# Patient Record
Sex: MALE | Race: BLACK OR AFRICAN AMERICAN | Employment: OTHER | URBAN - METROPOLITAN AREA
[De-identification: names, ages, dates, MRNs, and addresses within clinical notes are randomized per-mention and may not be internally consistent; named-entity substitution may affect disease eponyms.]

---

## 2017-01-19 ENCOUNTER — OFFICE VISIT (OUTPATIENT)
Dept: RHEUMATOLOGY | Age: 72
End: 2017-01-19

## 2017-01-19 VITALS
BODY MASS INDEX: 24.52 KG/M2 | DIASTOLIC BLOOD PRESSURE: 96 MMHG | SYSTOLIC BLOOD PRESSURE: 178 MMHG | OXYGEN SATURATION: 99 % | HEART RATE: 80 BPM | RESPIRATION RATE: 18 BRPM | HEIGHT: 72 IN | TEMPERATURE: 97.9 F | WEIGHT: 181 LBS

## 2017-01-19 DIAGNOSIS — Z79.52 LONG TERM (CURRENT) USE OF SYSTEMIC STEROIDS: ICD-10-CM

## 2017-01-19 DIAGNOSIS — Z79.899 LONG-TERM USE OF HIGH-RISK MEDICATION: ICD-10-CM

## 2017-01-19 DIAGNOSIS — M10.9 POLYARTICULAR GOUT: Primary | ICD-10-CM

## 2017-01-19 DIAGNOSIS — N18.3 CKD (CHRONIC KIDNEY DISEASE), STAGE 3 (MODERATE): ICD-10-CM

## 2017-01-19 DIAGNOSIS — I10 ESSENTIAL HYPERTENSION: ICD-10-CM

## 2017-01-19 RX ORDER — ALLOPURINOL 100 MG/1
TABLET ORAL
Qty: 45 TAB | Refills: 3 | Status: SHIPPED | OUTPATIENT
Start: 2017-01-19 | End: 2017-05-24 | Stop reason: SDUPTHER

## 2017-01-19 RX ORDER — PREDNISONE 5 MG/1
TABLET ORAL
Qty: 60 TAB | Refills: 3 | Status: SHIPPED | OUTPATIENT
Start: 2017-01-19 | End: 2017-08-24 | Stop reason: DRUGHIGH

## 2017-01-19 RX ORDER — COLCHICINE 0.6 MG/1
TABLET ORAL
Qty: 30 TAB | Refills: 3 | Status: SHIPPED | OUTPATIENT
Start: 2017-01-19 | End: 2017-05-24

## 2017-01-19 NOTE — PATIENT INSTRUCTIONS
Increase the Allopurinol to 150 mg total daily, so take 1 and 1/2 tablets daily  of your Allopurinol 100 mg tablets    Stay on the colchicine at one pill (0.6 mg) every other day    Stay on the prednisone at 10 mg daily. This will be a new prescription of prednisone 5 mg tablets, to take 2 of them together with breakfast each morning until next visit. If the wrist and finger are doing much better before your next appointment, then call for instructions on how to lower the prednisone.     Epsom salt soaks and massage the finger for the right pinky

## 2017-01-19 NOTE — PROGRESS NOTES
HISTORY OF PRESENT ILLNESS  Janie Wade is a 66 y.o. male. HPI  He presents for follow up of gout. He is on allopurinol 100 mg daily, and colchicine 0.6 mg every other day, and prednisone 10 mg daily (after a 20 mg taper from last visit and medrol 60 mg IM). He states he is feeling much better, but right 5th finger swelling hasn't gone down and both ankles still hurt. He states his right wrist feels much better, but has some mild discomfort with use. No flare since last visit. Overall he is feeling a whole lot better. He has switched to eating just fish, chicken, and turkey, and has cut out all red-meat since last visit. He also has CKD, sees renal. He reports his kidney function is stable. He takes acetaminophen 1000 mg about 2 doses total in a week for ankle pain. Of note, the pharmacy gave him prednisone 20 mg tablets, he brought in his bottle today, but the dosing is correct and he has been taking 1/2 tablet daily to equal 10 mg. Review of Systems   Constitutional: Negative for fever and weight loss. HENT: Negative for sore throat. Eyes: Negative for blurred vision. Respiratory: Negative for cough and shortness of breath. Cardiovascular: Negative for chest pain, palpitations and leg swelling. Gastrointestinal: Negative for abdominal pain, blood in stool, melena, nausea and vomiting. Musculoskeletal: Negative for falls. Skin: Negative for rash. Neurological: Negative for headaches. Endo/Heme/Allergies: Negative for polydipsia. Does not bruise/bleed easily. All other systems reviewed and are negative. No Known Allergies    Current Outpatient Prescriptions   Medication Sig Dispense Refill    lisinopril (PRINIVIL, ZESTRIL) 20 mg tablet Take  by mouth daily.  allopurinol (ZYLOPRIM) 100 mg tablet Take  by mouth daily.       ferrous sulfate 325 mg (65 mg iron) tablet TAKE 1 TABLET BY MOUTH EVERY DAY  2    folic acid (FOLVITE) 1 mg tablet 1 TABLET ONCE A DAY ORALLY 30 DAY(S) 2    glipiZIDE SR (GLUCOTROL XL) 5 mg CR tablet       predniSONE (DELTASONE) 10 mg tablet 20 mg twice daily for 3 days, then 20 mg once daily for 5 days, then 10 mg daily until follow up 45 Tab 1    traMADol (ULTRAM) 50 mg tablet Take 50 mg by mouth every six (6) hours as needed for Pain.  colchicine 0.6 mg tablet Take 0.6 mg by mouth daily as needed (gout flares).  atorvastatin (LIPITOR) 10 mg tablet TAKE 1 TABLET BY MOUTH ONCE A DAY  2    glyBURIDE (DIABETA) 2.5 mg tablet TAKE 1 TABLET BY MOUTH TWICE A DAY  2    metoprolol succinate (TOPROL-XL) 50 mg XL tablet Take 50 mg by mouth daily.  pantoprazole (PROTONIX) 40 mg tablet Take 40 mg by mouth daily.  oxyCODONE-acetaminophen (PERCOCET) 5-325 mg per tablet Take 1-2 Tabs by mouth every four (4) hours as needed for Pain. Max Daily Amount: 12 Tabs. 20 Tab 0    senna-docusate (SENNA WITH DOCUSATE SODIUM) 8.6-50 mg per tablet Take 1 Tab by mouth two (2) times a day.  tamsulosin (FLOMAX) 0.4 mg capsule Take 0.4 mg by mouth daily. Past medical, surgical, and family hx reviewed. height is 6' (1.829 m) and weight is 181 lb (82.1 kg). His temperature is 97.9 °F (36.6 °C). His blood pressure is 178/96 (abnormal) and his pulse is 80. His respiration is 18 and oxygen saturation is 99%. Physical Exam   Constitutional: He is oriented to person, place, and time. He appears well-developed and well-nourished. HENT:   Mouth/Throat: Oropharynx is clear and moist.   Eyes: Conjunctivae are normal. Pupils are equal, round, and reactive to light. Neck: Neck supple. Cardiovascular: Normal rate, regular rhythm and normal heart sounds. Pulmonary/Chest: Effort normal and breath sounds normal. He has no wheezes. Abdominal: Soft. Bowel sounds are normal. There is no tenderness.    Musculoskeletal: He exhibits no edema.   -synovitis: right wrist; right 5th PIP  -comfortable grossly intact ROM of shoulders and knees   -walking comfortably with cane   Lymphadenopathy:     He has no cervical adenopathy. Neurological: He is alert and oriented to person, place, and time. Skin: Skin is warm and dry. No rash noted. Psychiatric: He has a normal mood and affect. Thought content normal.   Vitals reviewed. No new labs since last visit    ASSESSMENT and PLAN    ICD-10-CM ICD-9-CM    1. Polyarticular gout - crystal proven. Limited attacks for perhaps 5-6 years with marked increase in symptoms since September 2016. He is taking colchicine 0.6 mg every other day (mild diarrhea with daily dosing) and allopurinol 100 mg daily. He is on prednisone 10 mg daily (after brief taper from 20 mg after last visit). Though the highest value on record was 7.3 in September. I suspect other values are likely suppressed due to acute gout symptoms. At prior visit he had marked and diffuse tenosynovitis which is greatly imrpoved today, still persists in right wrist and 5th finger. M10.00 274.00 SED RATE (ESR)    Increase allopurinol to 150 mg daily    Continue prednisone 10 mg daily (note change to 5 mg tablets, take 2 daily)    If doing well in 3 weeks, call for prednisone taper instructions    Continue colchicine 0.6 mg every other day (call if develops diarrhea)      URIC ACID      C REACTIVE PROTEIN, QT      CBC WITH AUTOMATED DIFF      METABOLIC PANEL, COMPREHENSIVE      predniSONE (DELTASONE) 5 mg tablet      allopurinol (ZYLOPRIM) 100 mg tablet      colchicine 0.6 mg tablet     2. Long-term use of high-risk medication Z79.899 V58.69 CBC WITH AUTOMATED DIFF      METABOLIC PANEL, COMPREHENSIVE     3. CKD (chronic kidney disease), stage 3 (moderate) - unclear etiology. He sees renal, but is unsure of his doctor's name   S94.4 544.8 METABOLIC PANEL, COMPREHENSIVE    -monitor with nephrologist  -colchicine as noted above  -titrate allopurinol cautiously   -no NSAIDS     4.  Long term (current) use of systemic steroids Z79.52 V58.65 He plans to schedule DXA ordered at last visit in the next couple months    Continue calcium and D    Do not abruptly stop prednisone     5. Essential hypertension - elevated today, he reports he has taken his medicines, he reports follow up appt with his PCP next week. I10 401.9 Recheck BP at pharmacy    Follow up with PCP next week as scheduled, sooner if higher BP readings on check at pharmacy or to ER if develops any warning signs discussed       Follow-up Disposition:  Return in about 4 weeks (around 2/16/2017). I have reviewed and discussed all findings with Dr. Deanne Conteh, who also examined the patient, and he agrees with the assessment and plan. ADDENDUM: I have seen and examined the patient. I have discussed the relevant findings with Ms. Carr and concur with the assessment and plan. Previous gout flare much improved. Perhaps mild residual synovitis in wrist and fifth finger. Allopurinol to 150 mg daily: reviewed increased risk for flare with dose change. Colchicine 0.6 mg every other day. Prednisone 10 mg daily for now as additional prophylactic given prior marked flares. Work on taper next visit if feasible. Calcium 1200 mg daily through healthy diet. Vitamin D3 3203-5942 units daily. DXA. Labs and close follow up as noted.   Cuong Franklin MD

## 2017-01-19 NOTE — MR AVS SNAPSHOT
Visit Information Date & Time Provider Department Dept. Phone Encounter #  
 1/19/2017  1:00 PM Delores Baker MD [facility] [id number] Follow-up Instructions Return in about 4 weeks (around 2/16/2017). Upcoming Health Maintenance Date Due  
 LIPID PANEL Q1 1945 FOOT EXAM Q1 5/27/1955 MICROALBUMIN Q1 5/27/1954 EYE EXAM RETINAL OR DILATED Q1 5/27/1954 FOBT Q 1 YEAR AGE 50-75 5/27/1994 ZOSTER VACCINE AGE 60> 5/27/2004 GLAUCOMA SCREENING Q2Y 5/27/2009 Pneumococcal 65+ High/Highest Risk (1 of 2 - PCV13) 5/27/2009 MEDICARE YEARLY EXAM 5/27/2009 DTaP/Tdap/Td series (1 - Tdap) 8/27/2011 HEMOGLOBIN A1C Q6M 3/17/2017 Allergies as of 1/19/2017  Review Complete On: 1/19/2017 By: Haresh Doty PA-C No Known Allergies Current Immunizations  Reviewed on 12/13/2016 Name Date  
 TD Vaccine 8/26/2011  8:04 PM  
  
 Not reviewed this visit You Were Diagnosed With   
  
 Codes Comments Polyarticular gout    -  Primary ICD-10-CM: M10.00 ICD-9-CM: 274.00 Long-term use of high-risk medication     ICD-10-CM: Z79.899 ICD-9-CM: V58.69 CKD (chronic kidney disease), stage 3 (moderate)     ICD-10-CM: N18.3 ICD-9-CM: 086. 3 Vitals BP Pulse Temp Resp Height(growth percentile) Weight(growth percentile) (!) 185/93 (BP 1 Location: Right arm, BP Patient Position: Sitting) 80 97.9 °F (36.6 °C) 18 6' (1.829 m) 181 lb (82.1 kg) SpO2 BMI Smoking Status 99% 24.55 kg/m2 Former Smoker BMI and BSA Data Body Mass Index Body Surface Area 24.55 kg/m 2 2.04 m 2 Preferred Pharmacy Pharmacy Name Phone [facility] [place], [address] [place] [address] AT [address] [place] [address] Drive [phone] Your Updated Medication List  
  
   
This list is accurate as of: 1/19/17  2:01 PM.  Always use your most recent med list.  
  
  
  
  
 allopurinol 100 mg tablet Commonly known as:  Dorota Minor Take 1 and 1/2 tabs po daily. THIS IS A NEW AND HIGHER DOSE.  
  
 atorvastatin 10 mg tablet Commonly known as:  LIPITOR  
TAKE 1 TABLET BY MOUTH ONCE A DAY  
  
 colchicine 0.6 mg tablet Take 1 tab po daily or as directed  
  
 ferrous sulfate 325 mg (65 mg iron) tablet TAKE 1 TABLET BY MOUTH EVERY DAY  
  
 folic acid 1 mg tablet Commonly known as:  FOLVITE  
1 TABLET ONCE A DAY ORALLY 30 DAY(S)  
  
 glipiZIDE SR 5 mg CR tablet Commonly known as:  GLUCOTROL XL  
  
 glyBURIDE 2.5 mg tablet Commonly known as:  Lorenzo dough TAKE 1 TABLET BY MOUTH TWICE A DAY  
  
 lisinopril 20 mg tablet Commonly known as:  Yuri Hu Take  by mouth daily. metoprolol succinate 50 mg XL tablet Commonly known as:  TOPROL-XL Take 50 mg by mouth daily. oxyCODONE-acetaminophen 5-325 mg per tablet Commonly known as:  PERCOCET Take 1-2 Tabs by mouth every four (4) hours as needed for Pain. Max Daily Amount: 12 Tabs. pantoprazole 40 mg tablet Commonly known as:  PROTONIX Take 40 mg by mouth daily. predniSONE 5 mg tablet Commonly known as:  Reino Marks Take 2 tabs po qam with breakfast  
  
 SENNA WITH DOCUSATE SODIUM 8.6-50 mg per tablet Generic drug:  senna-docusate Take 1 Tab by mouth two (2) times a day. tamsulosin 0.4 mg capsule Commonly known as:  FLOMAX Take 0.4 mg by mouth daily. traMADol 50 mg tablet Commonly known as:  ULTRAM  
Take 50 mg by mouth every six (6) hours as needed for Pain. Prescriptions Sent to Pharmacy Refills  
 predniSONE (DELTASONE) 5 mg tablet 3 Sig: Take 2 tabs po qam with breakfast  
 Class: Normal  
 Pharmacy: [facility] [address] AT [address] Ph #: [phone]  
 allopurinol (ZYLOPRIM) 100 mg tablet 3 Sig: Take 1 and 1/2 tabs po daily. THIS IS A NEW AND HIGHER DOSE. Class: Normal  
 Pharmacy: [email or web address] [address] AT [address] [place] [address] Drive Ph #: [phone]  
 colchicine 0.6 mg tablet 3 Sig: Take 1 tab po daily or as directed Class: Normal  
 Pharmacy: [facility] [address] AT [address] [place] [address] P Ph #: [phone] We Performed the Following C REACTIVE PROTEIN, QT [02367 CPT(R)] CBC WITH AUTOMATED DIFF [98770 CPT(R)] METABOLIC PANEL, COMPREHENSIVE [75210 CPT(R)] SED RATE (ESR) [id number] CPT(R)] URIC ACID [id number] CPT(R)] Follow-up Instructions Return in about 4 weeks (around 2/16/2017). Patient Instructions Increase the Allopurinol to 150 mg total daily, so take 1 and 1/2 tablets daily  of your Allopurinol 100 mg tablets Stay on the colchicine at one pill (0.6 mg) every other day Stay on the prednisone at 10 mg daily. This will be a new prescription of prednisone 5 mg tablets, to take 2 of them together with breakfast each morning until next visit. If the wrist and finger are doing much better before your next appointment, then call for instructions on how to lower the prednisone. Epsom salt soaks and massage the finger for the right pinky Introducing [facility] [place] [facility]! [facility] introduces e[employer] patient portal. Now you can access parts of your medical record, email your doctor's office, and request medication refills online. 1. In your internet browser, go to [email or web address] 2. Click on the First Time User? Click Here link in the Sign In box. You will see the New Member Sign Up page. 3. Enter your eoSemi Access Code exactly as it appears below. You will not need to use this code after youve completed the sign-up process. If you do not sign up before the expiration date, you must request a new code. · eo[employer]i Access Code: [email or web address] Expires: 3/13/2017  2:08 PM 
 
 4. Enter the last four digits of your Social Security Number (xxxx) and Date of Birth (mm/dd/yyyy) as indicated and click Submit. You will be taken to the next sign-up page. 5. Create a [employer] ID. This will be your [employer] login ID and cannot be changed, so think of one that is secure and easy to remember. 6. Create a [employer] password. You can change your password at any time. 7. Enter your Password Reset Question and Answer. This can be used at a later time if you forget your password. 8. Enter your e-mail address. You will receive e-mail notification when new information is available in [address] 9. Click Sign Up. You can now view and download portions of your medical record. 10. Click the Download Summary menu link to download a portable copy of your medical information. If you have questions, please visit the Frequently Asked Questions section of the [employer] website. Remember, [employer] is NOT to be used for urgent needs. For medical emergencies, dial 911. Now available from your iPhone and Android! Please provide this summary of care documentation to your next provider. Your primary care clinician is listed as Morris Medina. If you have any questions after today's visit, please call [phone].

## 2017-01-20 ENCOUNTER — TELEPHONE (OUTPATIENT)
Dept: RHEUMATOLOGY | Age: 72
End: 2017-01-20

## 2017-01-20 LAB
ALBUMIN SERPL-MCNC: 4.2 G/DL (ref 3.5–4.8)
ALBUMIN/GLOB SERPL: 1.6 {RATIO} (ref 1.1–2.5)
ALP SERPL-CCNC: 63 IU/L (ref 39–117)
ALT SERPL-CCNC: 17 IU/L (ref 0–44)
AST SERPL-CCNC: 18 IU/L (ref 0–40)
BASOPHILS # BLD AUTO: 0 X10E3/UL (ref 0–0.2)
BASOPHILS NFR BLD AUTO: 0 %
BILIRUB SERPL-MCNC: 0.3 MG/DL (ref 0–1.2)
BUN SERPL-MCNC: 15 MG/DL (ref 8–27)
BUN/CREAT SERPL: 16 (ref 10–22)
CALCIUM SERPL-MCNC: 8.9 MG/DL (ref 8.6–10.2)
CHLORIDE SERPL-SCNC: 105 MMOL/L (ref 96–106)
CO2 SERPL-SCNC: 22 MMOL/L (ref 18–29)
CREAT SERPL-MCNC: 0.92 MG/DL (ref 0.76–1.27)
CRP SERPL-MCNC: 2.6 MG/L (ref 0–4.9)
EOSINOPHIL # BLD AUTO: 0 X10E3/UL (ref 0–0.4)
EOSINOPHIL NFR BLD AUTO: 1 %
ERYTHROCYTE [DISTWIDTH] IN BLOOD BY AUTOMATED COUNT: 19 % (ref 12.3–15.4)
ERYTHROCYTE [SEDIMENTATION RATE] IN BLOOD BY WESTERGREN METHOD: 26 MM/HR (ref 0–30)
GLOBULIN SER CALC-MCNC: 2.7 G/DL (ref 1.5–4.5)
GLUCOSE SERPL-MCNC: 65 MG/DL (ref 65–99)
HCT VFR BLD AUTO: 39.9 % (ref 37.5–51)
HGB BLD-MCNC: 12.5 G/DL (ref 12.6–17.7)
IMM GRANULOCYTES # BLD: 0 X10E3/UL (ref 0–0.1)
IMM GRANULOCYTES NFR BLD: 0 %
LYMPHOCYTES # BLD AUTO: 1.4 X10E3/UL (ref 0.7–3.1)
LYMPHOCYTES NFR BLD AUTO: 19 %
MCH RBC QN AUTO: 29.1 PG (ref 26.6–33)
MCHC RBC AUTO-ENTMCNC: 31.3 G/DL (ref 31.5–35.7)
MCV RBC AUTO: 93 FL (ref 79–97)
MONOCYTES # BLD AUTO: 0.3 X10E3/UL (ref 0.1–0.9)
MONOCYTES NFR BLD AUTO: 4 %
NEUTROPHILS # BLD AUTO: 5.7 X10E3/UL (ref 1.4–7)
NEUTROPHILS NFR BLD AUTO: 76 %
PLATELET # BLD AUTO: 192 X10E3/UL (ref 150–379)
POTASSIUM SERPL-SCNC: 4.7 MMOL/L (ref 3.5–5.2)
PROT SERPL-MCNC: 6.9 G/DL (ref 6–8.5)
RBC # BLD AUTO: 4.29 X10E6/UL (ref 4.14–5.8)
SODIUM SERPL-SCNC: 147 MMOL/L (ref 134–144)
URATE SERPL-MCNC: 4.8 MG/DL (ref 3.7–8.6)
WBC # BLD AUTO: 7.4 X10E3/UL (ref 3.4–10.8)

## 2017-01-20 NOTE — PROGRESS NOTES
Called and spoke with Patient. Informed patient of results/instructions. Patient voiced understanding and agreed to follow Dr. Sarbjit Taylor instructions.

## 2017-01-20 NOTE — PROGRESS NOTES
Uric acid seems to be in a reasonable range (though still may be affected by recent flare). Slightly increased sodium: please drink plenty of water throughout the day.  Allopurinol, prednisone, and colchicine as directed

## 2017-01-25 ENCOUNTER — TELEPHONE (OUTPATIENT)
Dept: RHEUMATOLOGY | Age: 72
End: 2017-01-25

## 2017-02-24 ENCOUNTER — OFFICE VISIT (OUTPATIENT)
Dept: RHEUMATOLOGY | Age: 72
End: 2017-02-24

## 2017-02-24 VITALS
HEART RATE: 81 BPM | OXYGEN SATURATION: 97 % | SYSTOLIC BLOOD PRESSURE: 170 MMHG | TEMPERATURE: 98 F | HEIGHT: 72 IN | DIASTOLIC BLOOD PRESSURE: 86 MMHG | BODY MASS INDEX: 25.84 KG/M2 | RESPIRATION RATE: 22 BRPM | WEIGHT: 190.8 LBS

## 2017-02-24 DIAGNOSIS — Z79.899 LONG-TERM USE OF HIGH-RISK MEDICATION: ICD-10-CM

## 2017-02-24 DIAGNOSIS — Z79.52 LONG TERM (CURRENT) USE OF SYSTEMIC STEROIDS: ICD-10-CM

## 2017-02-24 DIAGNOSIS — I10 ESSENTIAL HYPERTENSION: ICD-10-CM

## 2017-02-24 DIAGNOSIS — N18.2 CKD (CHRONIC KIDNEY DISEASE), STAGE 2 (MILD): ICD-10-CM

## 2017-02-24 DIAGNOSIS — M1A.00X1 CHRONIC GOUTY ARTHROPATHY WITH TOPHI: Primary | ICD-10-CM

## 2017-02-24 RX ORDER — GLUCOSAMINE SULFATE 1500 MG
1000 POWDER IN PACKET (EA) ORAL DAILY
COMMUNITY

## 2017-02-24 NOTE — PROGRESS NOTES
HISTORY OF PRESENT ILLNESS  Lin Izaguirre is a 66 y.o. male. HPI Patient presents for follow up of gout. He is doing \"much better,\" though he noted a left elbow flare, beginning about 2 weeks ago. He had some swelling in the left elbow. Pain and swelling have improved,and the flare is milder than previous flares. He currently notes pain in the left ankle as well. He is taking allopurinol 150 mg once daily. He is taking colchicine 0.6 mg every other day and is tolerating this. He continues to take prednisone 10 mg daily. His blood sugars have been in the 110 range. He is taking 1000 units daily of vitamin D. He is getting perhaps 2 servings of calcium rich food daily. Current Outpatient Prescriptions   Medication Sig Dispense Refill    predniSONE (DELTASONE) 5 mg tablet Take 2 tabs po qam with breakfast 60 Tab 3    allopurinol (ZYLOPRIM) 100 mg tablet Take 1 and 1/2 tabs po daily. THIS IS A NEW AND HIGHER DOSE. 45 Tab 3    colchicine 0.6 mg tablet Take 1 tab po daily or as directed 30 Tab 3    lisinopril (PRINIVIL, ZESTRIL) 20 mg tablet Take  by mouth daily.  ferrous sulfate 325 mg (65 mg iron) tablet TAKE 1 TABLET BY MOUTH EVERY DAY  2    folic acid (FOLVITE) 1 mg tablet 1 TABLET ONCE A DAY ORALLY 30 DAY(S)  2    glipiZIDE SR (GLUCOTROL XL) 5 mg CR tablet Take 5 mg by mouth two (2) times a day.  pantoprazole (PROTONIX) 40 mg tablet Take 40 mg by mouth daily.  traMADol (ULTRAM) 50 mg tablet Take 50 mg by mouth every six (6) hours as needed for Pain.  oxyCODONE-acetaminophen (PERCOCET) 5-325 mg per tablet Take 1-2 Tabs by mouth every four (4) hours as needed for Pain. Max Daily Amount: 12 Tabs. 20 Tab 0    atorvastatin (LIPITOR) 10 mg tablet TAKE 1 TABLET BY MOUTH ONCE A DAY  2    metoprolol succinate (TOPROL-XL) 50 mg XL tablet Take 50 mg by mouth daily.  tamsulosin (FLOMAX) 0.4 mg capsule Take 0.4 mg by mouth daily.       senna-docusate (SENNA WITH DOCUSATE SODIUM) 8.6-50 mg per tablet Take 1 Tab by mouth two (2) times a day. Not taking      glyBURIDE (DIABETA) 2.5 mg tablet Not taking. 2     No Known Allergies    Review of Systems   Constitutional: Negative for fever. Cardiovascular: Positive for leg swelling. Gastrointestinal: Negative for abdominal pain and nausea. Skin: Negative for rash. Physical Exam   Vitals reviewed. Constitutional: He is oriented to person, place, and time. He appears well-developed and well-nourished. No distress. HENT:   Mouth/Throat: Oropharynx is clear and moist. No oropharyngeal exudate. No areas exposed jaw bone  Neck: Normal range of motion. Neck supple. Cardiovascular:   No edema   Pulmonary/Chest: Effort normal   Abdominal: Soft. He exhibits no distension. There is no tenderness. No organomegaly   Musculoskeletal:   -knees flexion to 90 degrees  -left elbow extension limited by 15 degrees  -synovitis left elbow, with tenderness left ankle  Lymphadenopathy:   He has no cervical adenopathy. Neurological: He is alert and oriented to person, place, and time. He exhibits normal muscle tone. Skin: Skin is warm and dry.   -few very small right olecranon nodules  Psychiatric: He has a normal mood and affect.  Thought content normal.   Lab Results   Component Value Date/Time    WBC 7.4 01/19/2017 02:09 PM    HGB 12.5 01/19/2017 02:09 PM    HCT 39.9 01/19/2017 02:09 PM    PLATELET 707 15/03/2899 02:09 PM    MCV 93 01/19/2017 02:09 PM     Lab Results   Component Value Date/Time    Sodium 147 01/19/2017 02:09 PM    Potassium 4.7 01/19/2017 02:09 PM    Chloride 105 01/19/2017 02:09 PM    CO2 22 01/19/2017 02:09 PM    Anion gap 9 11/18/2016 11:47 AM    Glucose 65 01/19/2017 02:09 PM    BUN 15 01/19/2017 02:09 PM    Creatinine 0.92 01/19/2017 02:09 PM    BUN/Creatinine ratio 16 01/19/2017 02:09 PM    GFR est AA 96 01/19/2017 02:09 PM    GFR est non-AA 83 01/19/2017 02:09 PM    Calcium 8.9 01/19/2017 02:09 PM    Bilirubin, total 0.3 01/19/2017 02:09 PM    AST (SGOT) 18 01/19/2017 02:09 PM    Alk. phosphatase 63 01/19/2017 02:09 PM    Protein, total 6.9 01/19/2017 02:09 PM    Albumin 4.2 01/19/2017 02:09 PM    Globulin 4.9 11/18/2016 11:47 AM    A-G Ratio 1.6 01/19/2017 02:09 PM    ALT (SGPT) 17 01/19/2017 02:09 PM     Lab Results   Component Value Date/Time    Uric acid 4.8 01/19/2017 02:09 PM     ASSESSMENT and PLAN    ICD-10-CM ICD-9-CM    1. 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 Limited attacks for perhaps 5-6 years with marked increase in symptoms since September 2016. He is taking colchicine 0.6 mg every other day, along with allopurinol 150 mg daily. Prednisone remains at 10 mg daily. Mild recent left elbow flare, no resolving. Overall improving from baseline visit. Last uric acid appears to be at goal (4.8). M1A.00X1 274.03 -allopurinol 150 mg daily (labs in 4 weeks)  -colchicine 0.6 mg once daily if tolerated  -prednisone slow taper over next 2-3 months (stay at lowest effective dose if significant flare)  -gout friendly diet reviewed   2. CKD (chronic kidney disease), stage 2 (mild): improved creatinine last check N18.2 585.2 CMP with next labs   3. Long-term use of high-risk medication Z79.899 V58.69 CBC and CMP 4 weeks   4. Long term (current) use of systemic steroids Z79.52 V58.65 -taper as noted  -calcium 1200 mg daily through healthy diet  -vitamin D3 1000 units daily  -schedule DXA     5.  Essential hypertension: on therapy I10 401.9 Monitor at home and with PCP if remains above goal

## 2017-02-24 NOTE — PATIENT INSTRUCTIONS
Schedule bone density    Colchicine 0.6 mg once daily.  If diarrhea , lower back to every other day ( and call)    Allopurinol 150 mg daily every day    Next week, begin lowering prednisone if tolerated:  1) For 1 week, 10 mg daily, alternating with 7.5 mg daily, then  2) For 1 week 7.5 mg daily (1 1/2 tabs), then  3) For 1 week, 7.5 mg daily, alternating with 5 mg daily, then  4) for 2 weeks, 5 mg daily, then  5) for 1 week, 5 mg daily, alternating with 2.5 mg daily, then  6) for 2 weeks, 2.5 mg (1/2 tab) daily, then  7) For 2 weeks, 2.5 mg every other day, then stop    Labs in 4 weeks if no recent flare

## 2017-03-07 ENCOUNTER — HOSPITAL ENCOUNTER (EMERGENCY)
Age: 72
Discharge: HOME | End: 2017-03-07
Attending: EMERGENCY MEDICINE | Admitting: EMERGENCY MEDICINE
Payer: MEDICARE

## 2017-03-07 ENCOUNTER — APPOINTMENT (OUTPATIENT)
Dept: GENERAL RADIOLOGY | Age: 72
End: 2017-03-07
Attending: EMERGENCY MEDICINE
Payer: MEDICARE

## 2017-03-07 ENCOUNTER — HOSPITAL ENCOUNTER (OUTPATIENT)
Dept: MAMMOGRAPHY | Age: 72
Discharge: HOME | End: 2017-03-07
Attending: INTERNAL MEDICINE
Payer: MEDICARE

## 2017-03-07 VITALS
WEIGHT: 191.5 LBS | SYSTOLIC BLOOD PRESSURE: 194 MMHG | TEMPERATURE: 97.8 F | OXYGEN SATURATION: 99 % | HEART RATE: 89 BPM | HEIGHT: 72 IN | RESPIRATION RATE: 16 BRPM | DIASTOLIC BLOOD PRESSURE: 85 MMHG | BODY MASS INDEX: 25.94 KG/M2

## 2017-03-07 DIAGNOSIS — S63.92XA SPRAIN OF LEFT HAND, INITIAL ENCOUNTER: ICD-10-CM

## 2017-03-07 DIAGNOSIS — Z79.52 LONG TERM (CURRENT) USE OF SYSTEMIC STEROIDS: ICD-10-CM

## 2017-03-07 DIAGNOSIS — S00.432A CONTUSION OF LEFT EAR, INITIAL ENCOUNTER: Primary | ICD-10-CM

## 2017-03-07 DIAGNOSIS — S63.502A SPRAIN OF LEFT WRIST, INITIAL ENCOUNTER: ICD-10-CM

## 2017-03-07 PROCEDURE — 73110 X-RAY EXAM OF WRIST: CPT

## 2017-03-07 PROCEDURE — 77080 DXA BONE DENSITY AXIAL: CPT

## 2017-03-07 PROCEDURE — 99282 EMERGENCY DEPT VISIT SF MDM: CPT

## 2017-03-07 PROCEDURE — 90714 TD VACC NO PRESV 7 YRS+ IM: CPT | Performed by: EMERGENCY MEDICINE

## 2017-03-07 PROCEDURE — 90471 IMMUNIZATION ADMIN: CPT

## 2017-03-07 PROCEDURE — 74011250636 HC RX REV CODE- 250/636: Performed by: EMERGENCY MEDICINE

## 2017-03-07 PROCEDURE — 73130 X-RAY EXAM OF HAND: CPT

## 2017-03-07 PROCEDURE — L3908 WHO COCK-UP NONMOLDE PRE OTS: HCPCS

## 2017-03-07 RX ORDER — CEPHALEXIN 500 MG/1
500 CAPSULE ORAL 4 TIMES DAILY
Qty: 28 CAP | Refills: 0 | Status: SHIPPED | OUTPATIENT
Start: 2017-03-07 | End: 2017-03-14

## 2017-03-07 RX ORDER — HYDROCODONE BITARTRATE AND ACETAMINOPHEN 5; 325 MG/1; MG/1
1 TABLET ORAL
Qty: 20 TAB | Refills: 0 | Status: SHIPPED | OUTPATIENT
Start: 2017-03-07 | End: 2017-04-15

## 2017-03-07 RX ADMIN — TETANUS AND DIPHTHERIA TOXOIDS ADSORBED 0.5 ML: 2; 2 INJECTION INTRAMUSCULAR at 12:47

## 2017-03-07 NOTE — LETTER
3/7/2017 1:31 PM 
 
Mr. Aniceto Barlow [address] [address] Dear Aniceto Barlow: We have been attempting to reach you but have been unsuccessful. Please find your most recent results below. Resulted Orders DEXA BONE DENSITY STUDY AXIAL Narrative Bone Mineral Density Indication:  prednisone therapy; lateral spine; forearm; central DXA please Age: 66 Sex: Male. Number of falls in the past year:   None. Risk factors for osteoporosis:  Pantoprazole, prednisone, rheumatoid arthritis Current medication for osteoporosis: None. Comparison:  None. Technique: Imaging was performed on the [employer] Excluded sites: L3 and L4 for spondylitic change Findings:  
  
Femoral Neck:  Right Bone mineral density (gm/cm2):? 1.103 
% of peak bone mass: 103 
% for age matched controls:? 105 T-score: 0.3 Z-score: 0.4 Total Hip: Right Bone mineral density (gm/cm2):  1.217 
% of peak bone mass:   111 
% for age matched controls:  80 T-score:   0.8 Z-score:  0.5 Femoral Neck:  Left Bone mineral density (gm/cm2):? 1.215 
% of peak bone mass: 114 
% for age matched controls:? 1:15 T-score: 1.1 Z-score: 1.2 Total Hip: Left Bone mineral density (gm/cm2):  1.262 
% of peak bone mass:   115 
% for age matched controls:  80 T-score:   1.1 Z-score:  0.8 Lumbar Spine:  L1-2 Bone mineral density (gm/cm2):  1.546 
% of peak bone mass:  128  
% for age matched controls:  80 T-score:  2.8 Z-score:  2.4 T score the distal one third left radius -0.5 Impression Impression: This patient is normal using the World Health Organization criteria Recommendations: 
Therapy recommendations need to be tailored to each individual patient. Please reconsider testing based on risk factors. Currently, Medicare will only 
reimburse for a central DXA examination every two years, unless the patient is on chronic glucocorticoid therapy. Note: Please note that reliable, valid comparisons cannot be made between 
studies which have been performed on machines from different manufacturers. If 
clinically warranted, a follow up study performed at this site, on the same 
unit, would allow the most sensitive assessment of change in bone mineral 
density. RECOMMENDATIONS: 
Your bone density test was normal. No new plan at present. Follow up as scheduled. Please call me if you have any questions: [phone] Sincerely, [facility] DEXA 1

## 2017-03-07 NOTE — ED PROVIDER NOTES
HPI Comments: 66 y.o. male with past medical history significant for arthritis, left wrist fracture, diabetes, dyslipidemia, and gout who presents with chief complaint of left hand pain. Patient reports mechanical ground level fall yesterday while pushing a trash can. Patient struck his left ear on the trash can and landed on his left wrist/hand. Patient complains of moderate pain to left wrist/hand and swelling to left hand and ear. Patient denies changes in hearing but states his left ear was too sore to put his hearing aids in. Patient has some mild neck pain this morning but denies any neck pain currently. Patient denies loss of consciousness, headache, back pain, shoulder pain, chest pain, hip/pelvis pain. There are no other acute medical concerns at this time. NKDA  Tetanus is not UTD. PCP: Chalo Cotton MD    Note written by Epifania Curling. Jim Crowder, as dictated by Isis Gillespie MD 12:08 PM      The history is provided by the patient. Past Medical History:   Diagnosis Date    Arthritis     Chronic back pain     Diabetes ([facility] [address]     Dyslipidemia     Gout     Hypertension     Left wrist fracture     Renal cancer [facility])        Past Surgical History:   Procedure Laterality Date    COLONOSCOPY,DIAGNOSTIC  3/24/2016         COLONOSCOPY,REMV LESN,SNARE  3/24/2016         HX ORTHOPAEDIC      Back and right knee surgery    HX ORTHOPAEDIC      carpal tunnel bilateral    RENAL SCOPE,W/RESECTION,TUMOR      UPPER GI ENDOSCOPY,BIOPSY  3/23/2016              Family History:   Problem Relation Age of Onset    Breast Cancer Mother     Diabetes Mother     Prostate Cancer Father     Cancer Sister      lung    Cancer Brother        Social History     Social History    Marital status:      Spouse name: N/A    Number of children: N/A    Years of education: N/A     Occupational History    Not on file.      Social History Main Topics    Smoking status: Former Smoker     Packs/day: 1.00 Years: 37.1     Quit date: 3/12/2005    Smokeless tobacco: Never Used    Alcohol use No      Comment: occasional    Drug use: No    Sexual activity: No     Other Topics Concern    Not on file     Social History Narrative         ALLERGIES: Review of patient's allergies indicates no known allergies. Review of Systems   Constitutional: Negative for activity change, appetite change and fatigue. HENT: Positive for ear pain (left ear pain). Negative for facial swelling, sore throat and trouble swallowing. Eyes: Negative for pain, discharge and visual disturbance. Respiratory: Negative for chest tightness, shortness of breath and wheezing. Cardiovascular: Negative for chest pain and palpitations. Gastrointestinal: Negative for abdominal pain, blood in stool, nausea and vomiting. Genitourinary: Negative for difficulty urinating, flank pain and hematuria. Musculoskeletal: Positive for arthralgias (left hand/wrist pain). Negative for joint swelling, myalgias and neck pain. Skin: Negative for color change and rash. Neurological: Negative for dizziness, weakness, numbness and headaches. Hematological: Negative for adenopathy. Does not bruise/bleed easily. Psychiatric/Behavioral: Negative for behavioral problems, confusion and sleep disturbance. All other systems reviewed and are negative. Vitals:    03/07/17 1010   BP: 194/85   Pulse: 89   Resp: 16   Temp: 97.8 °F (36.6 °C)   SpO2: 99%   Weight: 86.9 kg (191 lb 8 oz)   Height: 6' (1.829 m)            Physical Exam   Constitutional: He is oriented to person, place, and time. He appears well-developed and well-nourished. He appears distressed (With some pain in the left external ear. ). HENT:   Head: Normocephalic. Nose: Nose normal.   Mouth/Throat: Oropharynx is clear and moist.   Left ear: generalized swelling of left external ear with small punctate tear/abrasion inside external ear.   No distinct drainable pocket of fluid in external ear. See attached picture. Eyes: Conjunctivae and EOM are normal. Pupils are equal, round, and reactive to light. No scleral icterus. Neck: Normal range of motion. Neck supple. No JVD present. No tracheal deviation present. No thyromegaly present. No carotid bruits noted. Cardiovascular: Normal rate, regular rhythm, normal heart sounds and intact distal pulses. Exam reveals no gallop and no friction rub. No murmur heard. Pulmonary/Chest: Effort normal and breath sounds normal. No respiratory distress. He has no wheezes. He has no rales. He exhibits no tenderness. Abdominal: Soft. Bowel sounds are normal. He exhibits no distension and no mass. There is no tenderness. There is no rebound and no guarding. Musculoskeletal: Normal range of motion. He exhibits no edema. Left wrist: He exhibits swelling. He exhibits no deformity. Left hand: He exhibits tenderness and swelling. He exhibits no deformity. Lymphadenopathy:     He has no cervical adenopathy. Neurological: He is alert and oriented to person, place, and time. He has normal reflexes. No cranial nerve deficit. Coordination normal.   Skin: Skin is warm and dry. No rash noted. No erythema. Psychiatric: He has a normal mood and affect. His behavior is normal. Judgment and thought content normal.   Nursing note and vitals reviewed. Note written by Christie Lemus.  Manjeet Li, as dictated by Sg Machado MD 11:58 AM          MDM  Number of Diagnoses or Management Options  Contusion of left ear, initial encounter: new and requires workup  Sprain of left hand, initial encounter: new and requires workup  Sprain of left wrist, initial encounter: new and requires workup     Amount and/or Complexity of Data Reviewed  Tests in the radiology section of CPT®: ordered and reviewed  Decide to obtain previous medical records or to obtain history from someone other than the patient: yes  Review and summarize past medical records: yes  Discuss the patient with other providers: yes  Independent visualization of images, tracings, or specimens: yes    Risk of Complications, Morbidity, and/or Mortality  Presenting problems: moderate  Diagnostic procedures: moderate  Management options: moderate    Patient Progress  Patient progress: stable    ED Course       Procedures      12:13 PM  ENT paged. 12:40 PM  No return call from ENT. Spoke with another ER MD who agrees that there is not much to do for the ear other than cool compresses. Plan to discharge patient with instructions to apply cool compresses to left ear and left wrist/hand. Instructions to use medications as directed and keep splint in place for the next 7 days, then follow up with PCP and ortho as needed. Patient has also been instructed to follow up with ENT to ensure ear is healing well. Rx for Norco and Keflex given to patient. CONSULT NOTE:  1:16 PM Isis Gillespie MD spoke with Dr. Maura Melendez, Consult for ENT. Discussed available diagnostic tests and clinical findings. He is in agreement with care plans as outlined. Dr. Maura Melendez will see patient in follow up; agrees with antibiotics.

## 2017-03-07 NOTE — ED TRIAGE NOTES
Pt stated he tripped and fell yesterday, c/o left ear pain and left hand pain, +swelling noted to both, denies loc

## 2017-03-07 NOTE — PROGRESS NOTES
Attempted to reach patient via telephone. Patient has unworking number. Unable to leave message. Result letter sent.
Bon density normal. No new plan at present. F/U as scheduled.
2017 23:33

## 2017-03-08 NOTE — CALL BACK NOTE
[facility] [place] [facility] Emergency Department Follow Up Call Record    Discharged to : Home/Family Home/Home Health/Skilled Facility/Rehab/Assisted Living/Other__home_____  1) Did you receive your discharge instructions? Yes        2) Do you understand them? Yes     Mr. Janie Wade reports he feels better today, sprained wrist still sore. He is wearing splint on affected wrist and hand. He is taking the hydrocodone for pain , but did not obtain prescription for Keflex. 3) Are you able to follow them? Yes          If NO, what can I clarify for you? Need to call [facility] about the Keflex prescription. 4) Do you understand your diagnosis? Yes         5) Do you know which symptoms should prompt you to call the doctor? Yes     6) Were you able to fill and  any medications that were prescribed? Yes     7) You were prescribed __hydrocodone, keflex_________for __pain wrist , and contusion left ear__________________. Common side effects of this medication are_rash, ddrowsiness___________________. This is not a complete list so please review the forms given from the pharmacy for a complete list.      8) Are there any questions about your medications? Yes  Mr. Vincent loja received the discharge instructions but did not get Keflex prescription . [facility] to [facility] this issue and  that prescription           Have you scheduled any recommended doctors appointments (specialty, PCP)  If NO, what barriers are you encountering (transportation/lost contact info/cost/  didnt think necessary/no PCP  9) If discharged with Home Health, has the agency contacted you to schedule visit? Not applicable  10) Is there anyone available to help you at home (meals, errands, transportation    monitoring) (adult children, neighbors, private duty companions) Yes    11) Are you on a special diet? No         If YES, do you understand the requirements for this diet? Education provided?   12) If presented with cough, bronchitis, COPD, asthma, is it ok to ask that the   respiratory disease management educator call you? Not applicable      13)  A) If presented with fall, were you issued an assistive device in the ED    Are you using? Not applicable  B) If given RX for device, have you obtained? Not applicable       If NO, barriers? C) Therapist recommended:NO   Are you able to implement the suggestions? Not applicable        If NO, barriers to implementation? D) Are you having any difficulties with mobility inside your home?     (steps, bed, tub)No   If YES, ask if the [facility] PT can contact patient and good time and number?  14)  At the end of your discharge instructions, there is information about accessing \[facility]\"" & HEALTH SERVICES, have you had a chance to review those? No         Do you have any questions about signing up for this service? We encourage our patients to be active participants in their healthcare and this site is one of the ways to do that. It will allow you to access parts of your medical record, email your doctors office, schedule appointments, and request medications refills . 15) Are there any other questions that I can answer for you regarding    your Emergency department visit?  NO             Estimated Call Time:_____11:41 AM  ______________ Date/Time:_______________

## 2017-03-20 ENCOUNTER — TELEPHONE (OUTPATIENT)
Dept: RHEUMATOLOGY | Age: 72
End: 2017-03-20

## 2017-03-20 NOTE — TELEPHONE ENCOUNTER
Mr. Dhaval Gonzalez called complaining of an arthritis flare in his left ankle. His inflammation has increased as of Sat., 3/18/17 since he has tapered down to 5 mg of Prednisone(as of today). He cannot walk on it and it is swollen; at \"10 mg I was fine\" He denies any other symptoms. Informed Dr. Rommel Hewitt would be alerted and we will be contact him for further instructions.

## 2017-03-21 ENCOUNTER — OFFICE VISIT (OUTPATIENT)
Dept: RHEUMATOLOGY | Age: 72
End: 2017-03-21

## 2017-03-21 VITALS
BODY MASS INDEX: 25.87 KG/M2 | TEMPERATURE: 99 F | WEIGHT: 191 LBS | HEART RATE: 85 BPM | OXYGEN SATURATION: 99 % | RESPIRATION RATE: 16 BRPM | DIASTOLIC BLOOD PRESSURE: 88 MMHG | HEIGHT: 72 IN | SYSTOLIC BLOOD PRESSURE: 173 MMHG

## 2017-03-21 DIAGNOSIS — Z79.52 LONG TERM (CURRENT) USE OF SYSTEMIC STEROIDS: ICD-10-CM

## 2017-03-21 DIAGNOSIS — M1A.00X1 CHRONIC GOUTY ARTHROPATHY WITH TOPHI: Primary | ICD-10-CM

## 2017-03-21 DIAGNOSIS — M79.672 BILATERAL FOOT PAIN: ICD-10-CM

## 2017-03-21 DIAGNOSIS — M79.671 BILATERAL FOOT PAIN: ICD-10-CM

## 2017-03-21 NOTE — TELEPHONE ENCOUNTER
Called and informed Mr. Delfina Gonzales of the dosage increase per Dr. Rose Mary Blake. He stated that he understood.

## 2017-03-21 NOTE — PATIENT INSTRUCTIONS
Monitor blood sugars (likely to increase next 1-2 weeks)    Watch blood pressure at home    Prednisone 10 mg daily for 1 weeks. If improved, lower to 7.5 mg (1 1/2 tabs) once daily.  Call in 4 weeks with status update    Continue Allopurinol and colchicine as prescribed    Labs 2 weeks after gout is totally better from this flare

## 2017-03-21 NOTE — PROGRESS NOTES
HISTORY OF PRESENT ILLNESS  Onelia Tellez is a 66 y.o. male. HPI  Patient presents for an acute visit. He had a flare of pain in the right ankle and foot 3-4 days ago (he tapered from 7.5 mg daily prednisone to 5 mg daily last Monday). The left foot began swelling a couple of days ago. Each region is swollen. He has no fever. He is taking prednisone 10 mg daily again (increased today after we had called). He is taking allopurinol 150 mg daily and colchicine 0.6 mg daily. Current Outpatient Prescriptions   Medication Sig Dispense Refill    HYDROcodone-acetaminophen (NORCO) 5-325 mg per tablet Take 1 Tab by mouth every four (4) hours as needed for Pain. Max Daily Amount: 6 Tabs. 20 Tab 0    cholecalciferol (VITAMIN D3) 1,000 unit cap Take  by mouth daily.  predniSONE (DELTASONE) 5 mg tablet Take 2 tabs po qam with breakfast 60 Tab 3    allopurinol (ZYLOPRIM) 100 mg tablet Take 1 and 1/2 tabs po daily. THIS IS A NEW AND HIGHER DOSE. 45 Tab 3    colchicine 0.6 mg tablet Take 1 tab po daily or as directed 30 Tab 3    lisinopril (PRINIVIL, ZESTRIL) 20 mg tablet Take  by mouth daily.  folic acid (FOLVITE) 1 mg tablet 1 TABLET ONCE A DAY ORALLY 30 DAY(S)  2    glipiZIDE SR (GLUCOTROL XL) 5 mg CR tablet Take 5 mg by mouth two (2) times a day.  pantoprazole (PROTONIX) 40 mg tablet Take 40 mg by mouth daily.  senna-docusate (SENNA WITH DOCUSATE SODIUM) 8.6-50 mg per tablet Take 1 Tab by mouth two (2) times a day. Not taking      atorvastatin (LIPITOR) 10 mg tablet TAKE 1 TABLET BY MOUTH ONCE A DAY  2    metoprolol succinate (TOPROL-XL) 50 mg XL tablet Take 50 mg by mouth daily.  tamsulosin (FLOMAX) 0.4 mg capsule Take 0.4 mg by mouth daily.  ferrous sulfate 325 mg (65 mg iron) tablet TAKE 1 TABLET BY MOUTH EVERY DAY  2    traMADol (ULTRAM) 50 mg tablet Take 50 mg by mouth every six (6) hours as needed for Pain.       oxyCODONE-acetaminophen (PERCOCET) 5-325 mg per tablet Take 1-2 Tabs by mouth every four (4) hours as needed for Pain. Max Daily Amount: 12 Tabs. 20 Tab 0    glyBURIDE (DIABETA) 2.5 mg tablet Not taking. 2     No Known Allergies    Review of Systems   Constitutional: Negative for fever. Cardiovascular: Positive for leg swelling. Gastrointestinal: Negative for abdominal pain. Musculoskeletal: Positive for falls. Physical Exam   Vitals reviewed. Constitutional: He is oriented to person, place, and time. He appears well-developed and well-nourished. No distress. Cardiovascular:   No edema    Musculoskeletal:   -knees flexion to 90 degrees  -tenosynovitis right ankle to the mtps. Synovitis left foot, tarsal region, just distal to the ankle. Pain passive ROM right ankle. Tenderness left wrist  Lymphadenopathy:   He has no cervical adenopathy. Neurological: He is alert and oriented to person, place, and time. He exhibits normal muscle tone. Skin: Skin is warm and dry.   -no rash lower extremities  Psychiatric: He has a normal mood and affect. Thought content normal  Indication: diffuse joint pain. Using aseptic technique, the following medication was administered into the left deltoid: methylprednisolone 60 mg. Procedure was tolerated well. ASSESSMENT and PLAN    ICD-10-CM ICD-9-CM    1. Chronic gouty arthropathy with tophi: He is taking colchicine 0.6 mg every other day and allopurinol 150 mg daily. Taper of prednisone from 7.5 mg daily to 5 mg daily associated with a flare (see below). M1A.00X1 274.03 THER/PROPH/DIAG INJECTION, SUBCUT/IM  Continue current allopurinol and colchicine  Prednisone as noted below (continue to try to taper when improved)  Labs 2 weeks after improved from this flare      METHYLPREDNISOLONE ACETATE INJECTION 20 MG      METHYLPREDNISOLONE ACETATE INJECTION 40 MG   2.  Long term (current) use of systemic steroids: recent DXA normal Z79.52 V58.65 -calcium 1200 mg daily total  -vitamin D3 4520-4092 units daily  -taper as noted  -monitor blood sugars with IM medrol   3. Bilateral foot pain: R>L. Acute gout flare with taper of prednisone from 7.5 mg daily to 5 mg daily M79.671 729.5 THER/PROPH/DIAG INJECTION, SUBCUT/IM    M79.672  METHYLPREDNISOLONE ACETATE INJECTION 20 MG      METHYLPREDNISOLONE ACETATE INJECTION 40 MG  Prednisone 10 mg daily for one week. f improved, lower to 7.5 mg (1 1/2 tabs) once daily.  Call in 4 weeks with status update

## 2017-03-21 NOTE — MR AVS SNAPSHOT
Visit Information Date & Time Provider Department Dept. Phone Encounter #  
 3/21/2017  4:30 PM Krystin Gabriel MD [facility] [id number] Your Appointments 5/24/2017 10:30 AM  
ESTABLISHED PATIENT with Krystin Gabriel MD  
[facility]) Appt Note: fu 3m o  
 [address] [facility] [id number]  
[phone]  
  
   
 [address] [id number] Upcoming Health Maintenance Date Due  
 LIPID PANEL Q1 1945 FOOT EXAM Q1 5/27/1954 MICROALBUMIN Q1 5/27/1954 EYE EXAM RETINAL OR DILATED Q1 5/27/1954 FOBT Q 1 YEAR AGE 50-75 5/27/1994 ZOSTER VACCINE AGE 60> 5/27/2004 GLAUCOMA SCREENING Q2Y 5/27/2009 Pneumococcal 65+ High/Highest Risk (1 of 2 - PCV13) 5/27/2009 MEDICARE YEARLY EXAM 5/27/2009 DTaP/Tdap/Td series (1 - Tdap) 3/8/2017 HEMOGLOBIN A1C Q6M 3/17/2017 Allergies as of 3/21/2017  Review Complete On: 3/21/2017 By: Krystin Gabriel MD  
 No Known Allergies Current Immunizations  Reviewed on 3/21/2017 Name Date  
 TD Vaccine 8/26/2011  8:04 PM  
 Td, Adsorbed 3/7/2017 12:47 PM  
  
 Reviewed by Snow Gandhi LPN on 4/81/7089 at  4:54 PM  
You Were Diagnosed With   
  
 Codes Comments Chronic gouty arthropathy with tophi    -  Primary ICD-10-CM: M1A.00X1 ICD-9-CM: 274.03 Long term (current) use of systemic steroids     ICD-10-CM: Z79.52 
ICD-9-CM: V58.65 Bilateral foot pain     ICD-10-CM: M79.671, E41.276 ICD-9-CM: 729.5 Vitals BP Pulse Temp Resp Height(growth percentile) Weight(growth percentile) 173/88 (BP 1 Location: Right arm, BP Patient Position: Sitting) 85 99 °F (37.2 °C) (Oral) 16 6' (1.829 m) 191 lb (86.6 kg) SpO2 BMI Smoking Status 99% 25.9 kg/m2 Former Smoker BMI and BSA Data Body Mass Index Body Surface Area  
 25.9 kg/m 2 2.1 m 2 Preferred Pharmacy Pharmacy Name Phone [facility], [address] [phone] Your Updated Medication List  
  
   
This list is accurate as of: 3/21/17  5:21 PM.  Always use your most recent med list.  
  
  
  
  
 allopurinol 100 mg tablet Commonly known as:  Trina Huh Take 1 and 1/2 tabs po daily. THIS IS A NEW AND HIGHER DOSE.  
  
 atorvastatin 10 mg tablet Commonly known as:  LIPITOR  
TAKE 1 TABLET BY MOUTH ONCE A DAY cholecalciferol 1,000 unit Cap Commonly known as:  VITAMIN D3 Take  by mouth daily. colchicine 0.6 mg tablet Take 1 tab po daily or as directed  
  
 ferrous sulfate 325 mg (65 mg iron) tablet TAKE 1 TABLET BY MOUTH EVERY DAY  
  
 folic acid 1 mg tablet Commonly known as:  FOLVITE  
1 TABLET ONCE A DAY ORALLY 30 DAY(S)  
  
 glipiZIDE SR 5 mg CR tablet Commonly known as:  GLUCOTROL XL Take 5 mg by mouth two (2) times a day. glyBURIDE 2.5 mg tablet Commonly known as:  [place] Ano Not taking. HYDROcodone-acetaminophen 5-325 mg per tablet Commonly known as:  William Woo Take 1 Tab by mouth every four (4) hours as needed for Pain. Max Daily Amount: 6 Tabs. lisinopril 20 mg tablet Commonly known as:  Calista Shames Take  by mouth daily. metoprolol succinate 50 mg XL tablet Commonly known as:  TOPROL-XL Take 50 mg by mouth daily. oxyCODONE-acetaminophen 5-325 mg per tablet Commonly known as:  PERCOCET Take 1-2 Tabs by mouth every four (4) hours as needed for Pain. Max Daily Amount: 12 Tabs. pantoprazole 40 mg tablet Commonly known as:  PROTONIX Take 40 mg by mouth daily. predniSONE 5 mg tablet Commonly known as:  Donna Chill Take 2 tabs po qam with breakfast  
  
 SENNA WITH DOCUSATE SODIUM 8.6-50 mg per tablet Generic drug:  senna-docusate Take 1 Tab by mouth two (2) times a day. Not taking  
  
 tamsulosin 0.4 mg capsule Commonly known as:  FLOMAX Take 0.4 mg by mouth daily. traMADol 50 mg tablet Commonly known as:  ULTRAM  
Take 50 mg by mouth every six (6) hours as needed for Pain. We Performed the Following METHYLPREDNISOLONE ACETATE INJECTION 20 MG [ HCP] Comments:  
 Submit quantity per 20 mg injection METHYLPREDNISOLONE ACETATE INJECTION 40 MG [ HCP] Comments:  
 Submit quantity per 40 mg injection THER/PROPH/DIAG INJECTION, SUBCUT/IM [id number] CPT(R)] Patient Instructions Monitor blood sugars (likely to increase next 1-2 weeks) Watch blood pressure at home Prednisone 10 mg daily for 1 weeks. If improved, lower to 7.5 mg (1 1/2 tabs) once daily. Call in 4 weeks with status update Continue Allopurinol and colchicine as prescribed Labs 2 weeks after gout is totally better from this flare Introducing [facility]! Jason Peña introduces [facility] patient portal. Now you can access parts of your medical record, email your doctor's office, and request medication refills online. 1. In your internet browser, go to [email or web address] "[email or web address] 2. Click on the First Time User? Click Here link in the Sign In box. You will see the New Member Sign Up page. 3. Enter your [facility] Access Code exactly as it appears below. You will not need to use this code after youve completed the sign-up process. If you do not sign up before the expiration date, you must request a new code. · [facility] Access Code: [email or web address] Expires: 6/19/2017  5:21 PM 
 
4. Enter the last four digits of your Social Security Number (xxxx) and Date of Birth (mm/dd/yyyy) as indicated and click Submit. You will be taken to the next sign-up page. 5. Create a [employer] ID. This will be your [facility] login ID and cannot be changed, so think of one that is secure and easy to remember. 6. Create a [employer] password. You can change your password at any time. 7. Enter your Password Reset Question and Answer. This can be used at a later time if you forget your password. 8. Enter your e-mail address. You will receive e-mail notification when new information is available in [address] 9. Click Sign Up. You can now view and download portions of your medical record. 10. Click the Download Summary menu link to download a portable copy of your medical information. If you have questions, please visit the Frequently Asked Questions section of the [employer] website. Remember, [employer] is NOT to be used for urgent needs. For medical emergencies, dial 911. Now available from your iPhone and Android! Please provide this summary of care documentation to your next provider. Your primary care clinician is listed as Abdiel Duenas. If you have any questions after today's visit, please call [phone].

## 2017-03-21 NOTE — TELEPHONE ENCOUNTER
Return to 10 mg daily (2 tabs of 5 mg prednisone) for 2 weeks. If improved, then lower to 7.5 mg (1 1/2 tabs) once daily and call with update after one week at this dose.

## 2017-04-15 ENCOUNTER — APPOINTMENT (OUTPATIENT)
Dept: GENERAL RADIOLOGY | Age: 72
DRG: 287 | End: 2017-04-15
Attending: EMERGENCY MEDICINE
Payer: MEDICARE

## 2017-04-15 ENCOUNTER — HOSPITAL ENCOUNTER (INPATIENT)
Age: 72
LOS: 3 days | Discharge: HOME | DRG: 287 | End: 2017-04-18
Attending: EMERGENCY MEDICINE | Admitting: INTERNAL MEDICINE
Payer: MEDICARE

## 2017-04-15 DIAGNOSIS — R07.89 CHEST TIGHTNESS: ICD-10-CM

## 2017-04-15 DIAGNOSIS — R77.8 ELEVATED TROPONIN: Primary | ICD-10-CM

## 2017-04-15 DIAGNOSIS — R60.9 EDEMA, UNSPECIFIED TYPE: ICD-10-CM

## 2017-04-15 LAB
ALBUMIN SERPL BCP-MCNC: 3.2 G/DL (ref 3.5–5)
ALBUMIN/GLOB SERPL: 0.9 {RATIO} (ref 1.1–2.2)
ALP SERPL-CCNC: 64 U/L (ref 45–117)
ALT SERPL-CCNC: 31 U/L (ref 12–78)
ANION GAP BLD CALC-SCNC: 9 MMOL/L (ref 5–15)
APPEARANCE UR: CLEAR
APTT PPP: 27.7 SEC (ref 22.1–32.5)
AST SERPL W P-5'-P-CCNC: 23 U/L (ref 15–37)
BACTERIA URNS QL MICRO: NEGATIVE /HPF
BASOPHILS # BLD AUTO: 0 K/UL (ref 0–0.1)
BASOPHILS # BLD: 0 % (ref 0–1)
BILIRUB SERPL-MCNC: 0.5 MG/DL (ref 0.2–1)
BILIRUB UR QL: NEGATIVE
BUN SERPL-MCNC: 17 MG/DL (ref 6–20)
BUN/CREAT SERPL: 16 (ref 12–20)
CALCIUM SERPL-MCNC: 7.7 MG/DL (ref 8.5–10.1)
CHLORIDE SERPL-SCNC: 111 MMOL/L (ref 97–108)
CK MB CFR SERPL CALC: 1.3 % (ref 0–2.5)
CK MB SERPL-MCNC: 5.8 NG/ML (ref 5–25)
CK SERPL-CCNC: 441 U/L (ref 39–308)
CO2 SERPL-SCNC: 24 MMOL/L (ref 21–32)
COLOR UR: ABNORMAL
CREAT SERPL-MCNC: 1.08 MG/DL (ref 0.7–1.3)
DIFFERENTIAL METHOD BLD: ABNORMAL
EOSINOPHIL # BLD: 0 K/UL (ref 0–0.4)
EOSINOPHIL NFR BLD: 0 % (ref 0–7)
EPITH CASTS URNS QL MICRO: ABNORMAL /LPF
ERYTHROCYTE [DISTWIDTH] IN BLOOD BY AUTOMATED COUNT: 15.3 % (ref 11.5–14.5)
GLOBULIN SER CALC-MCNC: 3.5 G/DL (ref 2–4)
GLUCOSE BLD STRIP.AUTO-MCNC: 119 MG/DL (ref 65–100)
GLUCOSE SERPL-MCNC: 151 MG/DL (ref 65–100)
GLUCOSE UR STRIP.AUTO-MCNC: NEGATIVE MG/DL
HCT VFR BLD AUTO: 39.9 % (ref 36.6–50.3)
HGB BLD-MCNC: 12.7 G/DL (ref 12.1–17)
HGB UR QL STRIP: NEGATIVE
HYALINE CASTS URNS QL MICRO: ABNORMAL /LPF (ref 0–5)
INR PPP: 1.1 (ref 0.9–1.1)
KETONES UR QL STRIP.AUTO: NEGATIVE MG/DL
LEUKOCYTE ESTERASE UR QL STRIP.AUTO: NEGATIVE
LYMPHOCYTES # BLD AUTO: 13 % (ref 12–49)
LYMPHOCYTES # BLD: 0.7 K/UL (ref 0.8–3.5)
MCH RBC QN AUTO: 30.6 PG (ref 26–34)
MCHC RBC AUTO-ENTMCNC: 31.8 G/DL (ref 30–36.5)
MCV RBC AUTO: 96.1 FL (ref 80–99)
MONOCYTES # BLD: 0.2 K/UL (ref 0–1)
MONOCYTES NFR BLD AUTO: 4 % (ref 5–13)
NEUTS SEG # BLD: 4.5 K/UL (ref 1.8–8)
NEUTS SEG NFR BLD AUTO: 83 % (ref 32–75)
NITRITE UR QL STRIP.AUTO: NEGATIVE
PH UR STRIP: 6 [PH] (ref 5–8)
PLATELET # BLD AUTO: 115 K/UL (ref 150–400)
POTASSIUM SERPL-SCNC: 4.1 MMOL/L (ref 3.5–5.1)
PROT SERPL-MCNC: 6.7 G/DL (ref 6.4–8.2)
PROT UR STRIP-MCNC: 100 MG/DL
PROTHROMBIN TIME: 10.8 SEC (ref 9–11.1)
RBC # BLD AUTO: 4.15 M/UL (ref 4.1–5.7)
RBC #/AREA URNS HPF: ABNORMAL /HPF (ref 0–5)
RBC MORPH BLD: ABNORMAL
SERVICE CMNT-IMP: ABNORMAL
SODIUM SERPL-SCNC: 144 MMOL/L (ref 136–145)
SP GR UR REFRACTOMETRY: 1.01 (ref 1–1.03)
THERAPEUTIC RANGE,PTTT: NORMAL SECS (ref 58–77)
TROPONIN I SERPL-MCNC: 0.28 NG/ML
UA: UC IF INDICATED,UAUC: ABNORMAL
UROBILINOGEN UR QL STRIP.AUTO: 0.2 EU/DL (ref 0.2–1)
WBC # BLD AUTO: 5.4 K/UL (ref 4.1–11.1)
WBC URNS QL MICRO: ABNORMAL /HPF (ref 0–4)

## 2017-04-15 PROCEDURE — 93005 ELECTROCARDIOGRAM TRACING: CPT

## 2017-04-15 PROCEDURE — 74011250636 HC RX REV CODE- 250/636: Performed by: INTERNAL MEDICINE

## 2017-04-15 PROCEDURE — 82550 ASSAY OF CK (CPK): CPT | Performed by: EMERGENCY MEDICINE

## 2017-04-15 PROCEDURE — 96374 THER/PROPH/DIAG INJ IV PUSH: CPT

## 2017-04-15 PROCEDURE — 99284 EMERGENCY DEPT VISIT MOD MDM: CPT

## 2017-04-15 PROCEDURE — 71010 XR CHEST PORT: CPT

## 2017-04-15 PROCEDURE — 85025 COMPLETE CBC W/AUTO DIFF WBC: CPT | Performed by: EMERGENCY MEDICINE

## 2017-04-15 PROCEDURE — 82553 CREATINE MB FRACTION: CPT | Performed by: EMERGENCY MEDICINE

## 2017-04-15 PROCEDURE — 94762 N-INVAS EAR/PLS OXIMTRY CONT: CPT

## 2017-04-15 PROCEDURE — 74011250637 HC RX REV CODE- 250/637: Performed by: INTERNAL MEDICINE

## 2017-04-15 PROCEDURE — 82962 GLUCOSE BLOOD TEST: CPT

## 2017-04-15 PROCEDURE — 81001 URINALYSIS AUTO W/SCOPE: CPT | Performed by: EMERGENCY MEDICINE

## 2017-04-15 PROCEDURE — 65660000000 HC RM CCU STEPDOWN

## 2017-04-15 PROCEDURE — 85610 PROTHROMBIN TIME: CPT | Performed by: EMERGENCY MEDICINE

## 2017-04-15 PROCEDURE — 36415 COLL VENOUS BLD VENIPUNCTURE: CPT | Performed by: EMERGENCY MEDICINE

## 2017-04-15 PROCEDURE — 80053 COMPREHEN METABOLIC PANEL: CPT | Performed by: EMERGENCY MEDICINE

## 2017-04-15 PROCEDURE — 74011250637 HC RX REV CODE- 250/637: Performed by: EMERGENCY MEDICINE

## 2017-04-15 PROCEDURE — 84484 ASSAY OF TROPONIN QUANT: CPT | Performed by: EMERGENCY MEDICINE

## 2017-04-15 PROCEDURE — 85730 THROMBOPLASTIN TIME PARTIAL: CPT | Performed by: EMERGENCY MEDICINE

## 2017-04-15 RX ORDER — PREDNISONE 5 MG/1
7.5 TABLET ORAL
Status: DISCONTINUED | OUTPATIENT
Start: 2017-04-16 | End: 2017-04-18 | Stop reason: HOSPADM

## 2017-04-15 RX ORDER — MAGNESIUM SULFATE 100 %
4 CRYSTALS MISCELLANEOUS AS NEEDED
Status: DISCONTINUED | OUTPATIENT
Start: 2017-04-15 | End: 2017-04-18 | Stop reason: HOSPADM

## 2017-04-15 RX ORDER — ONDANSETRON 2 MG/ML
4 INJECTION INTRAMUSCULAR; INTRAVENOUS
Status: DISCONTINUED | OUTPATIENT
Start: 2017-04-15 | End: 2017-04-18 | Stop reason: HOSPADM

## 2017-04-15 RX ORDER — ENOXAPARIN SODIUM 100 MG/ML
40 INJECTION SUBCUTANEOUS EVERY 24 HOURS
Status: DISCONTINUED | OUTPATIENT
Start: 2017-04-15 | End: 2017-04-18 | Stop reason: HOSPADM

## 2017-04-15 RX ORDER — CARVEDILOL 12.5 MG/1
12.5 TABLET ORAL 2 TIMES DAILY WITH MEALS
Status: DISCONTINUED | OUTPATIENT
Start: 2017-04-15 | End: 2017-04-17

## 2017-04-15 RX ORDER — ALLOPURINOL 100 MG/1
150 TABLET ORAL DAILY
Status: DISCONTINUED | OUTPATIENT
Start: 2017-04-16 | End: 2017-04-18 | Stop reason: HOSPADM

## 2017-04-15 RX ORDER — LISINOPRIL 20 MG/1
20 TABLET ORAL DAILY
Status: DISCONTINUED | OUTPATIENT
Start: 2017-04-16 | End: 2017-04-18 | Stop reason: HOSPADM

## 2017-04-15 RX ORDER — INSULIN LISPRO 100 [IU]/ML
INJECTION, SOLUTION INTRAVENOUS; SUBCUTANEOUS
Status: DISCONTINUED | OUTPATIENT
Start: 2017-04-15 | End: 2017-04-18 | Stop reason: HOSPADM

## 2017-04-15 RX ORDER — COLCHICINE 0.6 MG/1
0.6 TABLET ORAL DAILY
Status: DISCONTINUED | OUTPATIENT
Start: 2017-04-16 | End: 2017-04-18 | Stop reason: HOSPADM

## 2017-04-15 RX ORDER — ACETAMINOPHEN 325 MG/1
650 TABLET ORAL
Status: DISCONTINUED | OUTPATIENT
Start: 2017-04-15 | End: 2017-04-18 | Stop reason: HOSPADM

## 2017-04-15 RX ORDER — DOCUSATE SODIUM 100 MG/1
100 CAPSULE, LIQUID FILLED ORAL
Status: DISCONTINUED | OUTPATIENT
Start: 2017-04-15 | End: 2017-04-18 | Stop reason: HOSPADM

## 2017-04-15 RX ORDER — GUAIFENESIN 100 MG/5ML
324 LIQUID (ML) ORAL
Status: COMPLETED | OUTPATIENT
Start: 2017-04-15 | End: 2017-04-15

## 2017-04-15 RX ORDER — FUROSEMIDE 10 MG/ML
20 INJECTION INTRAMUSCULAR; INTRAVENOUS DAILY
Status: DISCONTINUED | OUTPATIENT
Start: 2017-04-15 | End: 2017-04-16

## 2017-04-15 RX ORDER — DEXTROSE 50 % IN WATER (D50W) INTRAVENOUS SYRINGE
12.5-25 AS NEEDED
Status: DISCONTINUED | OUTPATIENT
Start: 2017-04-15 | End: 2017-04-18 | Stop reason: HOSPADM

## 2017-04-15 RX ORDER — HYDROCODONE BITARTRATE AND ACETAMINOPHEN 5; 325 MG/1; MG/1
1 TABLET ORAL
Status: DISCONTINUED | OUTPATIENT
Start: 2017-04-15 | End: 2017-04-18 | Stop reason: HOSPADM

## 2017-04-15 RX ORDER — LABETALOL HYDROCHLORIDE 5 MG/ML
20 INJECTION, SOLUTION INTRAVENOUS
Status: DISCONTINUED | OUTPATIENT
Start: 2017-04-15 | End: 2017-04-18 | Stop reason: HOSPADM

## 2017-04-15 RX ORDER — ATORVASTATIN CALCIUM 10 MG/1
10 TABLET, FILM COATED ORAL
Status: DISCONTINUED | OUTPATIENT
Start: 2017-04-15 | End: 2017-04-18 | Stop reason: HOSPADM

## 2017-04-15 RX ORDER — SODIUM CHLORIDE 0.9 % (FLUSH) 0.9 %
5-10 SYRINGE (ML) INJECTION EVERY 8 HOURS
Status: DISCONTINUED | OUTPATIENT
Start: 2017-04-15 | End: 2017-04-18 | Stop reason: HOSPADM

## 2017-04-15 RX ORDER — SODIUM CHLORIDE 0.9 % (FLUSH) 0.9 %
5-10 SYRINGE (ML) INJECTION AS NEEDED
Status: DISCONTINUED | OUTPATIENT
Start: 2017-04-15 | End: 2017-04-18 | Stop reason: HOSPADM

## 2017-04-15 RX ORDER — PANTOPRAZOLE SODIUM 40 MG/1
40 TABLET, DELAYED RELEASE ORAL DAILY
Status: DISCONTINUED | OUTPATIENT
Start: 2017-04-16 | End: 2017-04-18 | Stop reason: HOSPADM

## 2017-04-15 RX ORDER — GUAIFENESIN 100 MG/5ML
81 LIQUID (ML) ORAL DAILY
Status: DISCONTINUED | OUTPATIENT
Start: 2017-04-16 | End: 2017-04-18 | Stop reason: HOSPADM

## 2017-04-15 RX ADMIN — NITROGLYCERIN 1 INCH: 20 OINTMENT TOPICAL at 18:44

## 2017-04-15 RX ADMIN — NITROGLYCERIN 1 INCH: 20 OINTMENT TOPICAL at 12:58

## 2017-04-15 RX ADMIN — ASPIRIN 81 MG CHEWABLE TABLET 324 MG: 81 TABLET CHEWABLE at 12:58

## 2017-04-15 RX ADMIN — Medication 10 ML: at 20:05

## 2017-04-15 RX ADMIN — NITROGLYCERIN 1 INCH: 20 OINTMENT TOPICAL at 23:38

## 2017-04-15 RX ADMIN — CARVEDILOL 12.5 MG: 12.5 TABLET, FILM COATED ORAL at 18:44

## 2017-04-15 RX ADMIN — FUROSEMIDE 20 MG: 10 INJECTION, SOLUTION INTRAMUSCULAR; INTRAVENOUS at 18:43

## 2017-04-15 RX ADMIN — ENOXAPARIN SODIUM 40 MG: 40 INJECTION SUBCUTANEOUS at 20:09

## 2017-04-15 RX ADMIN — ATORVASTATIN CALCIUM 10 MG: 10 TABLET, FILM COATED ORAL at 20:09

## 2017-04-15 NOTE — ED NOTES
Pt presents with c/o shortness of breath x 1 week and chest tightness x this AM.  Pt ambulated into room from triage; A & O x 4; pulse ox 94% on RA. Oriented to room and call bell; cardiac and continuous spO2 monitoring initiated; IV started, blood samples collected and sent to the lab for analysis; EKG completed; plan of care reviewed with pt. Call bell within reach, side rails up x 2, will continue to monitor.

## 2017-04-15 NOTE — ED NOTES
Safety check of environment done with call bell in reach; Toilet rounds done check to see if patient needs bedpan, BSC or to get up to bathroom. Ongoing  Plan of Care reviewed including any test results reviewed; Pain re-evaluated. Hourly Rounding complete.

## 2017-04-15 NOTE — H&P
Hospitalist Admission Note    NAME: Feliciano Gallego   :  1945   MRN:  [id number]     Date/Time:  4/15/2017 7:44 PM    Patient PCP: Car Beck MD  ________________________________________________________________________    My assessment of this patient's clinical condition and my plan of care is as follows. Assessment / Plan:  Chest pressure with elevated troponin  Pulmonary edema  -pressure ongoing for last few weeks, but acutely worsened today especially on exertion, found to have indeterminate troponin 0.28 without significant EKG changes  -cardiology consulted in ED  -start aspirin, lasix  -change toprol to coreg  -continue lisinopril, statin  -check echo  -trend troponin, consider ischemic evaluation    Diabetes  -will hold glipizde while inpatient, start SSI    Gout, arthritis  -continue home medications          Code Status: full  Surrogate Decision Maker: wife    DVT Prophylaxis: lovenox  GI Prophylaxis: not indicated    Baseline: independent         Subjective:   CHIEF COMPLAINT: chest pressure    HISTORY OF PRESENT ILLNESS:     Feliciano Gallego is a 66 y.o.  male with history of gout, diabetes, HTN who presents with worsening chest tightness. Symptoms started a few weeks ago but significantly worsened today. Exacerbated by exertion and associated with SOB. Patient's symptoms have resolved in ED after aspirin, nitro, and oxygen. Denies any cardiac history of having a stress test in the past.     We were asked to admit for work up and evaluation of the above problems.      Past Medical History:   Diagnosis Date    Arthritis     Chronic back pain     Diabetes ([facility] [address]     Dyslipidemia     Gout     Hypertension     Left wrist fracture     Renal cancer [facility])         Past Surgical History:   Procedure Laterality Date    COLONOSCOPY,DIAGNOSTIC  3/24/2016         COLONOSCOPY,REMV JAREDN,SNARE  3/24/2016         HX ORTHOPAEDIC      Back and right knee surgery  HX ORTHOPAEDIC      carpal tunnel bilateral    RENAL SCOPE,W/RESECTION,TUMOR      UPPER GI ENDOSCOPY,BIOPSY  3/23/2016            Social History   Substance Use Topics    Smoking status: Former Smoker     Packs/day: 1.00     Years: 45.00     Quit date: 3/12/2005    Smokeless tobacco: Never Used    Alcohol use No      Comment: occasional        Family History   Problem Relation Age of Onset    Breast Cancer Mother     Diabetes Mother     Prostate Cancer Father     Cancer Sister      lung    Cancer Brother      No Known Allergies     Prior to Admission medications    Medication Sig Start Date End Date Taking? Authorizing Provider   cholecalciferol (VITAMIN D3) 1,000 unit cap Take  by mouth daily. Yes Historical Provider   predniSONE (DELTASONE) 5 mg tablet Take 2 tabs po qam with breakfast 1/19/17  Yes Rosa Carr PA-C   allopurinol (ZYLOPRIM) 100 mg tablet Take 1 and 1/2 tabs po daily. THIS IS A NEW AND HIGHER DOSE. 1/19/17  Yes Rosa Carr PA-C   colchicine 0.6 mg tablet Take 1 tab po daily or as directed 1/19/17  Yes Rosa Carr PA-C   lisinopril (PRINIVIL, ZESTRIL) 20 mg tablet Take  by mouth daily. Yes Historical Provider   glipiZIDE SR (GLUCOTROL XL) 5 mg CR tablet Take 5 mg by mouth two (2) times a day. 12/26/16  Yes Historical Provider   pantoprazole (PROTONIX) 40 mg tablet Take 40 mg by mouth daily. Yes Historical Provider   atorvastatin (LIPITOR) 10 mg tablet TAKE 1 TABLET BY MOUTH ONCE A DAY 8/31/16  Yes Historical Provider   metoprolol succinate (TOPROL-XL) 50 mg XL tablet Take 50 mg by mouth daily. Yes Historical Provider   tamsulosin (FLOMAX) 0.4 mg capsule Take 0.4 mg by mouth daily. Yes Maverick Thrasher MD   traMADol (ULTRAM) 50 mg tablet Take 50 mg by mouth every six (6) hours as needed for Pain.     Historical Provider       REVIEW OF SYSTEMS:       Total of 12 systems reviewed as follows:         General: no fever, no chills, no sweats, no generalized weakness, no weight loss, no weight gain, no loss of appetite   Eyes: no blurred vision, no eye pain, no loss of vision, no double vision  ENT: no rhinorrhea, no pharyngitis   Respiratory: no cough, no sputum production, + SOB, + OG, no wheezing, no pleuritic pain   Cardiology: + chest pain, no palpitations, no orthopnea, no PND, no edema, no syncope   Gastrointestinal: no abdominal pain, no N/V, no diarrhea, no dysphagia, no constipation, no bleeding   Genitourinary: no frequency, no urgency, no dysuria, no hematuria, no incontinence   Muskuloskeletal : + arthralgia, no myalgia, no back pain  Hematology: no easy bruising, no nose or gum bleeding, no lymphadenopathy   Dermatological: no rash, no ulceration, no pruritis, no color change / jaundice  Endocrine: no hot flashes, no polydipsia   Neurological: no headache, no dizziness, no confusion, no focal weakness, no paresthesia, no speech difficulties, no memory loss, no gait difficulty  Psychological: no anxiety, no depression, no agitation      Objective:   VITALS:    Patient Vitals for the past 12 hrs:   Temp Pulse Resp BP SpO2   04/15/17 1851 - 81 22 153/90 97 %   04/15/17 1843 - 83 - 153/90 -   04/15/17 1632 - 78 24 - 98 %   04/15/17 1630 - 78 26 (!) 166/98 98 %   04/15/17 1545 - 78 18 - 97 %   04/15/17 1530 - 78 25 (!) 172/99 94 %   04/15/17 1500 - 82 19 (!) 178/105 93 %   04/15/17 1430 - 79 20 (!) 168/97 94 %   04/15/17 1258 - 82 - (!) 170/96 -   04/15/17 1218 97.7 °F (36.5 °C) 89 18 (!) 175/113 94 %         PHYSICAL EXAM:    General:    Alert, cooperative, mildly diaphoretic, appears stated age. HEENT: Atraumatic, anicteric sclerae, pink conjunctivae     No oral ulcers, oral mucosa moist, throat clear, dentition fair  Neck:  Supple, symmetrical  Lungs:   Clear to auscultation bilaterally, no wheezing, rhonchi, or rales, tachypneic  Chest wall:  No tenderness, no accessory muscle use.   Heart:   Regular rhythm, no murmur, no edema  Abdomen:   Soft, non-tender, not distended, bowel sounds normal  Extremities: No cyanosis, no clubbing, warm, well perfused, radial pulse 2+  Skin:     Not pale, not jaundiced, no rashes   Psych:  Good insight, not depressed, not anxious or agitated. Neurologic: EOMs intact, face symmetric, no aphasia or slurred speech, strength 5/5 in BUE, 5/5 in BLE, sensation grossly intact, alert and oriented x 4.     _______________________________________________________________________  Care Plan discussed with:    Comments   Patient x    Family      RN     Care Manager                    Consultant:      _______________________________________________________________________  Expected  Disposition:   Home with Family x   HH/PT/OT/RN    SNF/LTC    JANEL    ________________________________________________________________________  TOTAL TIME:  72 Minutes    Critical Care Provided     Minutes non procedure based      Comments     Reviewed previous records   >50% of visit spent in counseling and coordination of care  Discussion with patient and/or family and questions answered       ________________________________________________________________________  Haider Raya MD    Procedures: see electronic medical records for all procedures/Xrays and details which were not copied into this note but were reviewed prior to creation of Plan.     LAB DATA REVIEWED:    Recent Results (from the past 24 hour(s))   EKG, 12 LEAD, INITIAL    Collection Time: 04/15/17 12:29 PM   Result Value Ref Range    Ventricular Rate 80 BPM    Atrial Rate 80 BPM    P-R Interval 178 ms    QRS Duration 82 ms    Q-T Interval 406 ms    QTC Calculation (Bezet) 468 ms    Calculated P Axis 71 degrees    Calculated R Axis 6 degrees    Calculated T Axis 67 degrees    Diagnosis       Normal sinus rhythm  Moderate voltage criteria for LVH, may be normal variant  Nonspecific T wave abnormality  Prolonged QT  Abnormal ECG  When compared with ECG of 28-SEP-2016 21:08,  Sinus rhythm has replaced Ectopic atrial rhythm  Nonspecific T wave abnormality, worse in Lateral leads  QT has lengthened     CBC WITH AUTOMATED DIFF    Collection Time: 04/15/17  1:10 PM   Result Value Ref Range    WBC 5.4 4.1 - 11.1 K/uL    RBC 4.15 4.10 - 5.70 M/uL    HGB 12.7 12.1 - 17.0 g/dL    HCT 39.9 36.6 - 50.3 %    MCV 96.1 80.0 - 99.0 FL    MCH 30.6 26.0 - 34.0 PG    MCHC 31.8 30.0 - 36.5 g/dL    RDW 15.3 (H) 11.5 - 14.5 %    PLATELET 530 (L) 672 - 400 K/uL    NEUTROPHILS 83 (H) 32 - 75 %    LYMPHOCYTES 13 12 - 49 %    MONOCYTES 4 (L) 5 - 13 %    EOSINOPHILS 0 0 - 7 %    BASOPHILS 0 0 - 1 %    ABS. NEUTROPHILS 4.5 1.8 - 8.0 K/UL    ABS. LYMPHOCYTES 0.7 (L) 0.8 - 3.5 K/UL    ABS. MONOCYTES 0.2 0.0 - 1.0 K/UL    ABS. EOSINOPHILS 0.0 0.0 - 0.4 K/UL    ABS. BASOPHILS 0.0 0.0 - 0.1 K/UL    RBC COMMENTS NORMOCYTIC, NORMOCHROMIC      DF SMEAR SCANNED     METABOLIC PANEL, COMPREHENSIVE    Collection Time: 04/15/17  1:10 PM   Result Value Ref Range    Sodium 144 136 - 145 mmol/L    Potassium 4.1 3.5 - 5.1 mmol/L    Chloride 111 (H) 97 - 108 mmol/L    CO2 24 21 - 32 mmol/L    Anion gap 9 5 - 15 mmol/L    Glucose 151 (H) 65 - 100 mg/dL    BUN 17 6 - 20 MG/DL    Creatinine 1.08 0.70 - 1.30 MG/DL    BUN/Creatinine ratio 16 12 - 20      GFR est AA >60 >60 ml/min/1.73m2    GFR est non-AA >60 >60 ml/min/1.73m2    Calcium 7.7 (L) 8.5 - 10.1 MG/DL    Bilirubin, total 0.5 0.2 - 1.0 MG/DL    ALT (SGPT) 31 12 - 78 U/L    AST (SGOT) 23 15 - 37 U/L    Alk.  phosphatase 64 45 - 117 U/L    Protein, total 6.7 6.4 - 8.2 g/dL    Albumin 3.2 (L) 3.5 - 5.0 g/dL    Globulin 3.5 2.0 - 4.0 g/dL    A-G Ratio 0.9 (L) 1.1 - 2.2     CK W/ REFLX CKMB    Collection Time: 04/15/17  1:10 PM   Result Value Ref Range     (H) 39 - 308 U/L   TROPONIN I    Collection Time: 04/15/17  1:10 PM   Result Value Ref Range    Troponin-I, Qt. 0.28 (H) <0.05 ng/mL   PROTHROMBIN TIME + INR    Collection Time: 04/15/17  1:10 PM   Result Value Ref Range    INR 1.1 0.9 - 1.1      Prothrombin time 10.8 9.0 - 11.1 sec   PTT    Collection Time: 04/15/17  1:10 PM   Result Value Ref Range    aPTT 27.7 22.1 - 32.5 sec    aPTT, therapeutic range     58.0 - 77.0 SECS   CK-MB,QUANT.     Collection Time: 04/15/17  1:10 PM   Result Value Ref Range    CK - MB 5.8 (H) <3.6 NG/ML    CK-MB Index 1.3 0 - 2.5     URINALYSIS W/ REFLEX CULTURE    Collection Time: 04/15/17  2:06 PM   Result Value Ref Range    Color YELLOW/STRAW      Appearance CLEAR CLEAR      Specific gravity 1.012 1.003 - 1.030      pH (UA) 6.0 5.0 - 8.0      Protein 100 (A) NEG mg/dL    Glucose NEGATIVE  NEG mg/dL    Ketone NEGATIVE  NEG mg/dL    Bilirubin NEGATIVE  NEG      Blood NEGATIVE  NEG      Urobilinogen 0.2 0.2 - 1.0 EU/dL    Nitrites NEGATIVE  NEG      Leukocyte Esterase NEGATIVE  NEG      WBC 0-4 0 - 4 /hpf    RBC 0-5 0 - 5 /hpf    Epithelial cells FEW FEW /lpf    Bacteria NEGATIVE  NEG /hpf    UA:UC IF INDICATED CULTURE NOT INDICATED BY UA RESULT CNI      Hyaline cast 0-2 0 - 5 /lpf

## 2017-04-15 NOTE — IP AVS SNAPSHOT
[address] [facility] 
[phone] Patient: Jose Del Valle MRN: [id number] :1945 You are allergic to the following No active allergies Recent Documentation Height Weight BMI Smoking Status 1.829 m 84.1 kg 25.16 kg/m2 Former Smoker Emergency Contacts Name Discharge Info Relation Home Work Mobile Nelli Apple DISCHARGE CAREGIVER [3] Spouse [3] [phone] Judith Son DISCHARGE CAREGIVER [3] Daughter [21]   [phone] About your hospitalization You were admitted on:  April 15, 2017 You last received care in the:  [facility] You were discharged on:  2017 Unit phone number:  [phone] Why you were hospitalized Your primary diagnosis was:  Not on File Your diagnoses also included:  Chest Pressure Providers Seen During Your Hospitalizations Provider Role Specialty Primary office phone Shahzad Crane MD Attending Provider Emergency Medicine [phone] Summer Baltazar MD Attending Provider Internal Medicine [phone] Jena Fuller MD Attending Provider Cardiology [phone] Your Primary Care Physician (PCP) Primary Care Physician Office Phone Office Fax Samm Black [phone] Follow-up Information Follow up With Details Comments Contact Info Kinza Lemus MD   [address] [place] [address] [phone] Your Appointments Wednesday May 24, 2017 10:30 AM EDT  
ESTABLISHED PATIENT with Josee Haynes MD  
Arthritis and [address] [facility]) [address] [phone] Current Discharge Medication List  
  
START taking these medications Dose & Instructions Dispensing Information Comments Morning Noon Evening Bedtime  
 aspirin 81 mg chewable tablet Your last dose was: Your next dose is:    
   
   
 Dose:  81 mg Take 1 Tab by mouth daily. Quantity:  30 Tab Refills:  11  
     
   
   
   
  
 carvedilol 25 mg tablet Commonly known as:  Feli Mullins Your last dose was: Your next dose is:    
   
   
 Dose:  25 mg Take 1 Tab by mouth two (2) times daily (with meals). Quantity:  30 Tab Refills:  11  
     
   
   
   
  
 furosemide 20 mg tablet Commonly known as:  LASIX Your last dose was: Your next dose is:    
   
   
 Dose:  20 mg Take 1 Tab by mouth daily. Quantity:  30 Tab Refills:  11  
     
   
   
   
  
 potassium chloride SR 10 mEq tablet Commonly known as:  KLOR-CON 10 Your last dose was: Your next dose is:    
   
   
 Dose:  10 mEq Take 1 Tab by mouth daily. Quantity:  30 Tab Refills:  11 CONTINUE these medications which have NOT CHANGED Dose & Instructions Dispensing Information Comments Morning Noon Evening Bedtime  
 allopurinol 100 mg tablet Commonly known as:  Hailey Doty Your last dose was: Your next dose is: Take 1 and 1/2 tabs po daily. THIS IS A NEW AND HIGHER DOSE. Quantity:  45 Tab Refills:  3  
     
   
   
   
  
 atorvastatin 10 mg tablet Commonly known as:  LIPITOR Your last dose was: Your next dose is: TAKE 1 TABLET BY MOUTH ONCE A DAY Refills:  2 cholecalciferol 1,000 unit Cap Commonly known as:  VITAMIN D3 Your last dose was: Your next dose is: Take  by mouth daily. Refills:  0  
     
   
   
   
  
 colchicine 0.6 mg tablet Your last dose was: Your next dose is: Take 1 tab po daily or as directed Quantity:  30 Tab Refills:  3  
     
   
   
   
  
 glipiZIDE SR 5 mg CR tablet Commonly known as:  GLUCOTROL XL Your last dose was: Your next dose is:    
   
   
 Dose:  5 mg Take 5 mg by mouth two (2) times a day. Refills:  0  
     
   
   
   
  
 lisinopril 20 mg tablet Commonly known as:  Laz Bold Your last dose was: Your next dose is: Take  by mouth daily. Refills:  0  
     
   
   
   
  
 pantoprazole 40 mg tablet Commonly known as:  PROTONIX Your last dose was: Your next dose is:    
   
   
 Dose:  40 mg Take 40 mg by mouth daily. Refills:  0  
     
   
   
   
  
 predniSONE 5 mg tablet Commonly known as:  Christella Salts Your last dose was: Your next dose is: Take 2 tabs po qam with breakfast  
 Quantity:  60 Tab Refills:  3  
     
   
   
   
  
 tamsulosin 0.4 mg capsule Commonly known as:  FLOMAX Your last dose was: Your next dose is:    
   
   
 Dose:  0.4 mg Take 0.4 mg by mouth daily. Refills:  0  
     
   
   
   
  
 traMADol 50 mg tablet Commonly known as:  ULTRAM  
   
Your last dose was: Your next dose is:    
   
   
 Dose:  50 mg Take 50 mg by mouth every six (6) hours as needed for Pain. Refills:  0 STOP taking these medications   
 metoprolol succinate 50 mg XL tablet Commonly known as:  TOPROL-XL Where to Get Your Medications Information on where to get these meds will be given to you by the nurse or doctor. ! Ask your nurse or doctor about these medications  
  aspirin 81 mg chewable tablet  
 carvedilol 25 mg tablet  
 furosemide 20 mg tablet  
 potassium chloride SR 10 mEq tablet Discharge Instructions [address] [facility], [address] [phone] [address]    [email or web address] Patient Discharge Instructions Howie Becker / [id number] : 1945 Admitted 4/15/2017 Discharged: 2017 · It is important that you take the medication exactly as they are prescribed. · Keep your medication in the bottles provided by the pharmacist and keep a list of the medication names, dosages, and times to be taken in your wallet. · Do not take other medications without consulting your doctor. BRING ALL OF YOUR MEDICINES TO YOUR OFFICE VISIT. Follow-up with Parker Sheriff MD in 2 weeks. Cardiac Catheterization  Discharge Instructions ? Do not drive, operate any machinery, or sign any legal documents for 24 hours after your procedure. You must have someone to drive you home. ? You may take a shower 24 hours after your cardiac catheterization. Be sure to get the dressing wet and then remove it; gently wash the area with warm soapy water. Pat dry and leave open to air. To help prevent infections, be sure to keep the cath site clean and dry. No lotions, creams, powders, ointments, etc. in the cath site for approximately 1 week. ? Do not take a tub bath, get in a hot tub or swimming pool for approximately 5 days or until the cath site is completely healed. ? No strenuous activity or heavy lifting over 10 lbs. for 7 days. ? Drink plenty of fluids for 24-48 hours after your cath to flush the contrast dye from your kidneys. No alcoholic beverages for 24 hours. You may resume your previous diet (low fat, low cholesterol) after your cath. ? After your cath, some bruising or discomfort is common during the healing process. Tylenol, 1-2 tablets every 6 hours as needed, is recommended if you experience any discomfort.   If you experience any signs or symptoms of infection such as fever, chills, or poorly healing incision, persistent tenderness or swelling in the groin, redness and/or warmth to the touch, numbness, significant tingling or pain at the groin site or affected extremity, rash, drainage from the cath site, or if the leg feels tight or swollen, call your physician right away. ? If bleeding at the cath site occurs, take a clean gauze pad and apply direct pressure to the groin just above the puncture site. Call 911 immediately, and continue to apply direct pressure until an ambulance gets to your location. ? You may return to work  2  days after your cardiac cath if no groin bleeding. Information obtained by : 
I understand that if any problems occur once I am at home I am to contact my physician. I understand and acknowledge receipt of the instructions indicated above. R.N.'s Signature                                                                  Date/Time Patient or Representative Signature                                                          Date/Time Isadora Smith MD 
          
[address] [facility], [address]    [phone] [address]    [email or web address] Discharge Orders None GoingOn Announcement We are excited to announce that we are making your provider's discharge notes available to you in GoingOn. You will see these notes when they are completed and signed by the physician that discharged you from your recent hospital stay. If you have any questions or concerns about any information you see in GoingOn, please call the Health Information Department where you were seen or reach out to your Primary Care Provider for more information about your plan of care. Introducing [facility]! [address] introduces GoingOn patient portal. Now you can access parts of your medical record, email your doctor's office, and request medication refills online. 1. In your internet browser, go to [email or web address] 2. Click on the First Time User? Click Here link in the Sign In box. You will see the New Member Sign Up page. 3. Enter your [employer] Access Code exactly as it appears below. You will not need to use this code after youve completed the sign-up process. If you do not sign up before the expiration date, you must request a new code. · [employer] Access Code: [email or web address] Expires: 6/19/2017  5:21 PM 
 
4. Enter the last four digits of your Social Security Number (xxxx) and Date of Birth (mm/dd/yyyy) as indicated and click Submit. You will be taken to the next sign-up page. 5. Create a [employer] ID. This will be your [employer] login ID and cannot be changed, so think of one that is secure and easy to remember. 6. Create a [employer] password. You can change your password at any time. 7. Enter your Password Reset Question and Answer. This can be used at a later time if you forget your password. 8. Enter your e-mail address. You will receive e-mail notification when new information is available in [address] 9. Click Sign Up. You can now view and download portions of your medical record. 10. Click the Download Summary menu link to download a portable copy of your medical information. If you have questions, please visit the Frequently Asked Questions section of the [employer] website. Remember, [employer] is NOT to be used for urgent needs. For medical emergencies, dial 911. Now available from your iPhone and Android! General Information Please provide this summary of care documentation to your next provider. Patient Signature:  ____________________________________________________________ Date:  ____________________________________________________________  
  
Yusuf Camilo Provider Signature:  ____________________________________________________________ Date:  ____________________________________________________________

## 2017-04-15 NOTE — ED NOTES
Bedside shift change report given to Rita Thompson RN (oncoming nurse) received from OMAYRA Strickland (offgoing nurse). Report included the following information SBAR, Kardex, ED Summary, Procedure Summary, MAR and Recent Results. Pt in bed resting comfortably at this time.

## 2017-04-15 NOTE — ED NOTES
Bedside and Verbal shift change report given to OMAYRA Roman (oncoming nurse) by Kellie Garcia (offgoing nurse). Report included the following information SBAR, ED Summary, Intake/Output, MAR and Recent Results. Monitor x 3. Call bell in reach. Bed in lowest position, with side rails up x 2. Pt updated on plan of care. Family at bedside.

## 2017-04-15 NOTE — CONSULTS
Consult    NAME: Jason Golden   :  1945   MRN:  [id number]     Date/Time:  4/15/2017 6:11 PM    Patient PCP: Cira Veliz MD  ________________________________________________________________________     Assessment:     1. Diuretic stopped due to gout, chronic steroid use then Dyspnea, chest pain, found to have uncontrolled hypertension, pulm edema on CXR  2. ECG sinus with LVH  3. HTN  4. DM  5. Dyslpidemia  6. CKD  7. Plt of 115 but ok in past  8. Inflammatory arthritis on chronic steroids  9. Quit tobacco  10. BPH  11. Back surgery  12. Anemia with hx of gastritis with prior endoscopy  13. , retired from [employer], uses a cane        Plan:     Several hospitalizations over the years. Big issue has been an inflamatory arthritis, he is on chronic steroids for this. At some point recently his indapamide was stopped, since then bp has been elevated. Over last two weeks dyspnea and chest pain, ECG with no acute change, troponin equivicol, CXR with edema, not a lot of edema on exam.    Hospitalist admission to help with his multiple medical issues. Will start with control of bp, gentle diuresis, check echo. Check serial cardiac markers, will decide on further ischemic evaluation based on this. Plt on low side, but ok in past, will repeat tomorrow. 1. Add aspirin  2. Change toprol to coreg 12.5mg bid, titrate as able  3. Cont lisinopril  4. Add NTP  5. Lasix 20mg daily, follow bmp  6. Cont statin    Will follow. [x]        High complexity decision making was performed        Subjective:   CHIEF COMPLAINT: Dyspnea    HISTORY OF PRESENT ILLNESS:       Jason Golden is a 66 y.o. male with PMHx of HTN, and DM [facility]/o [facility] and intermittent mid chest tightness for a couple weeks, but significantly worsening since this morning. He reports additional symptom of dry cough.  Pt notes that he saw his PCP when his symptoms initially started, and he was given Mucinex and an inhaler. He has been using the medications, and was seeing improvement in his symptoms. However, today his symptoms have worsened. He reports that his chest tightness is exacerbated with exertion and relieved by rest. Pt notes that his chest tightness episodes last ~10-15 minutes at a time. He reports that he had a cardiac catheterization done ~10 years ago, and did not get a stent placed. Pt denies nausea, vomiting, diaphoresis, leg swelling, hx of heart issues, hx of COPD, or hx of asthma. Feels his bp has been elevated ever since stopping indapamide. Issues with arthritis and increase in prednisone use. Over last two weeks dyspnea and chest tightness at rest as well as with exertion. Today decided to have it checked out. Currently at rest feels ok, no chest pain, no dyspnea, no orthopnea, on edema, no palpitations. We were asked to consult for work up and evaluation of the above problems.      Past Medical History:   Diagnosis Date    Arthritis     Chronic back pain     Diabetes ([facility] [address]     Dyslipidemia     Gout     Hypertension     Left wrist fracture     Renal cancer [facility])       Past Surgical History:   Procedure Laterality Date    COLONOSCOPY,DIAGNOSTIC  3/24/2016         COLONOSCOPY,TANA CONKLIN,SNARE  3/24/2016         HX ORTHOPAEDIC      Back and right knee surgery    HX ORTHOPAEDIC      carpal tunnel bilateral    RENAL SCOPE,W/RESECTION,TUMOR      UPPER GI ENDOSCOPY,BIOPSY  3/23/2016          No Known Allergies   Meds:  See below  Social History   Substance Use Topics    Smoking status: Former Smoker     Packs/day: 1.00     Years: 45.00     Quit date: 3/12/2005    Smokeless tobacco: Never Used    Alcohol use No      Comment: occasional      Family History   Problem Relation Age of Onset    Breast Cancer Mother     Diabetes Mother     Prostate Cancer Father     Cancer Sister      lung    Cancer Brother        REVIEW OF SYSTEMS:     []         Unable to obtain ROS due to ---   [x]         Total of 12 systems reviewed as follows: Total of 12 systems reviewed as follows:       POSITIVE= Bold text  Negative = normal text  General:  fever, chills, sweats, generalized weakness, weight loss/gain,      loss of appetite   Eyes:    blurred vision, eye pain, loss of vision, double vision  ENT:    rhinorrhea, pharyngitis   Respiratory:   cough, sputum production, SOB, OG, wheezing, pleuritic pain   Cardiology:   chest pain, palpitations, orthopnea, PND, edema, syncope   Gastrointestinal:  abdominal pain , N/V, diarrhea, dysphagia, constipation, bleeding   Genitourinary:  frequency, urgency, dysuria, hematuria, incontinence   Muskuloskeletal :  arthralgia, myalgia, back pain  Hematology:  easy bruising, nose or gum bleeding, lymphadenopathy   Dermatological: rash, ulceration, pruritis, color change / jaundice  Endocrine:   hot flashes or polydipsia   Neurological:  headache, dizziness, confusion, focal weakness, paresthesia,     Speech difficulties, memory loss, gait difficulty  Psychological: Feelings of anxiety, depression, agitation    Objective:      Physical Exam:    Last 24hrs VS reviewed since prior progress note. Most recent are:    Visit Vitals    BP (!) 166/98 (BP 1 Location: Right arm, BP Patient Position: At rest)    Pulse 78    Temp 97.7 °F (36.5 °C)    Resp 24    Ht 6' (1.829 m)    Wt 86.6 kg (191 lb)    SpO2 98%    BMI 25.9 kg/m2     No intake or output data in the 24 hours ending 04/15/17 1811     General Appearance: Well developed, well nourished, alert & oriented x 3,    no acute distress. Ears/Nose/Mouth/Throat: Pupils equal and round, Hearing grossly normal.  Neck: Supple. JVP within normal limits. Carotids good upstrokes, with no bruit. Chest: Lungs bibasilar crackles to auscultation bilaterally. Cardiovascular: Regular rate and rhythm, S1S2 normal, no murmur, rubs, gallops. Abdomen: Soft, non-tender, bowel sounds are active.  No organomegaly. Extremities: Trace edema bilaterally. Femoral pulses +2, Distal Pulses +1. Skin: Warm and dry. Neuro: CN II-XII grossly intact, Strength and sensation grossly intact. Data:      Prior to Admission medications    Medication Sig Start Date End Date Taking? Authorizing Provider   HYDROcodone-acetaminophen (NORCO) 5-325 mg per tablet Take 1 Tab by mouth every four (4) hours as needed for Pain. Max Daily Amount: 6 Tabs. 3/7/17   Harrell Goodell, MD   cholecalciferol (VITAMIN D3) 1,000 unit cap Take  by mouth daily. Historical Provider   predniSONE (DELTASONE) 5 mg tablet Take 2 tabs po qam with breakfast 1/19/17   Rosa Carr PA-C   allopurinol (ZYLOPRIM) 100 mg tablet Take 1 and 1/2 tabs po daily. THIS IS A NEW AND HIGHER DOSE. 1/19/17   Rosa Carr PA-C   colchicine 0.6 mg tablet Take 1 tab po daily or as directed 1/19/17   Rosa Carr PA-C   lisinopril (PRINIVIL, ZESTRIL) 20 mg tablet Take  by mouth daily. Historical Provider   ferrous sulfate 325 mg (65 mg iron) tablet TAKE 1 TABLET BY MOUTH EVERY DAY 12/13/16   Historical Provider   folic acid (FOLVITE) 1 mg tablet 1 TABLET ONCE A DAY ORALLY 30 DAY(S) 12/13/16   Historical Provider   glipiZIDE SR (GLUCOTROL XL) 5 mg CR tablet Take 5 mg by mouth two (2) times a day. 12/26/16   Historical Provider   pantoprazole (PROTONIX) 40 mg tablet Take 40 mg by mouth daily. Historical Provider   traMADol (ULTRAM) 50 mg tablet Take 50 mg by mouth every six (6) hours as needed for Pain. Historical Provider   oxyCODONE-acetaminophen (PERCOCET) 5-325 mg per tablet Take 1-2 Tabs by mouth every four (4) hours as needed for Pain. Max Daily Amount: 12 Tabs. 11/18/16   Elnoria Sole, DO   senna-docusate (SENNA WITH DOCUSATE SODIUM) 8.6-50 mg per tablet Take 1 Tab by mouth two (2) times a day.  Not taking    Phys Other, MD   atorvastatin (LIPITOR) 10 mg tablet TAKE 1 TABLET BY MOUTH ONCE A DAY 8/31/16   Historical Provider   glyBURIDE (DIABETA) 2.5 mg tablet Not taking. 9/8/16   Historical Provider   metoprolol succinate (TOPROL-XL) 50 mg XL tablet Take 50 mg by mouth daily. Historical Provider   tamsulosin (FLOMAX) 0.4 mg capsule Take 0.4 mg by mouth daily. Maverick Thrasher MD       Recent Results (from the past 24 hour(s))   EKG, 12 LEAD, INITIAL    Collection Time: 04/15/17 12:29 PM   Result Value Ref Range    Ventricular Rate 80 BPM    Atrial Rate 80 BPM    P-R Interval 178 ms    QRS Duration 82 ms    Q-T Interval 406 ms    QTC Calculation (Bezet) 468 ms    Calculated P Axis 71 degrees    Calculated R Axis 6 degrees    Calculated T Axis 67 degrees    Diagnosis       Normal sinus rhythm  Moderate voltage criteria for LVH, may be normal variant  Nonspecific T wave abnormality  Prolonged QT  Abnormal ECG  When compared with ECG of 28-SEP-2016 21:08,  Sinus rhythm has replaced Ectopic atrial rhythm  Nonspecific T wave abnormality, worse in Lateral leads  QT has lengthened     CBC WITH AUTOMATED DIFF    Collection Time: 04/15/17  1:10 PM   Result Value Ref Range    WBC 5.4 4.1 - 11.1 K/uL    RBC 4.15 4.10 - 5.70 M/uL    HGB 12.7 12.1 - 17.0 g/dL    HCT 39.9 36.6 - 50.3 %    MCV 96.1 80.0 - 99.0 FL    MCH 30.6 26.0 - 34.0 PG    MCHC 31.8 30.0 - 36.5 g/dL    RDW 15.3 (H) 11.5 - 14.5 %    PLATELET 259 (L) 373 - 400 K/uL    NEUTROPHILS 83 (H) 32 - 75 %    LYMPHOCYTES 13 12 - 49 %    MONOCYTES 4 (L) 5 - 13 %    EOSINOPHILS 0 0 - 7 %    BASOPHILS 0 0 - 1 %    ABS. NEUTROPHILS 4.5 1.8 - 8.0 K/UL    ABS. LYMPHOCYTES 0.7 (L) 0.8 - 3.5 K/UL    ABS. MONOCYTES 0.2 0.0 - 1.0 K/UL    ABS. EOSINOPHILS 0.0 0.0 - 0.4 K/UL    ABS.  BASOPHILS 0.0 0.0 - 0.1 K/UL    RBC COMMENTS NORMOCYTIC, NORMOCHROMIC      DF SMEAR SCANNED     METABOLIC PANEL, COMPREHENSIVE    Collection Time: 04/15/17  1:10 PM   Result Value Ref Range    Sodium 144 136 - 145 mmol/L    Potassium 4.1 3.5 - 5.1 mmol/L    Chloride 111 (H) 97 - 108 mmol/L    CO2 24 21 - 32 mmol/L    Anion gap 9 5 - 15 mmol/L    Glucose 151 (H) 65 - 100 mg/dL    BUN 17 6 - 20 MG/DL    Creatinine 1.08 0.70 - 1.30 MG/DL    BUN/Creatinine ratio 16 12 - 20      GFR est AA >60 >60 ml/min/1.73m2    GFR est non-AA >60 >60 ml/min/1.73m2    Calcium 7.7 (L) 8.5 - 10.1 MG/DL    Bilirubin, total 0.5 0.2 - 1.0 MG/DL    ALT (SGPT) 31 12 - 78 U/L    AST (SGOT) 23 15 - 37 U/L    Alk. phosphatase 64 45 - 117 U/L    Protein, total 6.7 6.4 - 8.2 g/dL    Albumin 3.2 (L) 3.5 - 5.0 g/dL    Globulin 3.5 2.0 - 4.0 g/dL    A-G Ratio 0.9 (L) 1.1 - 2.2     CK W/ REFLX CKMB    Collection Time: 04/15/17  1:10 PM   Result Value Ref Range     (H) 39 - 308 U/L   TROPONIN I    Collection Time: 04/15/17  1:10 PM   Result Value Ref Range    Troponin-I, Qt. 0.28 (H) <0.05 ng/mL   PROTHROMBIN TIME + INR    Collection Time: 04/15/17  1:10 PM   Result Value Ref Range    INR 1.1 0.9 - 1.1      Prothrombin time 10.8 9.0 - 11.1 sec   PTT    Collection Time: 04/15/17  1:10 PM   Result Value Ref Range    aPTT 27.7 22.1 - 32.5 sec    aPTT, therapeutic range     58.0 - 77.0 SECS   CK-MB,QUANT.     Collection Time: 04/15/17  1:10 PM   Result Value Ref Range    CK - MB 5.8 (H) <3.6 NG/ML    CK-MB Index 1.3 0 - 2.5     URINALYSIS W/ REFLEX CULTURE    Collection Time: 04/15/17  2:06 PM   Result Value Ref Range    Color YELLOW/STRAW      Appearance CLEAR CLEAR      Specific gravity 1.012 1.003 - 1.030      pH (UA) 6.0 5.0 - 8.0      Protein 100 (A) NEG mg/dL    Glucose NEGATIVE  NEG mg/dL    Ketone NEGATIVE  NEG mg/dL    Bilirubin NEGATIVE  NEG      Blood NEGATIVE  NEG      Urobilinogen 0.2 0.2 - 1.0 EU/dL    Nitrites NEGATIVE  NEG      Leukocyte Esterase NEGATIVE  NEG      WBC 0-4 0 - 4 /hpf    RBC 0-5 0 - 5 /hpf    Epithelial cells FEW FEW /lpf    Bacteria NEGATIVE  NEG /hpf    UA:UC IF INDICATED CULTURE NOT INDICATED BY UA RESULT CNI      Hyaline cast 0-2 0 - 5 /lpf

## 2017-04-15 NOTE — ED PROVIDER NOTES
HPI Comments: Cody Brito is a 66 y.o. male with PMHx of HTN, and DM presenting ambulatory to [address] [facility] c/o SOB and intermittent mid chest tightness for a couple weeks, but significantly worsening since this morning. He reports additional symptom of dry cough. Pt notes that he saw his PCP when his symptoms initially started, and he was given Mucinex and an inhaler. He has been using the medications, and was seeing improvement in his symptoms. However, today his symptoms have worsened. He reports that his chest tightness is exacerbated with exertion and relieved by rest. Pt notes that his chest tightness episodes last ~10-15 minutes at a time. He reports that he had a cardiac catheterization done ~10 years ago, and did not get a stent placed. Pt denies nausea, vomiting, diaphoresis, leg swelling, hx of heart issues, hx of COPD, or hx of asthma. PCP: Chantelle Ziegler MD    There are no other complaints, changes, or physical findings at this time. The history is provided by the patient. No  was used. Past Medical History:   Diagnosis Date    Arthritis     Chronic back pain     Diabetes ([address]     Dyslipidemia     Gout     Hypertension     Left wrist fracture     Renal cancer [facility])        Past Surgical History:   Procedure Laterality Date    COLONOSCOPY,DIAGNOSTIC  3/24/2016         COLONOSCOPY,TANA CONKLIN,SNARE  3/24/2016         HX ORTHOPAEDIC      Back and right knee surgery    HX ORTHOPAEDIC      carpal tunnel bilateral    RENAL SCOPE,W/RESECTION,TUMOR      UPPER GI ENDOSCOPY,BIOPSY  3/23/2016              Family History:   Problem Relation Age of Onset    Breast Cancer Mother     Diabetes Mother     Prostate Cancer Father     Cancer Sister      lung    Cancer Brother        Social History     Social History    Marital status:      Spouse name: N/A    Number of children: N/A    Years of education: N/A     Occupational History    Not on file.      Social History Main Topics    Smoking status: Former Smoker     Packs/day: 1.00     Years: 45.00     Quit date: 3/12/2005    Smokeless tobacco: Never Used    Alcohol use No      Comment: occasional    Drug use: No    Sexual activity: No     Other Topics Concern    Not on file     Social History Narrative         ALLERGIES: Review of patient's allergies indicates no known allergies. Review of Systems   Constitutional: Negative for chills, diaphoresis, fever and unexpected weight change. HENT: Negative for rhinorrhea and sore throat. Eyes: Negative for pain. Respiratory: Positive for cough, chest tightness and shortness of breath. Negative for wheezing and stridor. Cardiovascular: Negative for chest pain and leg swelling. Gastrointestinal: Negative for abdominal pain, blood in stool, diarrhea, nausea and vomiting. Genitourinary: Negative for difficulty urinating, dysuria and flank pain. Musculoskeletal: Negative for back pain and neck stiffness. Skin: Negative for rash. Neurological: Negative for seizures, syncope, weakness, light-headedness and headaches. Psychiatric/Behavioral: Negative for confusion. Patient Vitals for the past 12 hrs:   Temp Pulse Resp BP SpO2   04/15/17 1545 - 78 18 - 97 %   04/15/17 1530 - 78 25 (!) 172/99 94 %   04/15/17 1500 - 82 19 (!) 178/105 93 %   04/15/17 1430 - 79 20 (!) 168/97 94 %   04/15/17 1258 - 82 - (!) 170/96 -   04/15/17 1218 97.7 °F (36.5 °C) 89 18 (!) 175/113 94 %            Physical Exam   Constitutional: He is oriented to person, place, and time. He appears well-developed and well-nourished. No distress. Hypertensive   HENT:   Nose: Nose normal.   Mouth/Throat: Oropharynx is clear and moist. No oropharyngeal exudate. Eyes: Conjunctivae and EOM are normal. Pupils are equal, round, and reactive to light. No scleral icterus. Neck: Normal range of motion. Neck supple. No JVD present.    Cardiovascular: Normal rate, regular rhythm, normal heart sounds and intact distal pulses. No murmur heard. Pulmonary/Chest: Effort normal. No stridor. No respiratory distress. He has wheezes. He has no rales. Very faint occasional wheeze   Abdominal: Soft. Bowel sounds are normal. He exhibits no distension. There is no tenderness. There is no rebound and no guarding. Well healed surgical scar on the mid abdomen   Musculoskeletal: He exhibits no edema or tenderness. No bilateral lower extremity edema   Neurological: He is alert and oriented to person, place, and time. He has normal strength. He displays no atrophy and no tremor. No cranial nerve deficit or sensory deficit. He exhibits normal muscle tone. He displays a negative Romberg sign. Coordination and gait normal.   Reflex Scores:       Bicep reflexes are 2+ on the right side and 2+ on the left side. Patellar reflexes are 2+ on the right side and 2+ on the left side. Skin: Skin is warm and dry. He is not diaphoretic. Psychiatric: He has a normal mood and affect. Nursing note and vitals reviewed. MDM  Number of Diagnoses or Management Options  Chest tightness:   Edema, unspecified type:   Elevated troponin:      Amount and/or Complexity of Data Reviewed  Clinical lab tests: ordered and reviewed  Tests in the radiology section of CPT®: ordered and reviewed  Tests in the medicine section of CPT®: ordered and reviewed  Review and summarize past medical records: yes  Discuss the patient with other providers: yes (Hospitalist)  Independent visualization of images, tracings, or specimens: yes    Patient Progress  Patient progress: stable    ED Course       Procedures    EKG interpretation: (Preliminary)  12:29 PM  Rhythm: normal sinus rhythm; and regular . Rate (approx.): 80; Axis: normal; IL interval: normal; QRS interval: normal ; ST/T wave: Nonspecific T wave abnormality; Other findings: Moderate voltage criteria for LVH.     CONSULT NOTE:  2:51 PM  Shahzad Crane MD spoke with Dr. Rosa Cota Vannesaatgi  Specialty: Cardiology  Discussed pt's hx, disposition, and available diagnostic and imaging results. Reviewed care plans. Consultant agrees with plans as outlined. Dr. Shania Mendieta requested to have the hospitalist admit the pt, and he will consult. Written by SHAY Lara, as dictated by Sakina Aguirre MD.    CONSULT NOTE:   3:26 Francisco Nicolas MD spoke with Dr. Nisha Floyd,   Specialty: Hospitalist  Discussed pt's hx, disposition, and available diagnostic and imaging results. Reviewed care plans. Consultant will evaluate pt for admission. Written by SHAY Laraibe, as dictated by Sakina Aguirre MD.    LABORATORY TESTS:  Recent Results (from the past 12 hour(s))   EKG, 12 LEAD, INITIAL    Collection Time: 04/15/17 12:29 PM   Result Value Ref Range    Ventricular Rate 80 BPM    Atrial Rate 80 BPM    P-R Interval 178 ms    QRS Duration 82 ms    Q-T Interval 406 ms    QTC Calculation (Bezet) 468 ms    Calculated P Axis 71 degrees    Calculated R Axis 6 degrees    Calculated T Axis 67 degrees    Diagnosis       Normal sinus rhythm  Moderate voltage criteria for LVH, may be normal variant  Nonspecific T wave abnormality  Prolonged QT  Abnormal ECG  When compared with ECG of 28-SEP-2016 21:08,  Sinus rhythm has replaced Ectopic atrial rhythm  Nonspecific T wave abnormality, worse in Lateral leads  QT has lengthened     CBC WITH AUTOMATED DIFF    Collection Time: 04/15/17  1:10 PM   Result Value Ref Range    WBC 5.4 4.1 - 11.1 K/uL    RBC 4.15 4.10 - 5.70 M/uL    HGB 12.7 12.1 - 17.0 g/dL    HCT 39.9 36.6 - 50.3 %    MCV 96.1 80.0 - 99.0 FL    MCH 30.6 26.0 - 34.0 PG    MCHC 31.8 30.0 - 36.5 g/dL    RDW 15.3 (H) 11.5 - 14.5 %    PLATELET 880 (L) 197 - 400 K/uL    NEUTROPHILS 83 (H) 32 - 75 %    LYMPHOCYTES 13 12 - 49 %    MONOCYTES 4 (L) 5 - 13 %    EOSINOPHILS 0 0 - 7 %    BASOPHILS 0 0 - 1 %    ABS. NEUTROPHILS 4.5 1.8 - 8.0 K/UL    ABS. LYMPHOCYTES 0.7 (L) 0.8 - 3.5 K/UL    ABS. MONOCYTES 0.2 0.0 - 1.0 K/UL    ABS. EOSINOPHILS 0.0 0.0 - 0.4 K/UL    ABS. BASOPHILS 0.0 0.0 - 0.1 K/UL    RBC COMMENTS NORMOCYTIC, NORMOCHROMIC      DF SMEAR SCANNED     METABOLIC PANEL, COMPREHENSIVE    Collection Time: 04/15/17  1:10 PM   Result Value Ref Range    Sodium 144 136 - 145 mmol/L    Potassium 4.1 3.5 - 5.1 mmol/L    Chloride 111 (H) 97 - 108 mmol/L    CO2 24 21 - 32 mmol/L    Anion gap 9 5 - 15 mmol/L    Glucose 151 (H) 65 - 100 mg/dL    BUN 17 6 - 20 MG/DL    Creatinine 1.08 0.70 - 1.30 MG/DL    BUN/Creatinine ratio 16 12 - 20      GFR est AA >60 >60 ml/min/1.73m2    GFR est non-AA >60 >60 ml/min/1.73m2    Calcium 7.7 (L) 8.5 - 10.1 MG/DL    Bilirubin, total 0.5 0.2 - 1.0 MG/DL    ALT (SGPT) 31 12 - 78 U/L    AST (SGOT) 23 15 - 37 U/L    Alk. phosphatase 64 45 - 117 U/L    Protein, total 6.7 6.4 - 8.2 g/dL    Albumin 3.2 (L) 3.5 - 5.0 g/dL    Globulin 3.5 2.0 - 4.0 g/dL    A-G Ratio 0.9 (L) 1.1 - 2.2     CK W/ REFLX CKMB    Collection Time: 04/15/17  1:10 PM   Result Value Ref Range     (H) 39 - 308 U/L   TROPONIN I    Collection Time: 04/15/17  1:10 PM   Result Value Ref Range    Troponin-I, Qt. 0.28 (H) <0.05 ng/mL   PROTHROMBIN TIME + INR    Collection Time: 04/15/17  1:10 PM   Result Value Ref Range    INR 1.1 0.9 - 1.1      Prothrombin time 10.8 9.0 - 11.1 sec   PTT    Collection Time: 04/15/17  1:10 PM   Result Value Ref Range    aPTT 27.7 22.1 - 32.5 sec    aPTT, therapeutic range     58.0 - 77.0 SECS   CK-MB,QUANT.     Collection Time: 04/15/17  1:10 PM   Result Value Ref Range    CK - MB 5.8 (H) <3.6 NG/ML    CK-MB Index 1.3 0 - 2.5     URINALYSIS W/ REFLEX CULTURE    Collection Time: 04/15/17  2:06 PM   Result Value Ref Range    Color YELLOW/STRAW      Appearance CLEAR CLEAR      Specific gravity 1.012 1.003 - 1.030      pH (UA) 6.0 5.0 - 8.0      Protein 100 (A) NEG mg/dL    Glucose NEGATIVE  NEG mg/dL    Ketone NEGATIVE  NEG mg/dL    Bilirubin NEGATIVE  NEG      Blood NEGATIVE  NEG Urobilinogen 0.2 0.2 - 1.0 EU/dL    Nitrites NEGATIVE  NEG      Leukocyte Esterase NEGATIVE  NEG      WBC 0-4 0 - 4 /hpf    RBC 0-5 0 - 5 /hpf    Epithelial cells FEW FEW /lpf    Bacteria NEGATIVE  NEG /hpf    UA:UC IF INDICATED CULTURE NOT INDICATED BY UA RESULT CNI      Hyaline cast 0-2 0 - 5 /lpf       IMAGING RESULTS:    EXAM: XR CHEST PORT.     INDICATION: Shortness of breath.     COMPARISON: 9/28/2016.     FINDINGS:   A portable AP radiograph of the chest was obtained at 1233 hours. The  positioning is lordotic. Lines and tubes: The patient is on a cardiac monitor. Lungs: There is mild interstitial prominence throughout the lungs which could  represent interstitial edema. Pleura: There is no pneumothorax or pleural effusion. Mediastinum: The cardiac silhouette is enlarged. The aorta is minimally  atherosclerotic. Bones and soft tissues: The bones and soft tissues are grossly within normal  limits.     IMPRESSION  IMPRESSION: Cardiomegaly with probable mild interstitial edema. MEDICATIONS GIVEN:  Medications   aspirin chewable tablet 324 mg (324 mg Oral Given 4/15/17 1258)   nitroglycerin (NITROBID) 2 % ointment 1 Inch (1 Inch Topical Given 4/15/17 1258)       IMPRESSION:  1. Elevated troponin    2. Chest tightness    3. Edema, unspecified type        PLAN:  1. Admit to hospitalist    ADMIT NOTE:  3:26 PM  Patient is being admitted to the hospital by Dr. Antoine Reese. The results of their tests and reasons for their admission have been discussed with the patient and/or available family. They convey agreement and understanding for the need to be admitted and for their admission diagnosis. This note is prepared by Komal Luong, acting as Scribe for MD Fabiano Myrick MD: The scribe's documentation has been prepared under my direction and personally reviewed by me in its entirety.  I confirm that the note above accurately reflects all work, treatment, procedures, and medical decision making performed by me.

## 2017-04-16 LAB
ANION GAP BLD CALC-SCNC: 8 MMOL/L (ref 5–15)
ATRIAL RATE: 80 BPM
BASOPHILS # BLD AUTO: 0 K/UL (ref 0–0.1)
BASOPHILS # BLD: 0 % (ref 0–1)
BNP SERPL-MCNC: 467 PG/ML (ref 0–100)
BUN SERPL-MCNC: 15 MG/DL (ref 6–20)
BUN/CREAT SERPL: 14 (ref 12–20)
CALCIUM SERPL-MCNC: 8.1 MG/DL (ref 8.5–10.1)
CALCULATED P AXIS, ECG09: 71 DEGREES
CALCULATED R AXIS, ECG10: 6 DEGREES
CALCULATED T AXIS, ECG11: 67 DEGREES
CHLORIDE SERPL-SCNC: 110 MMOL/L (ref 97–108)
CK SERPL-CCNC: 250 U/L (ref 39–308)
CO2 SERPL-SCNC: 26 MMOL/L (ref 21–32)
CREAT SERPL-MCNC: 1.09 MG/DL (ref 0.7–1.3)
DIAGNOSIS, 93000: NORMAL
EOSINOPHIL # BLD: 0.1 K/UL (ref 0–0.4)
EOSINOPHIL NFR BLD: 1 % (ref 0–7)
ERYTHROCYTE [DISTWIDTH] IN BLOOD BY AUTOMATED COUNT: 15.5 % (ref 11.5–14.5)
GLUCOSE BLD STRIP.AUTO-MCNC: 106 MG/DL (ref 65–100)
GLUCOSE BLD STRIP.AUTO-MCNC: 147 MG/DL (ref 65–100)
GLUCOSE BLD STRIP.AUTO-MCNC: 148 MG/DL (ref 65–100)
GLUCOSE BLD STRIP.AUTO-MCNC: 155 MG/DL (ref 65–100)
GLUCOSE SERPL-MCNC: 87 MG/DL (ref 65–100)
HCT VFR BLD AUTO: 39.4 % (ref 36.6–50.3)
HGB BLD-MCNC: 12.7 G/DL (ref 12.1–17)
LYMPHOCYTES # BLD AUTO: 30 % (ref 12–49)
LYMPHOCYTES # BLD: 1.9 K/UL (ref 0.8–3.5)
MCH RBC QN AUTO: 30.9 PG (ref 26–34)
MCHC RBC AUTO-ENTMCNC: 32.2 G/DL (ref 30–36.5)
MCV RBC AUTO: 95.9 FL (ref 80–99)
MONOCYTES # BLD: 0.5 K/UL (ref 0–1)
MONOCYTES NFR BLD AUTO: 8 % (ref 5–13)
NEUTS SEG # BLD: 3.8 K/UL (ref 1.8–8)
NEUTS SEG NFR BLD AUTO: 61 % (ref 32–75)
P-R INTERVAL, ECG05: 178 MS
PLATELET # BLD AUTO: 123 K/UL (ref 150–400)
POTASSIUM SERPL-SCNC: 3.4 MMOL/L (ref 3.5–5.1)
Q-T INTERVAL, ECG07: 406 MS
QRS DURATION, ECG06: 82 MS
QTC CALCULATION (BEZET), ECG08: 468 MS
RBC # BLD AUTO: 4.11 M/UL (ref 4.1–5.7)
SERVICE CMNT-IMP: ABNORMAL
SODIUM SERPL-SCNC: 144 MMOL/L (ref 136–145)
TROPONIN I SERPL-MCNC: 0.24 NG/ML
VENTRICULAR RATE, ECG03: 80 BPM
WBC # BLD AUTO: 6.2 K/UL (ref 4.1–11.1)

## 2017-04-16 PROCEDURE — 82962 GLUCOSE BLOOD TEST: CPT

## 2017-04-16 PROCEDURE — 82550 ASSAY OF CK (CPK): CPT | Performed by: INTERNAL MEDICINE

## 2017-04-16 PROCEDURE — 36415 COLL VENOUS BLD VENIPUNCTURE: CPT | Performed by: INTERNAL MEDICINE

## 2017-04-16 PROCEDURE — 80048 BASIC METABOLIC PNL TOTAL CA: CPT | Performed by: INTERNAL MEDICINE

## 2017-04-16 PROCEDURE — 74011250637 HC RX REV CODE- 250/637: Performed by: INTERNAL MEDICINE

## 2017-04-16 PROCEDURE — 74011250636 HC RX REV CODE- 250/636: Performed by: INTERNAL MEDICINE

## 2017-04-16 PROCEDURE — 74011636637 HC RX REV CODE- 636/637: Performed by: INTERNAL MEDICINE

## 2017-04-16 PROCEDURE — 65660000000 HC RM CCU STEPDOWN

## 2017-04-16 PROCEDURE — 83880 ASSAY OF NATRIURETIC PEPTIDE: CPT | Performed by: INTERNAL MEDICINE

## 2017-04-16 PROCEDURE — 77010033678 HC OXYGEN DAILY

## 2017-04-16 PROCEDURE — 85025 COMPLETE CBC W/AUTO DIFF WBC: CPT | Performed by: INTERNAL MEDICINE

## 2017-04-16 PROCEDURE — 84484 ASSAY OF TROPONIN QUANT: CPT | Performed by: INTERNAL MEDICINE

## 2017-04-16 RX ORDER — POTASSIUM CHLORIDE 750 MG/1
40 TABLET, FILM COATED, EXTENDED RELEASE ORAL
Status: COMPLETED | OUTPATIENT
Start: 2017-04-16 | End: 2017-04-16

## 2017-04-16 RX ORDER — SODIUM CHLORIDE 9 MG/ML
75 INJECTION, SOLUTION INTRAVENOUS CONTINUOUS
Status: DISCONTINUED | OUTPATIENT
Start: 2017-04-17 | End: 2017-04-17

## 2017-04-16 RX ORDER — POTASSIUM CHLORIDE 750 MG/1
10 TABLET, FILM COATED, EXTENDED RELEASE ORAL DAILY
Status: DISCONTINUED | OUTPATIENT
Start: 2017-04-17 | End: 2017-04-18 | Stop reason: HOSPADM

## 2017-04-16 RX ADMIN — NITROGLYCERIN 1 INCH: 20 OINTMENT TOPICAL at 06:44

## 2017-04-16 RX ADMIN — PANTOPRAZOLE SODIUM 40 MG: 40 TABLET, DELAYED RELEASE ORAL at 10:26

## 2017-04-16 RX ADMIN — NITROGLYCERIN 1 INCH: 20 OINTMENT TOPICAL at 17:47

## 2017-04-16 RX ADMIN — ALLOPURINOL 150 MG: 300 TABLET ORAL at 10:26

## 2017-04-16 RX ADMIN — ATORVASTATIN CALCIUM 10 MG: 10 TABLET, FILM COATED ORAL at 20:51

## 2017-04-16 RX ADMIN — FUROSEMIDE 20 MG: 10 INJECTION, SOLUTION INTRAMUSCULAR; INTRAVENOUS at 10:24

## 2017-04-16 RX ADMIN — COLCHICINE 0.6 MG: 0.6 TABLET, FILM COATED ORAL at 10:32

## 2017-04-16 RX ADMIN — INSULIN LISPRO 2 UNITS: 100 INJECTION, SOLUTION INTRAVENOUS; SUBCUTANEOUS at 17:48

## 2017-04-16 RX ADMIN — HYDROCODONE BITARTRATE AND ACETAMINOPHEN 1 TABLET: 5; 325 TABLET ORAL at 20:50

## 2017-04-16 RX ADMIN — Medication 5 ML: at 22:00

## 2017-04-16 RX ADMIN — LISINOPRIL 20 MG: 20 TABLET ORAL at 10:26

## 2017-04-16 RX ADMIN — ENOXAPARIN SODIUM 40 MG: 40 INJECTION SUBCUTANEOUS at 20:52

## 2017-04-16 RX ADMIN — ASPIRIN 81 MG CHEWABLE TABLET 81 MG: 81 TABLET CHEWABLE at 10:26

## 2017-04-16 RX ADMIN — INSULIN LISPRO 2 UNITS: 100 INJECTION, SOLUTION INTRAVENOUS; SUBCUTANEOUS at 13:12

## 2017-04-16 RX ADMIN — Medication 10 ML: at 06:44

## 2017-04-16 RX ADMIN — Medication 5 ML: at 14:00

## 2017-04-16 RX ADMIN — POTASSIUM CHLORIDE 40 MEQ: 750 TABLET, FILM COATED, EXTENDED RELEASE ORAL at 10:24

## 2017-04-16 RX ADMIN — HYDROCODONE BITARTRATE AND ACETAMINOPHEN 1 TABLET: 5; 325 TABLET ORAL at 14:50

## 2017-04-16 RX ADMIN — CARVEDILOL 12.5 MG: 12.5 TABLET, FILM COATED ORAL at 10:26

## 2017-04-16 RX ADMIN — NITROGLYCERIN 1 INCH: 20 OINTMENT TOPICAL at 13:13

## 2017-04-16 RX ADMIN — PREDNISONE 7.5 MG: 5 TABLET ORAL at 10:25

## 2017-04-16 RX ADMIN — CARVEDILOL 12.5 MG: 12.5 TABLET, FILM COATED ORAL at 17:47

## 2017-04-16 RX ADMIN — NITROGLYCERIN 1 INCH: 20 OINTMENT TOPICAL at 23:10

## 2017-04-16 RX ADMIN — ACETAMINOPHEN 650 MG: 325 TABLET, FILM COATED ORAL at 04:04

## 2017-04-16 NOTE — PROGRESS NOTES
0700: Report received from Carol Murrieta., OMAYRA BRYAN, Kai Mccoy, ED SUMMARY, [facility], RECENT RESULTS were discussed.     Feli Sarmiento RN

## 2017-04-16 NOTE — PROGRESS NOTES
TRANSFER - IN REPORT:    Verbal report received from  [facility] on Laura Atkinson  being received from ed(unit) for routine progression of care      Report consisted of patients Situation, Background, Assessment and   Recommendations(SBAR). Information from the following report(s) SBAR, Kardex, ED Summary, Intake/Output, MAR, Recent Results and Cardiac Rhythm nsr pvc was reviewed with the receiving nurse. Opportunity for questions and clarification was provided. Assessment completed upon patients arrival to unit and care assumed.        Primary Nurse Andie Al RN and OMAYRA amanda performed a dual skin assessment on this patient No impairment noted  Brandon score is 20

## 2017-04-16 NOTE — ED NOTES
TRANSFER - OUT REPORT:    Verbal report given to Rosa Madison RN (name) on Ginger Anderson  being transferred to [facility] Megan Diaz (unit) for routine progression of care       Report consisted of patients Situation, Background, Assessment and   Recommendations(SBAR). Information from the following report(s) SBAR, Kardex, ED Summary, Intake/Output, MAR, Recent Results and Cardiac Rhythm NSR was reviewed with the receiving nurse. Lines:   Peripheral IV 04/15/17 Left Antecubital (Active)   Site Assessment Clean, dry, & intact 4/15/2017  1:00 PM   Phlebitis Assessment 0 4/15/2017  1:00 PM   Infiltration Assessment 0 4/15/2017  1:00 PM   Dressing Status Clean, dry, & intact 4/15/2017  1:00 PM   Dressing Type Transparent 4/15/2017  1:00 PM   Hub Color/Line Status Pink;Capped;Flushed 4/15/2017  1:00 PM        Opportunity for questions and clarification was provided. Patient transported with:   O2 @ 2L NC liters  Tech   Dual RN skin assessment to be completed by floor RN. Belongings transferred to floor with patient.

## 2017-04-16 NOTE — ED NOTES
Called floor for room assignment, reports multiple rooms are being stat cleaned at this time and nurse will callback to take report when room is clean. Given extension to Pod 1 for return call.

## 2017-04-16 NOTE — PROGRESS NOTES
Hospitalist Progress Note    NAME: Jagdish Britt   :  1945   MRN:  [id number]     Assessment / Plan:  Diabetes mellitus 2 uncontrolled  -holding glipizde given impending NPO state for tomorrow cardiac testing  -SSI     Gout, arthritis  -resuming home medicine while IP    Chest pressure with elevated troponin  Pulmonary edema  -trop trending down  -no chest pain  -given cardiac RF for cath tomorrow  -following cardiology recs  -asa, coreg, acei, ntp, statin  -agree with pre cath hydration - following with cardiology        Code Status: full  Surrogate Decision Maker: wife  DVT Prophylaxis: lovenox  GI Prophylaxis: not indicated  Baseline: independent     Subjective:     Chief Complaint / Reason for Physician Visit  \"i feel normal now\". Discussed with RN events overnight. Review of Systems:  Symptom Y/N Comments  Symptom Y/N Comments   Fever/Chills n   Chest Pain n    Poor Appetite n   Edema     Cough n   Abdominal Pain     Sputum    Joint Pain n    SOB/OG n   Pruritis/Rash     Nausea/vomit n   Tolerating PT/OT     Diarrhea n   Tolerating Diet y    Constipation    Other       Could NOT obtain due to:      Objective:     VITALS:   Last 24hrs VS reviewed since prior progress note.  Most recent are:  Patient Vitals for the past 24 hrs:   Temp Pulse Resp BP SpO2   17 1458 97.9 °F (36.6 °C) 79 20 151/81 97 %   17 1048 97.4 °F (36.3 °C) 78 20 164/76 97 %   17 0723 97.6 °F (36.4 °C) 79 20 154/79 97 %   17 0644 - 79 - 168/89 -   17 0404 98 °F (36.7 °C) 75 20 166/84 99 %   04/15/17 2338 - 74 - 149/78 -   04/15/17 2233 97.9 °F (36.6 °C) 85 20 (!) 160/96 95 %   04/15/17 2130 -  24 (!) 159/94 98 %   04/15/17 2100 -  22 (!) 160/93 97 %   04/15/17 2030 -  22 (!) 166/93 97 %   04/15/17 2000 97.9 °F (36.6 °C) 81 20 158/90 96 %   04/15/17 1933 - 87 24 166/89 95 %   04/15/17 1851 - 81 22 153/90 97 %   04/15/17 1843 - 83 - 153/90 -   04/15/17 163 -  24 - 98 %   04/15/17 163 -  26 (!) 166/98 98 %   04/15/17 1545 - 78 18 - 97 %   04/15/17 1530 - 78 25 (!) 172/99 94 %       Intake/Output Summary (Last 24 hours) at 04/16/17 1515  Last data filed at 04/16/17 1034   Gross per 24 hour   Intake              240 ml   Output              800 ml   Net             -560 ml        PHYSICAL EXAM:  General: WD, thin bm sitting in chair, Alert, cooperative, no acute distress    EENT:  EOMI. Anicteric sclerae. MMM  Resp:  CTA bilaterally, no wheezing or rales. No accessory muscle use  CV:  Regular  rhythm,  No edema  GI:  Soft, Non distended, Non tender.  +Bowel sounds  Neurologic:  Alert and oriented X 3, normal speech  Psych:   Good insight. Not anxious nor agitated  Skin:  no rashes or ulcers. No jaundice    Reviewed most current lab test results and cultures  YES  Reviewed most current radiology test results   YES  Review and summation of old records today    NO  Reviewed patient's current orders and MAR    YES  PMH/ reviewed - no change compared to H&P  ________________________________________________________________________  Care Plan discussed with:    Comments   Patient x Discussed with aptient in room. Discussed cp, discussed sob. Discussed bp, dm, cad. Questions answered 15   Family      RN x 5   Care Manager     Consultant: x cardioogy 5                     Multidiciplinary team rounds were held today with , nursing, pharmacist and clinical coordinator. Patient's plan of care was discussed; medications were reviewed and discharge planning was addressed.     ________________________________________________________________________  Total NON critical care TIME:  25   Minutes    Total CRITICAL CARE TIME Spent:   Minutes non procedure based      Comments   >50% of visit spent in counseling and coordination of care x See above   ________________________________________________________________________  Procedures: see electronic medical records for all procedures/Xrays and details which were not copied into this note but were reviewed prior to creation of Plan. LABS:  Recent Labs      04/16/17   0408  04/15/17   1310   WBC  6.2  5.4   HGB  12.7  12.7   HCT  39.4  39.9   PLT  123*  115*     Recent Labs      04/16/17   0408  04/15/17   1310   NA  144  144   K  3.4*  4.1   CL  110*  111*   CO2  26  24   BUN  15  17   CREA  1.09  1.08   GLU  87  151*   CA  8.1*  7.7*     Recent Labs      04/15/17   1310   SGOT  23   ALT  31   AP  64   TBILI  0.5   TP  6.7   ALB  3.2*   GLOB  3.5     Recent Labs      04/15/17   1310   INR  1.1   PTP  10.8   APTT  27.7      No results for input(s): FE, TIBC, PSAT, FERR in the last 72 hours. Lab Results   Component Value Date/Time    Folate 6.7 09/21/2016 03:24 AM      No results for input(s): PH, PCO2, PO2 in the last 72 hours. No results for input(s): PHI, PO2I, PCO2I in the last 72 hours.   Recent Labs      04/16/17   0408  04/15/17   1310   CKNDX   --   1.3   TROIQ  0.24*  0.28*     No results found for: CHOL, CHOLX, CHLST, CHOLV, HDL, LDL, DLDL, LDLC, DLDLP, TGL, TGLX, TRIGL, TRIGP, CHHD, CHHDX  Lab Results   Component Value Date/Time    Glucose (POC) 148 04/16/2017 01:03 PM    Glucose (POC) 106 04/16/2017 08:08 AM    Glucose (POC) 119 04/15/2017 08:03 PM    Glucose (POC) 201 09/30/2016 04:35 PM    Glucose (POC) 164 09/30/2016 11:47 AM     Lab Results   Component Value Date/Time    Color YELLOW/STRAW 04/15/2017 02:06 PM    Appearance CLEAR 04/15/2017 02:06 PM    Specific gravity 1.012 04/15/2017 02:06 PM    pH (UA) 6.0 04/15/2017 02:06 PM    Protein 100 04/15/2017 02:06 PM    Glucose NEGATIVE  04/15/2017 02:06 PM    Ketone NEGATIVE  04/15/2017 02:06 PM    Bilirubin NEGATIVE  04/15/2017 02:06 PM    Urobilinogen 0.2 04/15/2017 02:06 PM    Nitrites NEGATIVE  04/15/2017 02:06 PM    Leukocyte Esterase NEGATIVE  04/15/2017 02:06 PM    Epithelial cells FEW 04/15/2017 02:06 PM    Bacteria NEGATIVE  04/15/2017 02:06 PM    WBC 0-4 04/15/2017 02:06 PM    RBC 0-5 04/15/2017 02:06 PM       RADIOGRAPHIC STUDIES:  CXR Results  (Last 48 hours)               04/15/17 1254  XR CHEST PORT Final result    Impression:  IMPRESSION: Cardiomegaly with probable mild interstitial edema. Narrative:  EXAM:  XR CHEST PORT. INDICATION: Shortness of breath. COMPARISON: 9/28/2016. FINDINGS:    A portable AP radiograph of the chest was obtained at 1233 hours. The   positioning is lordotic. Lines and tubes: The patient is on a cardiac monitor. Lungs: There is mild interstitial prominence throughout the lungs which could   represent interstitial edema. Pleura: There is no pneumothorax or pleural effusion. Mediastinum: The cardiac silhouette is enlarged. The aorta is minimally   atherosclerotic. Bones and soft tissues: The bones and soft tissues are grossly within normal   limits. CT Results  (Last 48 hours)    None          Echo Results  (Last 48 hours)    None          VENOUS DOPPLER results  (Last 48 hours)    None          CULTURES:    Lab Results   Component Value Date/Time    Specimen Description: KNEE FLUID 09/20/2013 10:30 AM    Specimen Description: BURSAL FLUID 09/20/2013 10:30 AM    Lab Results   Component Value Date/Time    Culture result: Culture performed on Unspun Fluid 09/29/2016 11:00 AM    Culture result: NO GROWTH 14 DAYS 09/29/2016 11:00 AM    Culture result: NO GROWTH 6 DAYS 09/28/2016 07:09 PM    Culture result: NO GROWTH 6 DAYS 09/28/2016 07:09 PM    Culture result: PSEUDOMONAS AERUGINOSA 09/24/2016 10:05 PM          Signed: Marco Antonio Laura MD    This note will not be viewable in [address]

## 2017-04-16 NOTE — PROGRESS NOTES
Progress Note      4/16/2017 12:48 PM  NAME: Feliciano Gallego   MRN:  [id number]   Admit Diagnosis: Chest pressure    Assessment:      1. Diuretic stopped due to gout, chronic steroid use then Dyspnea, chest pain, found to have uncontrolled hypertension, pulm edema on CXR  2. ECG sinus with LVH  3. HTN  4. DM  5. Dyslpidemia  6. CKD  7. Plt  On low side  8. Inflammatory arthritis on chronic steroids  9. Quit tobacco  10. BPH  11. Back surgery  12. Anemia with hx of gastritis with prior endoscopy  13. , retired from [employer], uses a cane      Plan:      Several hospitalizations over the years. Big issue has been an inflamatory arthritis, he is on chronic steroids for this. At some point recently his indapamide was stopped, since then bp has been elevated. Over last two weeks dyspnea and chest pain, ECG with no acute change, troponin equivicol, CXR with edema, not a lot of edema on exam.     Feeling better. Given poor functional capacity favor cath to definitively rule in our out CAD, discussed with patient and wife, all risk/benefits/alternatives discussed and agree to proceed.     1. Add aspirin  2. Changed toprol to coreg 12.5mg bid, titrate as able  3. Cont lisinopril  4. Add NTP  5. Hold further lasix, npo p mn, hydration tomorrow am for cath, follow bmp  6. Cont statin     Will follow.      [x]  High complexity decision making was performed         Subjective:     Feliciano Gallego denies chest pain, dyspnea. Discussed with RN events overnight. Review of Systems:    Symptom Y/N Comments  Symptom Y/N Comments   Fever/Chills N   Chest Pain N    Poor Appetite N   Edema N    Cough N   Abdominal Pain N    Sputum N   Joint Pain N    SOB/OG N   Pruritis/Rash N    Nausea/vomit N   Tolerating PT/OT Y    Diarrhea N   Tolerating Diet Y    Constipation N   Other       Could NOT obtain due to:      Objective:      Physical Exam:    Last 24hrs VS reviewed since prior progress note.  Most recent are:    Visit Vitals    /76 (BP 1 Location: Right arm, BP Patient Position: At rest)    Pulse 78    Temp 97.4 °F (36.3 °C)    Resp 20    Ht 6' (1.829 m)    Wt 84.1 kg (185 lb 8 oz)    SpO2 97%    BMI 25.16 kg/m2       Intake/Output Summary (Last 24 hours) at 04/16/17 1248  Last data filed at 04/16/17 1034   Gross per 24 hour   Intake              240 ml   Output              800 ml   Net             -560 ml        General Appearance: Well developed, well nourished, alert & oriented x 3,    no acute distress. Ears/Nose/Mouth/Throat: Hearing grossly normal.  Neck: Supple. Chest: Lungs clear to auscultation bilaterally. Cardiovascular: Regular rate and rhythm, S1S2 normal, no murmur. Abdomen: Soft, non-tender, bowel sounds are active. Extremities: No edema bilaterally. Skin: Warm and dry. PMH/SH reviewed - no change compared to H&P    Data Review    Telemetry: normal sinus rhythm     Lab Data Personally Reviewed:    Recent Labs      04/16/17   0408  04/15/17   1310   WBC  6.2  5.4   HGB  12.7  12.7   HCT  39.4  39.9   PLT  123*  115*     Recent Labs      04/15/17   1310   INR  1.1   PTP  10.8   APTT  27.7      Recent Labs      04/16/17   0408  04/15/17   1310   NA  144  144   K  3.4*  4.1   CL  110*  111*   CO2  26  24   BUN  15  17   CREA  1.09  1.08   GLU  87  151*   CA  8.1*  7.7*     Recent Labs      04/16/17   0408  04/15/17   1310   CKNDX   --   1.3   TROIQ  0.24*  0.28*     No results found for: CHOL, CHOLX, CHLST, CHOLV, HDL, LDL, DLDL, LDLC, DLDLP, TGL, TGLX, TRIGL, TRIGP, CHHD, CHHDX    Recent Labs      04/15/17   1310   SGOT  23   AP  64   TP  6.7   ALB  3.2*   GLOB  3.5     No results for input(s): PH, PCO2, PO2 in the last 72 hours.     Medications Personally Reviewed:    Current Facility-Administered Medications   Medication Dose Route Frequency    [START ON 4/17/2017] potassium chloride SR (KLOR-CON 10) tablet 10 mEq  10 mEq Oral DAILY    aspirin chewable tablet 81 mg  81 mg Oral DAILY    carvedilol (COREG) tablet 12.5 mg  12.5 mg Oral BID WITH MEALS    lisinopril (PRINIVIL, ZESTRIL) tablet 20 mg  20 mg Oral DAILY    furosemide (LASIX) injection 20 mg  20 mg IntraVENous DAILY    nitroglycerin (NITROBID) 2 % ointment 1 Inch  1 Inch Topical Q6H    atorvastatin (LIPITOR) tablet 10 mg  10 mg Oral QHS    pantoprazole (PROTONIX) tablet 40 mg  40 mg Oral DAILY    allopurinol (ZYLOPRIM) tablet 150 mg  150 mg Oral DAILY    predniSONE (DELTASONE) tablet 7.5 mg  7.5 mg Oral DAILY WITH BREAKFAST    colchicine tablet 0.6 mg  0.6 mg Oral DAILY    sodium chloride (NS) flush 5-10 mL  5-10 mL IntraVENous Q8H    sodium chloride (NS) flush 5-10 mL  5-10 mL IntraVENous PRN    acetaminophen (TYLENOL) tablet 650 mg  650 mg Oral Q4H PRN    HYDROcodone-acetaminophen (NORCO) 5-325 mg per tablet 1 Tab  1 Tab Oral Q4H PRN    ondansetron (ZOFRAN) injection 4 mg  4 mg IntraVENous Q4H PRN    docusate sodium (COLACE) capsule 100 mg  100 mg Oral BID PRN    glucose chewable tablet 16 g  4 Tab Oral PRN    dextrose (D50W) injection syrg 12.5-25 g  12.5-25 g IntraVENous PRN    glucagon (GLUCAGEN) injection 1 mg  1 mg IntraMUSCular PRN    enoxaparin (LOVENOX) injection 40 mg  40 mg SubCUTAneous Q24H    insulin lispro (HUMALOG) injection   SubCUTAneous AC&HS    labetalol (NORMODYNE;TRANDATE) injection 20 mg  20 mg IntraVENous Q6H PRN         Paulina Cuello MD

## 2017-04-17 LAB
GLUCOSE BLD STRIP.AUTO-MCNC: 133 MG/DL (ref 65–100)
GLUCOSE BLD STRIP.AUTO-MCNC: 142 MG/DL (ref 65–100)
GLUCOSE BLD STRIP.AUTO-MCNC: 153 MG/DL (ref 65–100)
GLUCOSE BLD STRIP.AUTO-MCNC: 223 MG/DL (ref 65–100)
SERVICE CMNT-IMP: ABNORMAL

## 2017-04-17 PROCEDURE — C1894 INTRO/SHEATH, NON-LASER: HCPCS

## 2017-04-17 PROCEDURE — B2111ZZ FLUOROSCOPY OF MULTIPLE CORONARY ARTERIES USING LOW OSMOLAR CONTRAST: ICD-10-PCS | Performed by: INTERNAL MEDICINE

## 2017-04-17 PROCEDURE — 99153 MOD SED SAME PHYS/QHP EA: CPT

## 2017-04-17 PROCEDURE — 74011636320 HC RX REV CODE- 636/320

## 2017-04-17 PROCEDURE — 74011636637 HC RX REV CODE- 636/637: Performed by: INTERNAL MEDICINE

## 2017-04-17 PROCEDURE — 93458 L HRT ARTERY/VENTRICLE ANGIO: CPT

## 2017-04-17 PROCEDURE — 65660000000 HC RM CCU STEPDOWN

## 2017-04-17 PROCEDURE — 77030008543 HC TBNG MON PRSS MRTM -A

## 2017-04-17 PROCEDURE — 99152 MOD SED SAME PHYS/QHP 5/>YRS: CPT

## 2017-04-17 PROCEDURE — 93306 TTE W/DOPPLER COMPLETE: CPT

## 2017-04-17 PROCEDURE — C1769 GUIDE WIRE: HCPCS

## 2017-04-17 PROCEDURE — 74011000250 HC RX REV CODE- 250

## 2017-04-17 PROCEDURE — 74011250637 HC RX REV CODE- 250/637: Performed by: INTERNAL MEDICINE

## 2017-04-17 PROCEDURE — 74011250636 HC RX REV CODE- 250/636: Performed by: INTERNAL MEDICINE

## 2017-04-17 PROCEDURE — B2151ZZ FLUOROSCOPY OF LEFT HEART USING LOW OSMOLAR CONTRAST: ICD-10-PCS | Performed by: INTERNAL MEDICINE

## 2017-04-17 PROCEDURE — 77030015766

## 2017-04-17 PROCEDURE — 74011250636 HC RX REV CODE- 250/636

## 2017-04-17 PROCEDURE — 77030010221 HC SPLNT WR POS TELE -B

## 2017-04-17 PROCEDURE — 4A023N7 MEASUREMENT OF CARDIAC SAMPLING AND PRESSURE, LEFT HEART, PERCUTANEOUS APPROACH: ICD-10-PCS | Performed by: INTERNAL MEDICINE

## 2017-04-17 PROCEDURE — 77030019698 HC SYR ANGI MDLON MRTM -A

## 2017-04-17 PROCEDURE — 82962 GLUCOSE BLOOD TEST: CPT

## 2017-04-17 PROCEDURE — 77030004549 HC CATH ANGI DX PRF MRTM -A

## 2017-04-17 RX ORDER — GUAIFENESIN 100 MG/5ML
81 LIQUID (ML) ORAL DAILY
Qty: 30 TAB | Refills: 11 | Status: SHIPPED | OUTPATIENT
Start: 2017-04-17

## 2017-04-17 RX ORDER — ONDANSETRON 2 MG/ML
4 INJECTION INTRAMUSCULAR; INTRAVENOUS
Status: DISCONTINUED | OUTPATIENT
Start: 2017-04-17 | End: 2017-04-18 | Stop reason: HOSPADM

## 2017-04-17 RX ORDER — POTASSIUM CHLORIDE 750 MG/1
10 TABLET, FILM COATED, EXTENDED RELEASE ORAL DAILY
Qty: 30 TAB | Refills: 11 | Status: SHIPPED | OUTPATIENT
Start: 2017-04-17

## 2017-04-17 RX ORDER — HEPARIN SODIUM 200 [USP'U]/100ML
500 INJECTION, SOLUTION INTRAVENOUS ONCE
Status: COMPLETED | OUTPATIENT
Start: 2017-04-17 | End: 2017-04-17

## 2017-04-17 RX ORDER — SODIUM CHLORIDE 0.9 % (FLUSH) 0.9 %
5-10 SYRINGE (ML) INJECTION AS NEEDED
Status: DISCONTINUED | OUTPATIENT
Start: 2017-04-17 | End: 2017-04-18 | Stop reason: HOSPADM

## 2017-04-17 RX ORDER — FUROSEMIDE 20 MG/1
20 TABLET ORAL DAILY
Qty: 30 TAB | Refills: 11 | Status: SHIPPED | OUTPATIENT
Start: 2017-04-17

## 2017-04-17 RX ORDER — LIDOCAINE HYDROCHLORIDE 10 MG/ML
1-30 INJECTION, SOLUTION EPIDURAL; INFILTRATION; INTRACAUDAL; PERINEURAL
Status: DISCONTINUED | OUTPATIENT
Start: 2017-04-17 | End: 2017-04-17

## 2017-04-17 RX ORDER — SODIUM CHLORIDE 9 MG/ML
100 INJECTION, SOLUTION INTRAVENOUS CONTINUOUS
Status: DISPENSED | OUTPATIENT
Start: 2017-04-17 | End: 2017-04-17

## 2017-04-17 RX ORDER — CARVEDILOL 12.5 MG/1
25 TABLET ORAL 2 TIMES DAILY WITH MEALS
Status: DISCONTINUED | OUTPATIENT
Start: 2017-04-17 | End: 2017-04-18 | Stop reason: HOSPADM

## 2017-04-17 RX ORDER — MIDAZOLAM HYDROCHLORIDE 1 MG/ML
.5-2 INJECTION, SOLUTION INTRAMUSCULAR; INTRAVENOUS
Status: DISCONTINUED | OUTPATIENT
Start: 2017-04-17 | End: 2017-04-17

## 2017-04-17 RX ORDER — VERAPAMIL HYDROCHLORIDE 2.5 MG/ML
INJECTION, SOLUTION INTRAVENOUS
Status: COMPLETED
Start: 2017-04-17 | End: 2017-04-17

## 2017-04-17 RX ORDER — HEPARIN SODIUM 1000 [USP'U]/ML
2500 INJECTION, SOLUTION INTRAVENOUS; SUBCUTANEOUS
Status: DISCONTINUED | OUTPATIENT
Start: 2017-04-17 | End: 2017-04-17

## 2017-04-17 RX ORDER — FENTANYL CITRATE 50 UG/ML
25-50 INJECTION, SOLUTION INTRAMUSCULAR; INTRAVENOUS
Status: DISCONTINUED | OUTPATIENT
Start: 2017-04-17 | End: 2017-04-17

## 2017-04-17 RX ORDER — CARVEDILOL 25 MG/1
25 TABLET ORAL 2 TIMES DAILY WITH MEALS
Qty: 30 TAB | Refills: 11 | Status: SHIPPED | OUTPATIENT
Start: 2017-04-17

## 2017-04-17 RX ORDER — MIDAZOLAM HYDROCHLORIDE 1 MG/ML
INJECTION, SOLUTION INTRAMUSCULAR; INTRAVENOUS
Status: COMPLETED
Start: 2017-04-17 | End: 2017-04-17

## 2017-04-17 RX ORDER — LIDOCAINE HYDROCHLORIDE 10 MG/ML
INJECTION, SOLUTION EPIDURAL; INFILTRATION; INTRACAUDAL; PERINEURAL
Status: COMPLETED
Start: 2017-04-17 | End: 2017-04-17

## 2017-04-17 RX ORDER — HEPARIN SODIUM 1000 [USP'U]/ML
INJECTION, SOLUTION INTRAVENOUS; SUBCUTANEOUS
Status: COMPLETED
Start: 2017-04-17 | End: 2017-04-17

## 2017-04-17 RX ORDER — FENTANYL CITRATE 50 UG/ML
INJECTION, SOLUTION INTRAMUSCULAR; INTRAVENOUS
Status: COMPLETED
Start: 2017-04-17 | End: 2017-04-17

## 2017-04-17 RX ORDER — VERAPAMIL HYDROCHLORIDE 2.5 MG/ML
2.5 INJECTION, SOLUTION INTRAVENOUS ONCE
Status: COMPLETED | OUTPATIENT
Start: 2017-04-17 | End: 2017-04-17

## 2017-04-17 RX ORDER — SODIUM CHLORIDE 0.9 % (FLUSH) 0.9 %
5-10 SYRINGE (ML) INJECTION EVERY 8 HOURS
Status: DISCONTINUED | OUTPATIENT
Start: 2017-04-17 | End: 2017-04-18 | Stop reason: HOSPADM

## 2017-04-17 RX ORDER — NALOXONE HYDROCHLORIDE 0.4 MG/ML
0.4 INJECTION, SOLUTION INTRAMUSCULAR; INTRAVENOUS; SUBCUTANEOUS AS NEEDED
Status: DISCONTINUED | OUTPATIENT
Start: 2017-04-17 | End: 2017-04-18 | Stop reason: HOSPADM

## 2017-04-17 RX ORDER — ACETAMINOPHEN 325 MG/1
650 TABLET ORAL
Status: DISCONTINUED | OUTPATIENT
Start: 2017-04-17 | End: 2017-04-18 | Stop reason: HOSPADM

## 2017-04-17 RX ORDER — HEPARIN SODIUM 200 [USP'U]/100ML
INJECTION, SOLUTION INTRAVENOUS
Status: COMPLETED
Start: 2017-04-17 | End: 2017-04-17

## 2017-04-17 RX ADMIN — CARVEDILOL 25 MG: 12.5 TABLET, FILM COATED ORAL at 18:17

## 2017-04-17 RX ADMIN — NITROGLYCERIN 1 INCH: 20 OINTMENT TOPICAL at 13:32

## 2017-04-17 RX ADMIN — IOPAMIDOL 50 ML: 755 INJECTION, SOLUTION INTRAVENOUS at 12:48

## 2017-04-17 RX ADMIN — POTASSIUM CHLORIDE 10 MEQ: 750 TABLET, FILM COATED, EXTENDED RELEASE ORAL at 09:06

## 2017-04-17 RX ADMIN — SODIUM CHLORIDE 75 ML/HR: 900 INJECTION, SOLUTION INTRAVENOUS at 06:07

## 2017-04-17 RX ADMIN — ASPIRIN 81 MG CHEWABLE TABLET 81 MG: 81 TABLET CHEWABLE at 09:06

## 2017-04-17 RX ADMIN — COLCHICINE 0.6 MG: 0.6 TABLET, FILM COATED ORAL at 09:07

## 2017-04-17 RX ADMIN — NITROGLYCERIN 1 INCH: 20 OINTMENT TOPICAL at 21:43

## 2017-04-17 RX ADMIN — HEPARIN SODIUM 1000 UNITS: 200 INJECTION, SOLUTION INTRAVENOUS at 12:30

## 2017-04-17 RX ADMIN — FENTANYL CITRATE 50 MCG: 50 INJECTION, SOLUTION INTRAMUSCULAR; INTRAVENOUS at 12:27

## 2017-04-17 RX ADMIN — LIDOCAINE HYDROCHLORIDE 1 ML: 10 INJECTION, SOLUTION EPIDURAL; INFILTRATION; INTRACAUDAL; PERINEURAL at 12:33

## 2017-04-17 RX ADMIN — NITROGLYCERIN 200 MCG: 5 INJECTION, SOLUTION INTRAVENOUS at 12:34

## 2017-04-17 RX ADMIN — ENOXAPARIN SODIUM 40 MG: 40 INJECTION SUBCUTANEOUS at 21:34

## 2017-04-17 RX ADMIN — ATORVASTATIN CALCIUM 10 MG: 10 TABLET, FILM COATED ORAL at 21:34

## 2017-04-17 RX ADMIN — ACETAMINOPHEN 650 MG: 325 TABLET, FILM COATED ORAL at 21:43

## 2017-04-17 RX ADMIN — VERAPAMIL HYDROCHLORIDE 2.5 MG: 2.5 INJECTION, SOLUTION INTRAVENOUS at 12:34

## 2017-04-17 RX ADMIN — IOPAMIDOL 30 ML: 755 INJECTION, SOLUTION INTRAVENOUS at 12:47

## 2017-04-17 RX ADMIN — CARVEDILOL 18.75 MG: 12.5 TABLET, FILM COATED ORAL at 09:06

## 2017-04-17 RX ADMIN — MIDAZOLAM HYDROCHLORIDE 1 MG: 1 INJECTION INTRAMUSCULAR; INTRAVENOUS at 12:27

## 2017-04-17 RX ADMIN — VERAPAMIL HYDROCHLORIDE 2.5 MG: 2.5 INJECTION INTRAVENOUS at 12:34

## 2017-04-17 RX ADMIN — PANTOPRAZOLE SODIUM 40 MG: 40 TABLET, DELAYED RELEASE ORAL at 09:07

## 2017-04-17 RX ADMIN — PREDNISONE 7.5 MG: 5 TABLET ORAL at 13:58

## 2017-04-17 RX ADMIN — HEPARIN SODIUM 2500 UNITS: 1000 INJECTION, SOLUTION INTRAVENOUS; SUBCUTANEOUS at 12:34

## 2017-04-17 RX ADMIN — MIDAZOLAM HYDROCHLORIDE 1 MG: 1 INJECTION, SOLUTION INTRAMUSCULAR; INTRAVENOUS at 12:27

## 2017-04-17 RX ADMIN — ALLOPURINOL 150 MG: 300 TABLET ORAL at 09:06

## 2017-04-17 RX ADMIN — Medication 10 ML: at 06:05

## 2017-04-17 RX ADMIN — NITROGLYCERIN 1 INCH: 20 OINTMENT TOPICAL at 06:05

## 2017-04-17 RX ADMIN — SODIUM CHLORIDE 100 ML/HR: 900 INJECTION, SOLUTION INTRAVENOUS at 13:32

## 2017-04-17 RX ADMIN — LISINOPRIL 20 MG: 20 TABLET ORAL at 09:06

## 2017-04-17 NOTE — PROGRESS NOTES
Progress Note      4/17/2017 12:48 PM  NAME: Feliciano Gallego   MRN:  [id number]   Admit Diagnosis: Chest pressure    Assessment:      1. Diuretic stopped due to gout, chronic steroid use then Dyspnea, chest pain, found to have uncontrolled hypertension, pulm edema on CXR  2. ECG sinus with LVH  3. HTN  4. DM  5. Dyslpidemia  6. CKD  7. Plt  On low side  8. Inflammatory arthritis on chronic steroids  9. Quit tobacco  10. BPH  11. Back surgery  12. Anemia with hx of gastritis with prior endoscopy  13. , retired from [employer], uses a cane      Plan:      Several hospitalizations over the years. Big issue has been an inflamatory arthritis, he is on chronic steroids for this. At some point recently his indapamide was stopped, since then bp has been elevated. Over last two weeks dyspnea and chest pain, ECG with no acute change, troponin equivicol, CXR with edema, not a lot of edema on exam.     Feeling better. Given poor functional capacity favor cath to definitively rule in our out CAD, discussed with patient and wife, all risk/benefits/alternatives discussed and agree to proceed.     1. Cont Added aspirin  2. Changed toprol to coreg increase to 18.75mg bid  3. Cont lisinopril  4. Cont  NTP for now  5. Cath today with some hydration, will likely need diuretic low dose prior to discharge  6. Cont statin     Will follow.      [x]  High complexity decision making was performed         Subjective:     Feliciano Gallego denies chest pain, dyspnea. Discussed with RN events overnight. Review of Systems:    Symptom Y/N Comments  Symptom Y/N Comments   Fever/Chills N   Chest Pain N    Poor Appetite N   Edema N    Cough N   Abdominal Pain N    Sputum N   Joint Pain N    SOB/OG N   Pruritis/Rash N    Nausea/vomit N   Tolerating PT/OT Y    Diarrhea N   Tolerating Diet Y    Constipation N   Other       Could NOT obtain due to:      Objective:      Physical Exam:    Last 24hrs VS reviewed since prior progress note.  Most recent are:    Visit Vitals    /83    Pulse 98    Temp 97.5 °F (36.4 °C)    Resp 20    Ht 6' (1.829 m)    Wt 84.1 kg (185 lb 8 oz)    SpO2 97%    BMI 25.16 kg/m2       Intake/Output Summary (Last 24 hours) at 04/17/17 0849  Last data filed at 04/17/17 0527   Gross per 24 hour   Intake              240 ml   Output              200 ml   Net               40 ml        General Appearance: Well developed, well nourished, alert & oriented x 3,    no acute distress. Ears/Nose/Mouth/Throat: Hearing grossly normal.  Neck: Supple. Chest: Lungs clear to auscultation bilaterally. Cardiovascular: Regular rate and rhythm, S1S2 normal, no murmur. Abdomen: Soft, non-tender, bowel sounds are active. Extremities: No edema bilaterally. Skin: Warm and dry. PMH/SH reviewed - no change compared to H&P    Data Review    Telemetry: normal sinus rhythm     Lab Data Personally Reviewed:    Recent Labs      04/16/17   0408  04/15/17   1310   WBC  6.2  5.4   HGB  12.7  12.7   HCT  39.4  39.9   PLT  123*  115*     Recent Labs      04/15/17   1310   INR  1.1   PTP  10.8   APTT  27.7      Recent Labs      04/16/17   0408  04/15/17   1310   NA  144  144   K  3.4*  4.1   CL  110*  111*   CO2  26  24   BUN  15  17   CREA  1.09  1.08   GLU  87  151*   CA  8.1*  7.7*     Recent Labs      04/16/17   0408  04/15/17   1310   CKNDX   --   1.3   TROIQ  0.24*  0.28*     No results found for: CHOL, CHOLX, CHLST, CHOLV, HDL, LDL, DLDL, LDLC, DLDLP, TGL, TGLX, TRIGL, TRIGP, CHHD, CHHDX    Recent Labs      04/15/17   1310   SGOT  23   AP  64   TP  6.7   ALB  3.2*   GLOB  3.5     No results for input(s): PH, PCO2, PO2 in the last 72 hours.     Medications Personally Reviewed:    Current Facility-Administered Medications   Medication Dose Route Frequency    carvedilol (COREG) tablet 18.75 mg  18.75 mg Oral BID WITH MEALS    potassium chloride SR (KLOR-CON 10) tablet 10 mEq  10 mEq Oral DAILY    0.9% sodium chloride infusion  75 mL/hr IntraVENous CONTINUOUS    aspirin chewable tablet 81 mg  81 mg Oral DAILY    lisinopril (PRINIVIL, ZESTRIL) tablet 20 mg  20 mg Oral DAILY    nitroglycerin (NITROBID) 2 % ointment 1 Inch  1 Inch Topical Q6H    atorvastatin (LIPITOR) tablet 10 mg  10 mg Oral QHS    pantoprazole (PROTONIX) tablet 40 mg  40 mg Oral DAILY    allopurinol (ZYLOPRIM) tablet 150 mg  150 mg Oral DAILY    predniSONE (DELTASONE) tablet 7.5 mg  7.5 mg Oral DAILY WITH BREAKFAST    colchicine tablet 0.6 mg  0.6 mg Oral DAILY    sodium chloride (NS) flush 5-10 mL  5-10 mL IntraVENous Q8H    sodium chloride (NS) flush 5-10 mL  5-10 mL IntraVENous PRN    acetaminophen (TYLENOL) tablet 650 mg  650 mg Oral Q4H PRN    HYDROcodone-acetaminophen (NORCO) 5-325 mg per tablet 1 Tab  1 Tab Oral Q4H PRN    ondansetron (ZOFRAN) injection 4 mg  4 mg IntraVENous Q4H PRN    docusate sodium (COLACE) capsule 100 mg  100 mg Oral BID PRN    glucose chewable tablet 16 g  4 Tab Oral PRN    dextrose (D50W) injection syrg 12.5-25 g  12.5-25 g IntraVENous PRN    glucagon (GLUCAGEN) injection 1 mg  1 mg IntraMUSCular PRN    enoxaparin (LOVENOX) injection 40 mg  40 mg SubCUTAneous Q24H    insulin lispro (HUMALOG) injection   SubCUTAneous AC&HS    labetalol (NORMODYNE;TRANDATE) injection 20 mg  20 mg IntraVENous Q6H PRN         Kasandra Hernandes MD

## 2017-04-17 NOTE — PROGRESS NOTES
TRANSFER - IN REPORT:    Verbal report received from Maya CUTLER(name) on Nimisha Black  being received from [facility](unit) for routine progression of care      Report consisted of patients Situation, Background, Assessment and   Recommendations(SBAR). Information from the following report(s) SBAR, Kardex and Cardiac Rhythm NSR was reviewed with the receiving nurse. Opportunity for questions and clarification was provided. Assessment completed upon patients arrival to unit and care assumed.        Written report given to [facility] nurse OMAYRA ESPINOZA

## 2017-04-17 NOTE — PROGRESS NOTES
CM explained the 2nd  Medicare letter and pt understood. Pt unable to sign at this time and copy given to pt. Copy placed on pt's chart.      Natasha Reese, [address]

## 2017-04-17 NOTE — PROCEDURES
BRIEF OPERATIVE NOTE    Date of Procedure: 4/17/2017   Procedure: Cardiac catheterization    Preoperative Diagnosis: Coronary artery disease  Postoperative Diagnosis: Coronary artery disease     Surgeon/assistant: Phong Nolan MD    Anesthesia: Conscious sedation  Estimated Blood Loss: <50cc  Specimens: None    Findings:   LVEDP: 10   Left ventricular angiography: 30%   Coronary angiography: Mild in right dominant   Intervention: None    Complications: None  Non-coronary Implants: None

## 2017-04-17 NOTE — PROGRESS NOTES
0700: Report received from Fiordaliza Manzano, [facility], [facility] 23, ED SUMMARY, [facility], RECENT RESULTS were discussed.     Feli Sarmiento, RN    1562: Report called to Mary Washington

## 2017-04-17 NOTE — PROGRESS NOTES
Pt is a 66 y.o  [place] male admitted with Chest pressure. Pt was alert, oriented and in no distress. Demographic information verified and all is correct. Pt lives with his wife in a 2 story home with 4 steps to the entrance. Prior to admission, pt was independent with his ADL's and IADL's and drives. Pt has a walker, cane and crutches at home and only uses a cane. Pt had home health in the past and went to [facility] for rehab. Preferred pharmacy is [facility] on [address] [place] [address],[address].  Pt's family can transport him home at discharge. Care Management Interventions  PCP Verified by CM: Yes (Dr. Bette Fenton - saw a couple of weeks ago)  Mode of Transport at Discharge:  Other (see comment) (wife can transport)  Transition of Care Consult (CM Consult): Discharge Planning  Discharge Durable Medical Equipment: No (pt has a walker, cane and crutches)  Physical Therapy Consult: No  Occupational Therapy Consult: No  Speech Therapy Consult: No  Current Support Network: Lives with Spouse, Own Home (pt lives with his wife in a 2 story home with 4 steps to the entrance)  Confirm Follow Up Transport: Family  Discharge Location  Discharge Placement: [facility] [address]

## 2017-04-17 NOTE — DISCHARGE INSTRUCTIONS
[address] [facility], [address]   [phone]  [address]    [email or web address]    Patient Discharge Instructions    Laura Atkinson / [id number] : 1945    Admitted 4/15/2017 Discharged: 2017       · It is important that you take the medication exactly as they are prescribed. · Keep your medication in the bottles provided by the pharmacist and keep a list of the medication names, dosages, and times to be taken in your wallet. · Do not take other medications without consulting your doctor. BRING ALL OF YOUR MEDICINES TO YOUR OFFICE VISIT. Follow-up with Demi Bush MD in 2 weeks. Cardiac Catheterization  Discharge Instructions     Do not drive, operate any machinery, or sign any legal documents for 24 hours after your procedure. You must have someone to drive you home.  You may take a shower 24 hours after your cardiac catheterization. Be sure to get the dressing wet and then remove it; gently wash the area with warm soapy water. Pat dry and leave open to air. To help prevent infections, be sure to keep the cath site clean and dry. No lotions, creams, powders, ointments, etc. in the cath site for approximately 1 week.  Do not take a tub bath, get in a hot tub or swimming pool for approximately 5 days or until the cath site is completely healed.  No strenuous activity or heavy lifting over 10 lbs. for 7 days.  Drink plenty of fluids for 24-48 hours after your cath to flush the contrast dye from your kidneys. No alcoholic beverages for 24 hours. You may resume your previous diet (low fat, low cholesterol) after your cath.  After your cath, some bruising or discomfort is common during the healing process. Tylenol, 1-2 tablets every 6 hours as needed, is recommended if you experience any discomfort.   If you experience any signs or symptoms of infection such as fever, chills, or poorly healing incision, persistent tenderness or swelling in the groin, redness and/or warmth to the touch, numbness, significant tingling or pain at the groin site or affected extremity, rash, drainage from the cath site, or if the leg feels tight or swollen, call your physician right away.  If bleeding at the cath site occurs, take a clean gauze pad and apply direct pressure to the groin just above the puncture site. Call 911 immediately, and continue to apply direct pressure until an ambulance gets to your location.  You may return to work  2  days after your cardiac cath if no groin bleeding. Information obtained by :  I understand that if any problems occur once I am at home I am to contact my physician. I understand and acknowledge receipt of the instructions indicated above. R.N.'s Signature                                                                  Date/Time                                                                                                                                              Patient or Representative Signature                                                          Date/Time      Garrick Wagner MD             [address] [place] [address], [address]    [phone]  [address]    [email or web address]

## 2017-04-17 NOTE — PROGRESS NOTES
Hospitalist Progress Note    NAME: Fuad Reaves   :  1945   MRN:  [id number]     Assessment / Plan:  Diabetes mellitus 2 uncontrolled  -holding glipizde for now. Once dc to home can resume home regimen     Gout, arthritis  -resuming home medicine while IP  -agree with need to discuss with Rheum OP re decreasing steroids    Chest pressure with elevated troponin  Pulmonary edema  Poorly controlled hypertension  -following cardiology recs for cath today  -asa, coreg, acei, ntp, statin    Cardiology has graciously accepted on their service. Please call with questions.     Code Status: full  Surrogate Decision Maker: wife  DVT Prophylaxis: lovenox  GI Prophylaxis: not indicated  Baseline: independent     Subjective:     Chief Complaint / Reason for Physician Visit  \"i feel ok\" Discussed with RN events overnight. Review of Systems:  Symptom Y/N Comments  Symptom Y/N Comments   Fever/Chills n   Chest Pain n    Poor Appetite n   Edema     Cough    Abdominal Pain     Sputum    Joint Pain n    SOB/OG n   Pruritis/Rash     Nausea/vomit n   Tolerating PT/OT     Diarrhea n   Tolerating Diet y    Constipation n   Other       Could NOT obtain due to:      Objective:     VITALS:   Last 24hrs VS reviewed since prior progress note.  Most recent are:  Patient Vitals for the past 24 hrs:   Temp Pulse Resp BP SpO2   17 0756 98.2 °F (36.8 °C) 83 18 162/83 96 %   17 0525 97.5 °F (36.4 °C) 98 20 155/83 97 %   17 2243 98.1 °F (36.7 °C) 81 20 145/77 95 %   17 2000 97.9 °F (36.6 °C) 87 20 138/79 98 %   17 1458 97.9 °F (36.6 °C) 79 20 151/81 97 %   17 1048 97.4 °F (36.3 °C) 78 20 164/76 97 %       Intake/Output Summary (Last 24 hours) at 17 1008  Last data filed at 17 0527   Gross per 24 hour   Intake              240 ml   Output              200 ml   Net               40 ml        PHYSICAL EXAM:  General: WD, thin bm sitting in chair, Alert, cooperative, no acute distress    EENT:  EOMI. Anicteric sclerae. MM dry  Resp:  CTA bilaterally, no wheezing or rales. No accessory muscle use  CV:  Regular  rhythm,  No edema  GI:  Soft, Non distended, Non tender.  +Bowel sounds  Neurologic:  Alert and oriented X 3, normal speech  Skin:  no rashes or ulcers. No jaundice    Reviewed most current lab test results and cultures  YES  Reviewed most current radiology test results   YES  Review and summation of old records today    NO  Reviewed patient's current orders and MAR    YES  PMH/SH reviewed - no change compared to H&P  ________________________________________________________________________  Care Plan discussed with:    Comments   Patient x Discussed with aptient in room. dsicussed cath today. Questions answered 5   Family      RN x 5   Care Manager     Consultant: x cardioogy 5                     Multidiciplinary team rounds were held today with , nursing, pharmacist and clinical coordinator. Patient's plan of care was discussed; medications were reviewed and discharge planning was addressed. ________________________________________________________________________  Total NON critical care TIME:  17  Minutes    Total CRITICAL CARE TIME Spent:   Minutes non procedure based      Comments   >50% of visit spent in counseling and coordination of care x See above   ________________________________________________________________________  Procedures: see electronic medical records for all procedures/Xrays and details which were not copied into this note but were reviewed prior to creation of Plan.       LABS:  Recent Labs      04/16/17   0408  04/15/17   1310   WBC  6.2  5.4   HGB  12.7  12.7   HCT  39.4  39.9   PLT  123*  115*     Recent Labs      04/16/17   0408  04/15/17   1310   NA  144  144   K  3.4*  4.1   CL  110*  111*   CO2  26  24   BUN  15  17   CREA  1.09  1.08   GLU  87  151*   CA  8.1*  7.7*     Recent Labs      04/15/17   1310   SGOT  23   ALT  31   AP  64 TBILI  0.5   TP  6.7   ALB  3.2*   GLOB  3.5     Recent Labs      04/15/17   1310   INR  1.1   PTP  10.8   APTT  27.7      No results for input(s): FE, TIBC, PSAT, FERR in the last 72 hours. Lab Results   Component Value Date/Time    Folate 6.7 09/21/2016 03:24 AM      No results for input(s): PH, PCO2, PO2 in the last 72 hours. No results for input(s): PHI, PO2I, PCO2I in the last 72 hours. Recent Labs      04/16/17   0408  04/15/17   1310   CKNDX   --   1.3   TROIQ  0.24*  0.28*     No results found for: CHOL, CHOLX, CHLST, CHOLV, HDL, LDL, DLDL, LDLC, DLDLP, TGL, TGLX, TRIGL, TRIGP, CHHD, CHHDX  Lab Results   Component Value Date/Time    Glucose (POC) 133 04/17/2017 07:55 AM    Glucose (POC) 155 04/16/2017 09:14 PM    Glucose (POC) 147 04/16/2017 05:11 PM    Glucose (POC) 148 04/16/2017 01:03 PM    Glucose (POC) 106 04/16/2017 08:08 AM     Lab Results   Component Value Date/Time    Color YELLOW/STRAW 04/15/2017 02:06 PM    Appearance CLEAR 04/15/2017 02:06 PM    Specific gravity 1.012 04/15/2017 02:06 PM    pH (UA) 6.0 04/15/2017 02:06 PM    Protein 100 04/15/2017 02:06 PM    Glucose NEGATIVE  04/15/2017 02:06 PM    Ketone NEGATIVE  04/15/2017 02:06 PM    Bilirubin NEGATIVE  04/15/2017 02:06 PM    Urobilinogen 0.2 04/15/2017 02:06 PM    Nitrites NEGATIVE  04/15/2017 02:06 PM    Leukocyte Esterase NEGATIVE  04/15/2017 02:06 PM    Epithelial cells FEW 04/15/2017 02:06 PM    Bacteria NEGATIVE  04/15/2017 02:06 PM    WBC 0-4 04/15/2017 02:06 PM    RBC 0-5 04/15/2017 02:06 PM       RADIOGRAPHIC STUDIES:  CXR Results  (Last 48 hours)               04/15/17 1254  XR CHEST PORT Final result    Impression:  IMPRESSION: Cardiomegaly with probable mild interstitial edema. Narrative:  EXAM:  XR CHEST PORT. INDICATION: Shortness of breath. COMPARISON: 9/28/2016. FINDINGS:    A portable AP radiograph of the chest was obtained at 1233 hours. The   positioning is lordotic. Lines and tubes:  The patient is on a cardiac monitor. Lungs: There is mild interstitial prominence throughout the lungs which could   represent interstitial edema. Pleura: There is no pneumothorax or pleural effusion. Mediastinum: The cardiac silhouette is enlarged. The aorta is minimally   atherosclerotic. Bones and soft tissues: The bones and soft tissues are grossly within normal   limits. CT Results  (Last 48 hours)    None          Echo Results  (Last 48 hours)    None          VENOUS DOPPLER results  (Last 48 hours)    None          CULTURES:    Lab Results   Component Value Date/Time    Specimen Description: KNEE FLUID 09/20/2013 10:30 AM    Specimen Description: BURSAL FLUID 09/20/2013 10:30 AM    Lab Results   Component Value Date/Time    Culture result: Culture performed on Unspun Fluid 09/29/2016 11:00 AM    Culture result: NO GROWTH 14 DAYS 09/29/2016 11:00 AM    Culture result: NO GROWTH 6 DAYS 09/28/2016 07:09 PM    Culture result: NO GROWTH 6 DAYS 09/28/2016 07:09 PM    Culture result: PSEUDOMONAS AERUGINOSA 09/24/2016 10:05 PM          Signed: Leodan Morse MD    This note will not be viewable in [address]

## 2017-04-18 VITALS
OXYGEN SATURATION: 96 % | WEIGHT: 185.5 LBS | HEART RATE: 71 BPM | RESPIRATION RATE: 14 BRPM | DIASTOLIC BLOOD PRESSURE: 91 MMHG | SYSTOLIC BLOOD PRESSURE: 156 MMHG | TEMPERATURE: 97.7 F | HEIGHT: 72 IN | BODY MASS INDEX: 25.12 KG/M2

## 2017-04-18 LAB
ANION GAP BLD CALC-SCNC: 9 MMOL/L (ref 5–15)
BUN SERPL-MCNC: 15 MG/DL (ref 6–20)
BUN/CREAT SERPL: 14 (ref 12–20)
CALCIUM SERPL-MCNC: 8.3 MG/DL (ref 8.5–10.1)
CHLORIDE SERPL-SCNC: 112 MMOL/L (ref 97–108)
CO2 SERPL-SCNC: 26 MMOL/L (ref 21–32)
CREAT SERPL-MCNC: 1.11 MG/DL (ref 0.7–1.3)
GLUCOSE BLD STRIP.AUTO-MCNC: 121 MG/DL (ref 65–100)
GLUCOSE SERPL-MCNC: 118 MG/DL (ref 65–100)
POTASSIUM SERPL-SCNC: 3.9 MMOL/L (ref 3.5–5.1)
SERVICE CMNT-IMP: ABNORMAL
SODIUM SERPL-SCNC: 147 MMOL/L (ref 136–145)

## 2017-04-18 PROCEDURE — 74011250637 HC RX REV CODE- 250/637: Performed by: INTERNAL MEDICINE

## 2017-04-18 PROCEDURE — 74011636637 HC RX REV CODE- 636/637: Performed by: INTERNAL MEDICINE

## 2017-04-18 PROCEDURE — 82962 GLUCOSE BLOOD TEST: CPT

## 2017-04-18 PROCEDURE — 36415 COLL VENOUS BLD VENIPUNCTURE: CPT | Performed by: INTERNAL MEDICINE

## 2017-04-18 PROCEDURE — 80048 BASIC METABOLIC PNL TOTAL CA: CPT | Performed by: INTERNAL MEDICINE

## 2017-04-18 RX ADMIN — CARVEDILOL 25 MG: 12.5 TABLET, FILM COATED ORAL at 08:29

## 2017-04-18 RX ADMIN — POTASSIUM CHLORIDE 10 MEQ: 750 TABLET, FILM COATED, EXTENDED RELEASE ORAL at 08:29

## 2017-04-18 RX ADMIN — Medication 10 ML: at 04:02

## 2017-04-18 RX ADMIN — LISINOPRIL 20 MG: 20 TABLET ORAL at 08:30

## 2017-04-18 RX ADMIN — PREDNISONE 7.5 MG: 5 TABLET ORAL at 08:30

## 2017-04-18 RX ADMIN — Medication 10 ML: at 00:00

## 2017-04-18 RX ADMIN — ALLOPURINOL 150 MG: 300 TABLET ORAL at 08:30

## 2017-04-18 RX ADMIN — NITROGLYCERIN 1 INCH: 20 OINTMENT TOPICAL at 04:03

## 2017-04-18 RX ADMIN — PANTOPRAZOLE SODIUM 40 MG: 40 TABLET, DELAYED RELEASE ORAL at 08:29

## 2017-04-18 RX ADMIN — ASPIRIN 81 MG CHEWABLE TABLET 81 MG: 81 TABLET CHEWABLE at 08:29

## 2017-04-18 NOTE — PROGRESS NOTES
Progress Note      4/18/2017 12:48 PM  NAME: Veronica Faustin   MRN:  [id number]   Admit Diagnosis: Chest pressure    Assessment:      1. Diuretic stopped due to gout, chronic steroid use then Dyspnea, chest pain, found to have uncontrolled hypertension, pulm edema on CXR  2. Cath 4/2017 mild CAD in right dominant, EF 30%  3. Echo 4/2017 EF 40%, mod MR  4. ECG sinus with LVH  5. HTN  6. DM  7. Dyslpidemia  8. CKD  9. Plt  On low side  10. Inflammatory arthritis on chronic steroids  11. Quit tobacco  12. BPH  13. Back surgery  14. Anemia with hx of gastritis with prior endoscopy  15. , retired from [employer], uses a cane      Plan:      Several hospitalizations over the years. Big issue has been an inflamatory arthritis, he is on chronic steroids for this. At some point recently his indapamide was stopped, since then bp has been elevated. Over last two weeks dyspnea and chest pain, ECG with no acute change, troponin equivicol, CXR with edema, not a lot of edema on exam.     Feeling better. Cath with mild CAD  Hypertensive cardiomyopathy EF 40%, mod MR     1. Cont Added aspirin  2. Changed toprol to coreg increase to 25mg bid  3. Cont lisinopril  4. Stop NTP  5. Discharge on lasix, kcl  6. Cont statin     Follow up in two weeks        [x]  High complexity decision making was performed         Subjective:     Veronica Faustin denies chest pain, dyspnea. Discussed with RN events overnight. Review of Systems:    Symptom Y/N Comments  Symptom Y/N Comments   Fever/Chills N   Chest Pain N    Poor Appetite N   Edema N    Cough N   Abdominal Pain N    Sputum N   Joint Pain N    SOB/OG N   Pruritis/Rash N    Nausea/vomit N   Tolerating PT/OT Y    Diarrhea N   Tolerating Diet Y    Constipation N   Other       Could NOT obtain due to:      Objective:      Physical Exam:    Last 24hrs VS reviewed since prior progress note.  Most recent are:    Visit Vitals    BP (!) 156/91    Pulse 71    Temp 97.7 °F (36.5 °C)    Resp 14    Ht 6' (1.829 m)    Wt 84.1 kg (185 lb 8 oz)    SpO2 96%    BMI 25.16 kg/m2       Intake/Output Summary (Last 24 hours) at 04/18/17 0754  Last data filed at 04/18/17 0709   Gross per 24 hour   Intake              360 ml   Output              900 ml   Net             -540 ml        General Appearance: Well developed, well nourished, alert & oriented x 3,    no acute distress. Ears/Nose/Mouth/Throat: Hearing grossly normal.  Neck: Supple. Chest: Lungs clear to auscultation bilaterally. Cardiovascular: Regular rate and rhythm, S1S2 normal, no murmur. Abdomen: Soft, non-tender, bowel sounds are active. Extremities: No edema bilaterally. Skin: Warm and dry. PMH/SH reviewed - no change compared to H&P    Data Review    Telemetry: normal sinus rhythm     Lab Data Personally Reviewed:    Recent Labs      04/16/17   0408  04/15/17   1310   WBC  6.2  5.4   HGB  12.7  12.7   HCT  39.4  39.9   PLT  123*  115*     Recent Labs      04/15/17   1310   INR  1.1   PTP  10.8   APTT  27.7      Recent Labs      04/18/17   0409  04/16/17   0408  04/15/17   1310   NA  147*  144  144   K  3.9  3.4*  4.1   CL  112*  110*  111*   CO2  26  26  24   BUN  15  15  17   CREA  1.11  1.09  1.08   GLU  118*  87  151*   CA  8.3*  8.1*  7.7*     Recent Labs      04/16/17   0408  04/15/17   1310   CKNDX   --   1.3   TROIQ  0.24*  0.28*     No results found for: CHOL, CHOLX, CHLST, CHOLV, HDL, LDL, DLDL, LDLC, DLDLP, TGL, TGLX, TRIGL, TRIGP, CHHD, CHHDX    Recent Labs      04/15/17   1310   SGOT  23   AP  64   TP  6.7   ALB  3.2*   GLOB  3.5     No results for input(s): PH, PCO2, PO2 in the last 72 hours.     Medications Personally Reviewed:    Current Facility-Administered Medications   Medication Dose Route Frequency    carvedilol (COREG) tablet 25 mg  25 mg Oral BID WITH MEALS    sodium chloride (NS) flush 5-10 mL  5-10 mL IntraVENous Q8H    sodium chloride (NS) flush 5-10 mL  5-10 mL IntraVENous PRN    acetaminophen (TYLENOL) tablet 650 mg  650 mg Oral Q4H PRN    naloxone (NARCAN) injection 0.4 mg  0.4 mg IntraVENous PRN    ondansetron (ZOFRAN) injection 4 mg  4 mg IntraVENous Q4H PRN    potassium chloride SR (KLOR-CON 10) tablet 10 mEq  10 mEq Oral DAILY    aspirin chewable tablet 81 mg  81 mg Oral DAILY    lisinopril (PRINIVIL, ZESTRIL) tablet 20 mg  20 mg Oral DAILY    nitroglycerin (NITROBID) 2 % ointment 1 Inch  1 Inch Topical Q6H    atorvastatin (LIPITOR) tablet 10 mg  10 mg Oral QHS    pantoprazole (PROTONIX) tablet 40 mg  40 mg Oral DAILY    allopurinol (ZYLOPRIM) tablet 150 mg  150 mg Oral DAILY    predniSONE (DELTASONE) tablet 7.5 mg  7.5 mg Oral DAILY WITH BREAKFAST    colchicine tablet 0.6 mg  0.6 mg Oral DAILY    sodium chloride (NS) flush 5-10 mL  5-10 mL IntraVENous Q8H    sodium chloride (NS) flush 5-10 mL  5-10 mL IntraVENous PRN    acetaminophen (TYLENOL) tablet 650 mg  650 mg Oral Q4H PRN    HYDROcodone-acetaminophen (NORCO) 5-325 mg per tablet 1 Tab  1 Tab Oral Q4H PRN    ondansetron (ZOFRAN) injection 4 mg  4 mg IntraVENous Q4H PRN    docusate sodium (COLACE) capsule 100 mg  100 mg Oral BID PRN    glucose chewable tablet 16 g  4 Tab Oral PRN    dextrose (D50W) injection syrg 12.5-25 g  12.5-25 g IntraVENous PRN    glucagon (GLUCAGEN) injection 1 mg  1 mg IntraMUSCular PRN    enoxaparin (LOVENOX) injection 40 mg  40 mg SubCUTAneous Q24H    insulin lispro (HUMALOG) injection   SubCUTAneous AC&HS    labetalol (NORMODYNE;TRANDATE) injection 20 mg  20 mg IntraVENous Q6H PRN         Diego Lewis MD

## 2017-04-18 NOTE — PROCEDURES
[place] [facility], [address]   CORONARY ANGIOGRAPHY       Name:  Karen Read   MR#:  [id number]   :  1945   Account #:  [de-identified]        Date of Adm:  04/15/2017       CINE NUMBER: . PROCEDURE PERFORMED   1. Left heart cardiac catheterization. 2. Left ventricular angiography. 3. Selective coronary angiography. 4. Sedation. Sedation was administered by cath lab staff and I  supervised them as   they monitored the patient for the duration of the procedure. DURATION: 17 minutes. SEDATION: Versed and fentanyl. INDICATION: New onset congestive heart failure. ESTIMATED BLOOD LOSS: Less than 50 mL. SPECIMENS REMOVED: None. COMPLICATIONS: None. RESULTS   1. LEFT HEART CARDIAC CATHETERIZATION: Arterial pressure of   147/80 with a mean of 95. Left ventricular pressure of 140/10. COMMENTS: Left ventricular end-diastolic pressures within normal   limits. There is no aortic stenosis. Systemic arterial pressures are   mildly elevated. 2. LEFT VENTRICULAR ANGIOGRAPHY IN THE HERRON   PROJECTION: The left ventricle is mildly dilated with moderately   decreased left ventricular function with an estimated ejection fraction of   30% with 1+ mitral regurgitation. SELECTIVE CORONARY ANGIOGRAPHY   LEFT MAIN: The left main is normal in caliber with mild luminal   irregularities but no significant obstruction. LEFT ANTERIOR DESCENDING: The left anterior descending is   normal in caliber with mild luminal irregularities, but no significant   obstruction. LEFT CIRCUMFLEX: The left circumflex is normal in caliber with mild   luminal irregularities, but no significant obstruction. RIGHT CORONARY ARTERY: The right coronary artery is dominant. The right coronary artery is normal in caliber with mild luminal   irregularities, but no significant obstruction.       At the completion of the procedure, hemostasis was obtained via a TR   band. CONCLUSION   1. Mild nonobstructive coronary artery disease with moderately   decreased left ventricular function with an estimated ejection fraction of   30%. 2. Normal left ventricular filling pressures. No aortic stenosis. Systemic   arterial pressures are mildly elevated. 3. Moderately decreased left ventricular function with an estimated   ejection fraction of 30% and no significant mitral regurgitation. 4. Mild nonobstructive coronary artery disease in a right dominant   system. 5. Continue medical management of nonischemic, likely hypertensive   cardiomyopathy.         Chacho Galicia MD SR / [place]   D:  04/17/2017   13:08   T:  04/18/2017   09:42   Job #:  [id number]

## 2017-04-18 NOTE — DISCHARGE SUMMARY
Cardiology Discharge Summary             Patient ID:  Heriberto Steele  [id number]  86 y.o.  1945    Admit Date: 4/15/2017     Discharge Date: 4/18/2017   Admitting Physician: Kennedy Severino MD   Discharge Physician: West Brown MD       Assessment:       1. Diuretic stopped due to gout, chronic steroid use then Dyspnea, chest pain, found to have uncontrolled hypertension, pulm edema on CXR  2. Cath 4/2017 mild CAD in right dominant, EF 30%  3. Echo 4/2017 EF 40%, mod MR  4. ECG sinus with LVH  5. HTN  6. DM  7. Dyslpidemia  8. CKD  9. Plt On low side  10. Inflammatory arthritis on chronic steroids  11. Quit tobacco  12. BPH  13. Back surgery  14. Anemia with hx of gastritis with prior endoscopy  15. , retired from [employer], uses a cane       Plan:       Several hospitalizations over the years. Big issue has been an inflamatory arthritis, he is on chronic steroids for this. At some point recently his indapamide was stopped, since then bp has been elevated. Over last two weeks dyspnea and chest pain, ECG with no acute change, troponin equivicol, CXR with edema, not a lot of edema on exam.      Feeling better. Cath with mild CAD  Hypertensive cardiomyopathy EF 40%, mod MR      1. Cont Added aspirin  2. Changed toprol to coreg increase to 25mg bid  3. Cont lisinopril  4. Stop NTP  5. Discharge on lasix, kcl  6. Cont statin      Follow up in two weeks          [x] High complexity decision making was performed          Consults:  [facility] Course and Discharge Exam:    On the day of discharge, ambulatory and taking oral.  All questions answered. Aware of signs and symptoms warranting urgent medical follow-up or calling 911. Visit Vitals    BP (!) 156/91    Pulse 71    Temp 97.7 °F (36.5 °C)    Resp 14    Ht 6' (1.829 m)    Wt 84.1 kg (185 lb 8 oz)    SpO2 96%    BMI 25.16 kg/m2       Physical Exam:  Nondiaphoretic, not in acute distress.   Unlabored, clear to auscultation bilaterally. Regular rate and rhythm, no murmur, rub, or gallop. No JVD or peripheral edema. Palpable radial pulses bilaterally. Groin site(s) OK. No cyanosis. Skin warm and dry. Awake, appropriate, neuro grossly nonfocal.  Ambulatory. Lab Results   Component Value Date/Time    WBC 6.2 04/16/2017 04:08 AM    HGB 12.7 04/16/2017 04:08 AM    HCT 39.4 04/16/2017 04:08 AM    PLATELET 799 43/74/4014 04:08 AM    MCV 95.9 04/16/2017 04:08 AM     Lab Results   Component Value Date/Time    Sodium 147 04/18/2017 04:09 AM    Potassium 3.9 04/18/2017 04:09 AM    Chloride 112 04/18/2017 04:09 AM    CO2 26 04/18/2017 04:09 AM    Anion gap 9 04/18/2017 04:09 AM    Glucose 118 04/18/2017 04:09 AM    BUN 15 04/18/2017 04:09 AM    Creatinine 1.11 04/18/2017 04:09 AM    BUN/Creatinine ratio 14 04/18/2017 04:09 AM    GFR est AA >60 04/18/2017 04:09 AM    GFR est non-AA >60 04/18/2017 04:09 AM    Calcium 8.3 04/18/2017 04:09 AM     Lab Results   Component Value Date/Time    CK 85 09/28/2016 07:09 PM    CK - MB 5.8 04/15/2017 01:10 PM    CK-MB Index 1.3 04/15/2017 01:10 PM    Troponin-I, Qt. 0.24 04/16/2017 04:08 AM     04/16/2017 04:08 AM     Lab Results   Component Value Date/Time    TSH 1.68 09/17/2016 04:20 AM     No results found for: CHOL, CHOLPOCT, CHOLX, CHLST, CHOLV, HDL, HDLPOC, LDL, LDLCPOC, NLDLCT, DLDL, LDLC, DLDLP, VLDLC, VLDL, TGL, TGLX, TRIGL, [id number], TRIGP, TGLPOCT, CHHD, CHHDX  Lab Results   Component Value Date/Time    Hemoglobin A1c 7.5 09/17/2016 04:20 AM             Disposition: home    Patient Instructions:   Current Discharge Medication List      START taking these medications    Details   aspirin 81 mg chewable tablet Take 1 Tab by mouth daily. Qty: 30 Tab, Refills: 11      carvedilol (COREG) 25 mg tablet Take 1 Tab by mouth two (2) times daily (with meals). Qty: 30 Tab, Refills: 11      potassium chloride SR (KLOR-CON 10) 10 mEq tablet Take 1 Tab by mouth daily.   Qty: 30 Tab, Refills: 11 furosemide (LASIX) 20 mg tablet Take 1 Tab by mouth daily. Qty: 30 Tab, Refills: 11         CONTINUE these medications which have NOT CHANGED    Details   cholecalciferol (VITAMIN D3) 1,000 unit cap Take  by mouth daily. predniSONE (DELTASONE) 5 mg tablet Take 2 tabs po qam with breakfast  Qty: 60 Tab, Refills: 3    Associated Diagnoses: Polyarticular gout      allopurinol (ZYLOPRIM) 100 mg tablet Take 1 and 1/2 tabs po daily. THIS IS A NEW AND HIGHER DOSE. Qty: 45 Tab, Refills: 3    Associated Diagnoses: Polyarticular gout      colchicine 0.6 mg tablet Take 1 tab po daily or as directed  Qty: 30 Tab, Refills: 3    Associated Diagnoses: Polyarticular gout      lisinopril (PRINIVIL, ZESTRIL) 20 mg tablet Take  by mouth daily. glipiZIDE SR (GLUCOTROL XL) 5 mg CR tablet Take 5 mg by mouth two (2) times a day. pantoprazole (PROTONIX) 40 mg tablet Take 40 mg by mouth daily. atorvastatin (LIPITOR) 10 mg tablet TAKE 1 TABLET BY MOUTH ONCE A DAY  Refills: 2      tamsulosin (FLOMAX) 0.4 mg capsule Take 0.4 mg by mouth daily. traMADol (ULTRAM) 50 mg tablet Take 50 mg by mouth every six (6) hours as needed for Pain. STOP taking these medications       metoprolol succinate (TOPROL-XL) 50 mg XL tablet Comments:   Reason for Stopping:               FOLLOW-UP:       Call the office [phone] to make an appointment for 2 weeks. [address] [place] [address], [address]    [phone]  [address]    [email or web address]    Thank you for placing your trust for your heart with the physicians and staff of [facility]. We have long provided [place] with the most advanced cardiovascular care possible using a personalized and caring approach. And we hope to continue this strong tradition well into the future. A heart condition, or the fear of having a heart condition, can be a stressful time for a patient and their family.  There are appointments, testing procedures and unfamiliar terms that can cause natural questions and concerns. While we encourage you to speak with your nurse or physician regarding any questions you might have, please consider this web site as a powerful resource you can use at any time. Often, questions or concerns only arise once you've left our office. There are many useful sections on this web site and links to other professional organizations and advocacy groups. These sources can help answer many questions you might have from the comfort of your own home or office. Again, thank you for considering [facility] as your trusted provider of heart care. Please don't hesitate to let us know if there is anything we can do to enhance your experience with us.      Signed:  Sue Rose MD  4/18/2017

## 2017-04-18 NOTE — CARDIO/PULMONARY
C/P Rehab. Note:      Trinity Urban is a 66 y.o. male with PMHx of HTN, and DM presenting ambulatory to [address] [place] [address] with chest pain. He reports additional symptom of dry cough. Pt notes that he saw his PCP when his symptoms initially started, and he was given Mucinex and an inhaler. He has been using the medications, and was seeing improvement in his symptoms. However symptoms have worsened. He reports that his chest tightness is exacerbated with exertion and relieved by rest. Pt notes that his chest tightness episodes last ~10-15 minutes at a time. He reports that he had a cardiac catheterization done ~10 years ago, and did not get a stent placed. Pt denies nausea, vomiting, diaphoresis, leg swelling, hx of heart issues, hx of COPD, or hx of asthma.      PMH includes:  Secondary iridocyclitis, noninfectious ICD-10-CM: H20.049  ICD-9-CM: 364.04   8/26/2011      Spinal stenosis, lumbar region, with neurogenic claudication ICD-10-CM: M48.06  ICD-9-CM: 724.03   3/24/2015     GI bleed ICD-10-CM: K92.2  ICD-9-CM: 578.9   3/22/2016     Bilateral leg and foot pain ICD-10-CM: M79.604, M79.605, M79.671, M79.672  ICD-9-CM: 729.5   9/21/2016     KAITLIN (acute kidney injury) [facility]) ICD-10-CM: N17.9  ICD-9-CM: 584.9   9/28/2016     Chronic gouty arthropathy with tophi         EF = 40% on Echo of 4/17/17. Cardiac cath done with no intervention needed. Chart reviewed and pt visited. Printed material given and discussed re: heart healthy habits, the cardiac diet, medication management, and post cardiac catheterization instructions. Discussed post catheterization restrictions, including:no manipulating the wrist for 24 hours, no lifting for 7 days, no soaking the wrist for 3 days . Also discussed what to do if bleeding or bruising at the cath insertion site is observed. Reviewed the cardiac diet (low NA/fat/CHOL), the importance of medication compliance, monitoring for any unusual signs & symptoms and when to call the doctor. Smoking history was assessed. The pt verbalized understanding.

## 2017-04-18 NOTE — PROGRESS NOTES
Patient oob and ambulating with steady gait. No complaints, radial site no bleeding or hematoma. Dc paperwork reviewed with patient. Aware of fu apts, medications including side effects.  Verbalized unerstanding with no further questions

## 2017-05-24 ENCOUNTER — TELEPHONE (OUTPATIENT)
Dept: RHEUMATOLOGY | Age: 72
End: 2017-05-24

## 2017-05-24 ENCOUNTER — OFFICE VISIT (OUTPATIENT)
Dept: RHEUMATOLOGY | Age: 72
End: 2017-05-24

## 2017-05-24 VITALS
SYSTOLIC BLOOD PRESSURE: 148 MMHG | RESPIRATION RATE: 16 BRPM | OXYGEN SATURATION: 97 % | HEART RATE: 75 BPM | BODY MASS INDEX: 26.01 KG/M2 | DIASTOLIC BLOOD PRESSURE: 82 MMHG | WEIGHT: 192 LBS | HEIGHT: 72 IN | TEMPERATURE: 97.9 F

## 2017-05-24 DIAGNOSIS — Z79.899 LONG-TERM USE OF HIGH-RISK MEDICATION: ICD-10-CM

## 2017-05-24 DIAGNOSIS — N18.3 CHRONIC KIDNEY DISEASE (CKD), STAGE 3 (MODERATE): ICD-10-CM

## 2017-05-24 DIAGNOSIS — Z79.52 LONG TERM (CURRENT) USE OF SYSTEMIC STEROIDS: ICD-10-CM

## 2017-05-24 DIAGNOSIS — M1A.00X1 CHRONIC GOUTY ARTHROPATHY WITH TOPHI: Primary | ICD-10-CM

## 2017-05-24 DIAGNOSIS — M10.9 POLYARTICULAR GOUT: ICD-10-CM

## 2017-05-24 DIAGNOSIS — D69.6 THROMBOCYTOPENIA (HCC): ICD-10-CM

## 2017-05-24 RX ORDER — COLCHICINE 0.6 MG/1
0.6 CAPSULE ORAL DAILY
Qty: 30 CAP | Refills: 3 | Status: ON HOLD | OUTPATIENT
Start: 2017-05-24 | End: 2018-06-05

## 2017-05-24 RX ORDER — PREDNISONE 1 MG/1
TABLET ORAL
Qty: 150 TAB | Refills: 2 | Status: SHIPPED | OUTPATIENT
Start: 2017-05-24 | End: 2018-02-08 | Stop reason: ALTCHOICE

## 2017-05-24 RX ORDER — ALLOPURINOL 100 MG/1
TABLET ORAL
Qty: 60 TAB | Refills: 3 | Status: SHIPPED | OUTPATIENT
Start: 2017-05-24 | End: 2017-08-24 | Stop reason: SDUPTHER

## 2017-05-24 RX ORDER — TALC
POWDER (GRAM) TOPICAL
Refills: 2 | COMMUNITY
Start: 2017-04-21

## 2017-05-24 RX ORDER — ASPIRIN 81 MG/1
TABLET ORAL
Refills: 3 | Status: ON HOLD | COMMUNITY
Start: 2017-05-08 | End: 2018-06-05

## 2017-05-24 NOTE — TELEPHONE ENCOUNTER
Prescription Changed to Mitigare 0.6 mg caps #30 Take 1 cap daily. Patient has to meet his deductible so cost is $ 170.00. Pharmacy will call the patient.

## 2017-05-24 NOTE — MR AVS SNAPSHOT
Visit Information Date & Time Provider Department Dept. Phone Encounter #  
 5/24/2017 10:30 AM Susan Bucio MD [facility] [id number] Follow-up Instructions Return in about 3 months (around 8/24/2017). Upcoming Health Maintenance Date Due  
 LIPID PANEL Q1 1945 FOOT EXAM Q1 5/27/1955 MICROALBUMIN Q1 5/27/1954 EYE EXAM RETINAL OR DILATED Q1 5/27/1954 FOBT Q 1 YEAR AGE 50-75 5/27/1994 ZOSTER VACCINE AGE 60> 5/27/2004 GLAUCOMA SCREENING Q2Y 5/27/2009 Pneumococcal 65+ High/Highest Risk (1 of 2 - PCV13) 5/27/2009 MEDICARE YEARLY EXAM 5/27/2009 DTaP/Tdap/Td series (1 - Tdap) 3/8/2017 HEMOGLOBIN A1C Q6M 3/17/2017 INFLUENZA AGE 9 TO ADULT 8/1/2017 Allergies as of 5/24/2017  Review Complete On: 5/24/2017 By: Susan Bucio MD  
 No Known Allergies Current Immunizations  Reviewed on 5/24/2017 Name Date  
 TD Vaccine 8/26/2011  8:04 PM  
 Td, Adsorbed 3/7/2017 12:47 PM  
  
 Reviewed by Travis Braxton LPN on 9/03/7825 at 99:17 AM  
You Were Diagnosed With   
  
 Codes Comments Chronic gouty arthropathy with tophi    -  Primary ICD-10-CM: M1A.00X1 ICD-9-CM: 274.03 Long term (current) use of systemic steroids     ICD-10-CM: Z79.52 
ICD-9-CM: V58.65 Thrombocytopenia ([facility] [address]     ICD-10-CM: D69.6 ICD-9-CM: 287.5 Long-term use of high-risk medication     ICD-10-CM: Z79.899 ICD-9-CM: V58.69 Chronic kidney disease (CKD), stage 3 (moderate)     ICD-10-CM: N18.3 ICD-9-CM: 716. 3 Polyarticular gout     ICD-10-CM: M10.00 ICD-9-CM: 274.00 Vitals BP Pulse Temp Resp Height(growth percentile) Weight(growth percentile) 148/82 (BP 1 Location: Right arm, BP Patient Position: Sitting) 75 97.9 °F (36.6 °C) (Oral) 16 6' (1.829 m) 192 lb (87.1 kg) SpO2 BMI Smoking Status 97% 26.04 kg/m2 Former Smoker BMI and BSA Data Body Mass Index Body Surface Area 26.04 kg/m 2 2.1 m 2 Preferred Pharmacy Pharmacy Name Phone [facility], [address] [facility] AT [address] [phone] Your Updated Medication List  
  
   
This list is accurate as of: 5/24/17 11:01 AM.  Always use your most recent med list.  
  
  
  
  
 allopurinol 100 mg tablet Commonly known as:  ZYLOPRIM  
150 mg once daily or as directed * aspirin 81 mg chewable tablet Take 1 Tab by mouth daily. * aspirin delayed-release 81 mg tablet TAKE 1 TABLET BY MOUTH EVERY DAY  
  
 atorvastatin 10 mg tablet Commonly known as:  LIPITOR  
TAKE 1 TABLET BY MOUTH ONCE A DAY  
  
 carvedilol 25 mg tablet Commonly known as:  Bailey Mamou Take 1 Tab by mouth two (2) times daily (with meals). cholecalciferol 1,000 unit Cap Commonly known as:  VITAMIN D3 Take  by mouth daily. colchicine 0.6 mg capsule Commonly known as:  Jess Marking Take 1 Cap by mouth daily. If diarrhea, stop and lower to 0.6 mg every other day  
  
 furosemide 20 mg tablet Commonly known as:  LASIX Take 1 Tab by mouth daily. glipiZIDE SR 5 mg CR tablet Commonly known as:  GLUCOTROL XL Take 5 mg by mouth two (2) times a day. lisinopril 20 mg tablet Commonly known as:  Joann Rosalio Take  by mouth daily. magnesium oxide 250 mg tablet TAKE 1 TABLET BY MOUTH AS NEEDED ONCE A DAY ORALLY 30 DAY(S)  
  
 pantoprazole 40 mg tablet Commonly known as:  PROTONIX Take 40 mg by mouth daily. potassium chloride SR 10 mEq tablet Commonly known as:  KLOR-CON 10 Take 1 Tab by mouth daily. * predniSONE 5 mg tablet Commonly known as:  Larrie Doom Take 2 tabs po qam with breakfast  
  
 * predniSONE 1 mg tablet Commonly known as:  Larrie Doom Instead of 5 mg tab, take 5 mg once daily. Taper by 1 mg every 2 weeks if tolerated. tamsulosin 0.4 mg capsule Commonly known as:  FLOMAX Take 0.4 mg by mouth daily. * Notice: This list has 4 medication(s) that are the same as other medications prescribed for you. Read the directions carefully, and ask your doctor or other care provider to review them with you. Prescriptions Sent to Pharmacy Refills  
 allopurinol (ZYLOPRIM) 100 mg tablet 3 Si mg once daily or as directed Class: Normal  
 Pharmacy: [facility] [address] [address] AT [address] [place] [address] Drive Ph #: [phone]  
 colchicine (MITIGARE) 0.6 mg capsule 3 Sig: Take 1 Cap by mouth daily. If diarrhea, stop and lower to 0.6 mg every other day Class: Normal  
 Pharmacy: [facility] [address] AT [address] [place] [address] P Ph #: [phone] Route: Oral  
 predniSONE (DELTASONE) 1 mg tablet 2 Sig: Instead of 5 mg tab, take 5 mg once daily. Taper by 1 mg every 2 weeks if tolerated. Class: Normal  
 Pharmacy: [facility] [address] AT [address] [place] [address] P Ph #: [phone] We Performed the Following CBC WITH AUTOMATED DIFF [93869 CPT(R)] METABOLIC PANEL, COMPREHENSIVE [04780 CPT(R)] URIC ACID [id number] CPT(R)] Follow-up Instructions Return in about 3 months (around 2017). Patient Instructions Continue current allopurinol and colchicine for now Labs today If diarrhea, stop colchicine and call for lower dose In 2 weeks, prednisone to 4 mg daily. Lower by 1 mg every 2 weeks if tolerated. Prednisone medical alert bracelet for the next year. Introducing [facility]! [employer] introduces [employer] patient portal. Now you can access parts of your medical record, email your doctor's office, and request medication refills online. 1. In your internet browser, go to [email or web address] 2. Click on the First Time User? Click Here link in the Sign In box. You will see the New Member Sign Up page. 3. Enter your [employer] Access Code exactly as it appears below. You will not need to use this code after youve completed the sign-up process. If you do not sign up before the expiration date, you must request a new code. · [employer] Access Code: [email or web address] Expires: 6/19/2017  5:21 PM 
 
4. Enter the last four digits of your Social Security Number (xxxx) and Date of Birth (mm/dd/yyyy) as indicated and click Submit. You will be taken to the next sign-up page. 5. Create a [employer] ID. This will be your [employer] login ID and cannot be changed, so think of one that is secure and easy to remember. 6. Create a [employer] password. You can change your password at any time. 7. Enter your Password Reset Question and Answer. This can be used at a later time if you forget your password. 8. Enter your e-mail address. You will receive e-mail notification when new information is available in [address] 9. Click Sign Up. You can now view and download portions of your medical record. 10. Click the Download Summary menu link to download a portable copy of your medical information. If you have questions, please visit the Frequently Asked Questions section of the [employer] website. Remember, [employer] is NOT to be used for urgent needs. For medical emergencies, dial 911. Now available from your iPhone and Android! Please provide this summary of care documentation to your next provider. Your primary care clinician is listed as Malika Reason. If you have any questions after today's visit, please call [phone].

## 2017-05-24 NOTE — PROGRESS NOTES
HISTORY OF PRESENT ILLNESS  Harris Whitehead is a 66 y.o. male. HPI Patient presents for follow up of gout. \"I'm doing a whole lot better. \" He was hospitalized last month for chest pain and dyspnea: he is feeling improved from this standpoint. He has not had any gout flares since last visit. He has mild, intermittent, wrist pain. He has no joint swelling. He is taking prednisone 5 mg daily (second week at this dose; previously 10 mg daily). He is taking allopurinol 150 mg daily and colchicine 0.6 mg daily. He is tolerating the medications without difficulty. Current Outpatient Prescriptions   Medication Sig Dispense Refill    aspirin delayed-release 81 mg tablet TAKE 1 TABLET BY MOUTH EVERY DAY  3    magnesium oxide 250 mg tablet TAKE 1 TABLET BY MOUTH AS NEEDED ONCE A DAY ORALLY 30 DAY(S)  2    aspirin 81 mg chewable tablet Take 1 Tab by mouth daily. 30 Tab 11    carvedilol (COREG) 25 mg tablet Take 1 Tab by mouth two (2) times daily (with meals). 30 Tab 11    potassium chloride SR (KLOR-CON 10) 10 mEq tablet Take 1 Tab by mouth daily. 30 Tab 11    furosemide (LASIX) 20 mg tablet Take 1 Tab by mouth daily. 30 Tab 11    cholecalciferol (VITAMIN D3) 1,000 unit cap Take  by mouth daily.  predniSONE (DELTASONE) 5 mg tablet Take 2 tabs po qam with breakfast (Patient taking differently: 5 mg daily. Take 2 tabs po qam with breakfast) 60 Tab 3    allopurinol (ZYLOPRIM) 100 mg tablet Take 1 and 1/2 tabs po daily. THIS IS A NEW AND HIGHER DOSE. 45 Tab 3    colchicine 0.6 mg tablet Take 1 tab po daily or as directed 30 Tab 3    lisinopril (PRINIVIL, ZESTRIL) 20 mg tablet Take  by mouth daily.  glipiZIDE SR (GLUCOTROL XL) 5 mg CR tablet Take 5 mg by mouth two (2) times a day.  pantoprazole (PROTONIX) 40 mg tablet Take 40 mg by mouth daily.  atorvastatin (LIPITOR) 10 mg tablet TAKE 1 TABLET BY MOUTH ONCE A DAY  2    tamsulosin (FLOMAX) 0.4 mg capsule Take 0.4 mg by mouth daily.        No Known Allergies    Review of Systems   Constitutional: Negative for fever. Cardiovascular: Negative for leg swelling. Gastrointestinal: Negative for abdominal pain. Skin: Negative for rash. Physical Exam   Vitals reviewed. Constitutional: He is oriented to person, place, and time. He appears well-developed and well-nourished. No distress. HENT:   Mouth/Throat: Oropharynx is clear and moist. No oropharyngeal exudate. No areas exposed jaw bone  Neck: Normal range of motion. Neck supple. Cardiovascular: RR  No edema   Pulmonary/Chest: Effort normal. Lungs clear  Abdominal: Soft. He exhibits no distension. There is no tenderness. No organomegaly   Musculoskeletal:   -left knee flexion to 100 degrees. Rest of peripheral joints FROM  -Full peripheral joint examination reveals no joint tenderness or synovitis. Lymphadenopathy:   He has no cervical adenopathy. Neurological: He is alert and oriented to person, place, and time. He exhibits normal muscle tone. Skin: Skin is warm and dry.   -few tiny right olecranon nodules  Psychiatric: He has a normal mood and affect. Thought content normal.     Lab Results   Component Value Date/Time    WBC 6.2 04/16/2017 04:08 AM    HGB 12.7 04/16/2017 04:08 AM    HCT 39.4 04/16/2017 04:08 AM    PLATELET 091 32/52/8241 04:08 AM    MCV 95.9 04/16/2017 04:08 AM     Lab Results   Component Value Date/Time    Sodium 147 04/18/2017 04:09 AM    Potassium 3.9 04/18/2017 04:09 AM    Chloride 112 04/18/2017 04:09 AM    CO2 26 04/18/2017 04:09 AM    Anion gap 9 04/18/2017 04:09 AM    Glucose 118 04/18/2017 04:09 AM    BUN 15 04/18/2017 04:09 AM    Creatinine 1.11 04/18/2017 04:09 AM    BUN/Creatinine ratio 14 04/18/2017 04:09 AM    GFR est AA >60 04/18/2017 04:09 AM    GFR est non-AA >60 04/18/2017 04:09 AM    Calcium 8.3 04/18/2017 04:09 AM    Bilirubin, total 0.5 04/15/2017 01:10 PM    AST (SGOT) 23 04/15/2017 01:10 PM    Alk.  phosphatase 64 04/15/2017 01:10 PM    Protein, total 6.7 04/15/2017 01:10 PM    Albumin 3.2 04/15/2017 01:10 PM    Globulin 3.5 04/15/2017 01:10 PM    A-G Ratio 0.9 04/15/2017 01:10 PM    ALT (SGPT) 31 04/15/2017 01:10 PM     Lab Results   Component Value Date/Time    Uric acid 4.8 01/19/2017 02:09 PM         ASSESSMENT and PLAN    ICD-10-CM ICD-9-CM    1. Chronic gouty arthropathy with tophi: He is taking colchicine 0.6 mg every day and allopurinol 150 mg daily. No difficulty tolerating medications. Now down to 5 mg daily prednisone. No recent flare. Uric acid 4.8 last check (highest value on record 7.3). M1A.00X1 274.03 URIC ACID  -if uric acid remains at goal, continue allopurinol 150 mg daily  -colchicine 0.6 mg daily (new RX carvedilol; if any diarrhea, stop and let me know). -prednisone 5 mg once daily. Taper by 1 mg every 2 weeks if tolerated   2. Long term (current) use of systemic steroids: recent DXA normal Z79.52 V58.65 -calcium 1200 mg daily through healthy diet  -vitamin D3 7977-1197 units daily   3. Thrombocytopenia ([facility] [address]): noted at recent hospitalization. D69.6 287.5 CBC WITH AUTOMATED DIFF   4. Long-term use of high-risk medication E63.585 J64.32 METABOLIC PANEL, COMPREHENSIVE      CBC WITH AUTOMATED DIFF   5.  Chronic kidney disease (CKD), stage 3 (moderate): stable over past 2 years S59.3 200.1 METABOLIC PANEL, COMPREHENSIVE

## 2017-05-24 NOTE — PATIENT INSTRUCTIONS
Continue current allopurinol and colchicine for now    Labs today    If diarrhea, stop colchicine and call for lower dose    In 2 weeks, prednisone to 4 mg daily. Lower by 1 mg every 2 weeks if tolerated. Prednisone medical alert bracelet for the next year.

## 2017-05-25 ENCOUNTER — TELEPHONE (OUTPATIENT)
Dept: RHEUMATOLOGY | Age: 72
End: 2017-05-25

## 2017-05-25 LAB
ALBUMIN SERPL-MCNC: 4.3 G/DL (ref 3.5–4.8)
ALBUMIN/GLOB SERPL: 1.8 {RATIO} (ref 1.2–2.2)
ALP SERPL-CCNC: 59 IU/L (ref 39–117)
ALT SERPL-CCNC: 21 IU/L (ref 0–44)
AST SERPL-CCNC: 26 IU/L (ref 0–40)
BASOPHILS # BLD AUTO: 0 X10E3/UL (ref 0–0.2)
BASOPHILS NFR BLD AUTO: 0 %
BILIRUB SERPL-MCNC: 0.5 MG/DL (ref 0–1.2)
BUN SERPL-MCNC: 23 MG/DL (ref 8–27)
BUN/CREAT SERPL: 18 (ref 10–24)
CALCIUM SERPL-MCNC: 9.3 MG/DL (ref 8.6–10.2)
CHLORIDE SERPL-SCNC: 104 MMOL/L (ref 96–106)
CO2 SERPL-SCNC: 26 MMOL/L (ref 18–29)
CREAT SERPL-MCNC: 1.28 MG/DL (ref 0.76–1.27)
EOSINOPHIL # BLD AUTO: 0 X10E3/UL (ref 0–0.4)
EOSINOPHIL NFR BLD AUTO: 1 %
ERYTHROCYTE [DISTWIDTH] IN BLOOD BY AUTOMATED COUNT: 15.3 % (ref 12.3–15.4)
GLOBULIN SER CALC-MCNC: 2.4 G/DL (ref 1.5–4.5)
GLUCOSE SERPL-MCNC: 119 MG/DL (ref 65–99)
HCT VFR BLD AUTO: 41.1 % (ref 37.5–51)
HGB BLD-MCNC: 13 G/DL (ref 12.6–17.7)
IMM GRANULOCYTES # BLD: 0 X10E3/UL (ref 0–0.1)
IMM GRANULOCYTES NFR BLD: 0 %
LYMPHOCYTES # BLD AUTO: 0.9 X10E3/UL (ref 0.7–3.1)
LYMPHOCYTES NFR BLD AUTO: 20 %
MCH RBC QN AUTO: 30.8 PG (ref 26.6–33)
MCHC RBC AUTO-ENTMCNC: 31.6 G/DL (ref 31.5–35.7)
MCV RBC AUTO: 97 FL (ref 79–97)
MONOCYTES # BLD AUTO: 0.2 X10E3/UL (ref 0.1–0.9)
MONOCYTES NFR BLD AUTO: 5 %
NEUTROPHILS # BLD AUTO: 3.3 X10E3/UL (ref 1.4–7)
NEUTROPHILS NFR BLD AUTO: 74 %
PLATELET # BLD AUTO: 162 X10E3/UL (ref 150–379)
POTASSIUM SERPL-SCNC: 5 MMOL/L (ref 3.5–5.2)
PROT SERPL-MCNC: 6.7 G/DL (ref 6–8.5)
RBC # BLD AUTO: 4.22 X10E6/UL (ref 4.14–5.8)
SODIUM SERPL-SCNC: 146 MMOL/L (ref 134–144)
URATE SERPL-MCNC: 5.7 MG/DL (ref 3.7–8.6)
WBC # BLD AUTO: 4.4 X10E3/UL (ref 3.4–10.8)

## 2017-05-25 NOTE — TELEPHONE ENCOUNTER
Patient informed Prednisone 1 mg has been approved by [employer] from 4/25/2017-5/24/2020, case id# [id number]. Also informed Tamanna Antunez has a deductible that has to be met, and the tier cannot be lowered. Patient states he has been using a copay card, and the cost is $82.00.  Instructed to check with the pharmacist.

## 2017-05-25 NOTE — PROGRESS NOTES
Please forward labs to PCP. Uric acid at target but slightly higher than last check. No change in allopurinol at this time. Glucose 119. Kidney function slightly lower than last check but overall stable range. Sodium slightly elevated. As long as he continues to feel well, I would obtain BMP in 4 weeks (drink 8 ounces of water before lab).

## 2017-05-26 ENCOUNTER — TELEPHONE (OUTPATIENT)
Dept: RHEUMATOLOGY | Age: 72
End: 2017-05-26

## 2017-05-26 DIAGNOSIS — M1A.00X1 CHRONIC GOUTY ARTHROPATHY WITH TOPHI: Primary | ICD-10-CM

## 2017-05-26 NOTE — TELEPHONE ENCOUNTER
R'cvd call Pradeep who asked for clarification on the dosage for Mr. Cathleen Titus Allopurinol. Pharmacy stated that the patient said Dr. Josselyn Reddy made a dosage increase. Need to clarify the dosage on his Allopurinol.

## 2017-05-26 NOTE — PROGRESS NOTES
Called and spoke with the patient. Informed them of the results. The patient verbalized understanding of the results. Labs forwarded to pcp.

## 2017-05-26 NOTE — TELEPHONE ENCOUNTER
Called [facility] and gave clarification that the pt is to be taking Allopurinol 150mg daily.  Understanding of this was verbalized by the pharmacist.

## 2017-08-24 ENCOUNTER — OFFICE VISIT (OUTPATIENT)
Dept: RHEUMATOLOGY | Age: 72
End: 2017-08-24

## 2017-08-24 VITALS
BODY MASS INDEX: 26.68 KG/M2 | DIASTOLIC BLOOD PRESSURE: 81 MMHG | SYSTOLIC BLOOD PRESSURE: 165 MMHG | RESPIRATION RATE: 20 BRPM | TEMPERATURE: 97.7 F | HEIGHT: 72 IN | HEART RATE: 69 BPM | OXYGEN SATURATION: 96 % | WEIGHT: 197 LBS

## 2017-08-24 DIAGNOSIS — Z79.899 LONG-TERM USE OF HIGH-RISK MEDICATION: ICD-10-CM

## 2017-08-24 DIAGNOSIS — M1A.00X1 CHRONIC GOUTY ARTHROPATHY WITH TOPHI: Primary | ICD-10-CM

## 2017-08-24 DIAGNOSIS — I10 ESSENTIAL HYPERTENSION: ICD-10-CM

## 2017-08-24 DIAGNOSIS — Z79.52 LONG TERM (CURRENT) USE OF SYSTEMIC STEROIDS: ICD-10-CM

## 2017-08-24 DIAGNOSIS — N18.3 CHRONIC KIDNEY DISEASE (CKD), STAGE 3 (MODERATE): ICD-10-CM

## 2017-08-24 NOTE — LETTER
8/28/2017 9:41 AM 
 
Mr. Meryle Margarita [address] [address] Dear Meryle Margarita: 
 
Please find your most recent results below. Resulted Orders METABOLIC PANEL, COMPREHENSIVE Result Value Ref Range Glucose 72 65 - 99 mg/dL BUN 20 8 - 27 mg/dL Creatinine 1.10 0.76 - 1.27 mg/dL GFR est non-AA 66 >59 mL/min/1.73 GFR est AA 77 >59 mL/min/1.73  
 BUN/Creatinine ratio 18 10 - 24 Sodium 145 (H) 134 - 144 mmol/L Potassium 4.5 3.5 - 5.2 mmol/L Chloride 105 96 - 106 mmol/L  
 CO2 25 18 - 29 mmol/L Calcium 9.2 8.6 - 10.2 mg/dL Protein, total 6.8 6.0 - 8.5 g/dL Albumin 4.1 3.5 - 4.8 g/dL GLOBULIN, TOTAL 2.7 1.5 - 4.5 g/dL A-G Ratio 1.5 1.2 - 2.2 Bilirubin, total 0.4 0.0 - 1.2 mg/dL Alk. phosphatase 74 39 - 117 IU/L  
 AST (SGOT) 18 0 - 40 IU/L  
 ALT (SGPT) 13 0 - 44 IU/L Narrative Performed at:  [address]  [id number] : Mauricio Francis MD, Phone:  [phone] CBC WITH AUTOMATED DIFF Result Value Ref Range WBC 4.8 3.4 - 10.8 x10E3/uL  
 RBC 4.06 (L) 4.14 - 5.80 x10E6/uL HGB 12.8 12.6 - 17.7 g/dL HCT 39.8 37.5 - 51.0 % MCV 98 (H) 79 - 97 fL  
 MCH 31.5 26.6 - 33.0 pg  
 MCHC 32.2 31.5 - 35.7 g/dL  
 RDW 13.7 12.3 - 15.4 % PLATELET 151 (L) 626 - 379 x10E3/uL NEUTROPHILS 59 % Lymphocytes 29 % MONOCYTES 8 % EOSINOPHILS 4 % BASOPHILS 0 %  
 ABS. NEUTROPHILS 2.8 1.4 - 7.0 x10E3/uL Abs Lymphocytes 1.4 0.7 - 3.1 x10E3/uL  
 ABS. MONOCYTES 0.4 0.1 - 0.9 x10E3/uL  
 ABS. EOSINOPHILS 0.2 0.0 - 0.4 x10E3/uL  
 ABS. BASOPHILS 0.0 0.0 - 0.2 x10E3/uL IMMATURE GRANULOCYTES 0 %  
 ABS. IMM. GRANS. 0.0 0.0 - 0.1 x10E3/uL Narrative Performed at:  [address]  [id number] : Mauricio Francis MD, Phone:  [phone] URIC ACID Result Value Ref Range Uric acid 5.5 3.7 - 8.6 mg/dL Comment:  
              Therapeutic target for gout patients: <6.0 Narrative Performed at:  [address]  [id number] : Maisha Friedman MD, Phone:  [phone] Please call me if you have any questions: [phone] Sincerely, Pee Lugo MD

## 2017-08-24 NOTE — PROGRESS NOTES
HISTORY OF PRESENT ILLNESS  Mateo Pat is a 67 y.o. male. HPI Patient presents for follow up of gout. He has not had any gout flares since last visit. He has no joint pain. He has no joint swelling. He is taking prednisone, having lowered to 1 mg daily about one month ago. He is taking allopurinol 150 mg daily and colchicine 0.6 mg daily. He has not had any diarrhea. He is taking calcium and vitamin D daily. Current Outpatient Prescriptions   Medication Sig Dispense Refill    allopurinol (ZYLOPRIM) 100 mg tablet Take 1 and 1/2 tab po daily. 135 Tab 1    aspirin delayed-release 81 mg tablet TAKE 1 TABLET BY MOUTH EVERY DAY  3    magnesium oxide 250 mg tablet TAKE 1 TABLET BY MOUTH AS NEEDED ONCE A DAY ORALLY 30 DAY(S)  2    colchicine (MITIGARE) 0.6 mg capsule Take 1 Cap by mouth daily. If diarrhea, stop and lower to 0.6 mg every other day 30 Cap 3    predniSONE (DELTASONE) 1 mg tablet Instead of 5 mg tab, take 5 mg once daily. Taper by 1 mg every 2 weeks if tolerated. 150 Tab 2    aspirin 81 mg chewable tablet Take 1 Tab by mouth daily. 30 Tab 11    carvedilol (COREG) 25 mg tablet Take 1 Tab by mouth two (2) times daily (with meals). 30 Tab 11    potassium chloride SR (KLOR-CON 10) 10 mEq tablet Take 1 Tab by mouth daily. 30 Tab 11    furosemide (LASIX) 20 mg tablet Take 1 Tab by mouth daily. 30 Tab 11    cholecalciferol (VITAMIN D3) 1,000 unit cap Take  by mouth daily.  lisinopril (PRINIVIL, ZESTRIL) 20 mg tablet Take  by mouth daily.  glipiZIDE SR (GLUCOTROL XL) 5 mg CR tablet Take 5 mg by mouth two (2) times a day.  pantoprazole (PROTONIX) 40 mg tablet Take 40 mg by mouth daily.  atorvastatin (LIPITOR) 10 mg tablet TAKE 1 TABLET BY MOUTH ONCE A DAY  2    tamsulosin (FLOMAX) 0.4 mg capsule Take 0.4 mg by mouth daily. No Known Allergies  Review of Systems   Constitutional: Negative for fever. Cardiovascular: Negative for leg swelling.    Gastrointestinal: Negative for abdominal pain and nausea. Skin: Negative for rash. Physical Exam   Vitals reviewed. Constitutional: He is oriented to person, place, and time. He appears well-developed and well-nourished. No distress. HENT:   Mouth/Throat: Oropharynx is clear and moist. No oropharyngeal exudate. No areas exposed jaw bone  Neck: Normal range of motion. Neck supple. Cardiovascular: RR  Trace edema   Pulmonary/Chest: Effort normal. Lungs clear  Abdominal: Soft. He exhibits no distension. There is no tenderness. No organomegaly   Musculoskeletal:   -left knee flexion to 90 degrees. Rest of peripheral joints FROM  -Full peripheral joint examination reveals no joint tenderness or synovitis. Lymphadenopathy:   He has no cervical adenopathy. Neurological: He is alert and oriented to person, place, and time. He exhibits normal muscle tone. Skin: Skin is warm and dry. -one tiny, movable, right olecranon nodule; soft, small, non-tender, nodule volar aspect right 5th finger, proximal to PIP  Psychiatric: He has a normal mood and affect. Thought content normal.     Lab Results   Component Value Date/Time    Uric acid 5.7 05/24/2017 11:09 AM     Lab Results   Component Value Date/Time    WBC 4.4 05/24/2017 11:09 AM    HGB 13.0 05/24/2017 11:09 AM    HCT 41.1 05/24/2017 11:09 AM    PLATELET 179 59/92/3174 11:09 AM    MCV 97 05/24/2017 11:09 AM     Lab Results   Component Value Date/Time    Sodium 146 05/24/2017 11:09 AM    Potassium 5.0 05/24/2017 11:09 AM    Chloride 104 05/24/2017 11:09 AM    CO2 26 05/24/2017 11:09 AM    Anion gap 9 04/18/2017 04:09 AM    Glucose 119 05/24/2017 11:09 AM    BUN 23 05/24/2017 11:09 AM    Creatinine 1.28 05/24/2017 11:09 AM    BUN/Creatinine ratio 18 05/24/2017 11:09 AM    GFR est AA 64 05/24/2017 11:09 AM    GFR est non-AA 56 05/24/2017 11:09 AM    Calcium 9.3 05/24/2017 11:09 AM    Bilirubin, total 0.5 05/24/2017 11:09 AM    AST (SGOT) 26 05/24/2017 11:09 AM    Alk.  phosphatase 59 05/24/2017 11:09 AM    Protein, total 6.7 05/24/2017 11:09 AM    Albumin 4.3 05/24/2017 11:09 AM    Globulin 3.5 04/15/2017 01:10 PM    A-G Ratio 1.8 05/24/2017 11:09 AM    ALT (SGPT) 21 05/24/2017 11:09 AM         ASSESSMENT and PLAN    ICD-10-CM ICD-9-CM    1. Chronic gouty arthropathy with tophi: He is taking colchicine 0.6 mg every day and allopurinol 150 mg daily. No difficulty tolerating medications. Now down to 1 mg daily prednisone. No recent flare. Uric acid 5.7 last check (highest value on record 7.3). M1A.00X1 274.03 URIC ACID  Gout friendly diet  Allopurinol 150 mg daily  Prednisone 1 mg every other day for 2 weeks, then stop. After that, if doing well, lower colchicine to 0.6 mg (one tablet) once every other day       2. Long term (current) use of systemic steroids: DXA normal. Z79.52 V58.65 -calcium 1200 mg daily total  -vitamin D3 2769-4595 units daily   3. Chronic kidney disease (CKD), stage 3 (moderate) V05.1 628.3 METABOLIC PANEL, COMPREHENSIVE   4. Long-term use of high-risk medication D29.597 V39.13 METABOLIC PANEL, COMPREHENSIVE      CBC WITH AUTOMATED DIFF   5. Hypertension: no focal symptoms. Repeat systolic 493.  Monitor at home and with PCP/cardiology if remains elevated

## 2017-08-24 NOTE — PATIENT INSTRUCTIONS
Prednisone 1 mg every other day for 2 weeks, then stop.     After that, if doing well, lower colchicine to 0.6 mg (one tablet) once every other day    Continue allopurinol 150 mg once daily

## 2017-08-24 NOTE — MR AVS SNAPSHOT
Visit Information Date & Time Provider Department Dept. Phone Encounter #  
 8/24/2017 10:00 AM Leyda Hurtado MD [address] [facility] [id number] Follow-up Instructions Return in about 6 months (around 2/24/2018). Upcoming Health Maintenance Date Due FOBT Q 1 YEAR AGE 50-75 5/27/1994 ZOSTER VACCINE AGE 60> 3/27/2004 GLAUCOMA SCREENING Q2Y 5/27/2009 Pneumococcal 65+ High/Highest Risk (1 of 2 - PCV13) 5/27/2009 MEDICARE YEARLY EXAM 5/27/2009 DTaP/Tdap/Td series (1 - Tdap) 3/8/2017 INFLUENZA AGE 9 TO ADULT 8/1/2017 Allergies as of 8/24/2017  Review Complete On: 8/24/2017 By: Leyda Hurtado MD  
 No Known Allergies Current Immunizations  Reviewed on 5/24/2017 Name Date  
 TD Vaccine 8/26/2011  8:04 PM  
 Td, Adsorbed 3/7/2017 12:47 PM  
  
 Not reviewed this visit You Were Diagnosed With   
  
 Codes Comments Chronic gouty arthropathy with tophi    -  Primary ICD-10-CM: M1A.00X1 ICD-9-CM: 274.03 Long term (current) use of systemic steroids     ICD-10-CM: Z79.52 
ICD-9-CM: V58.65 Chronic kidney disease (CKD), stage 3 (moderate)     ICD-10-CM: N18.3 ICD-9-CM: 333. 3 Long-term use of high-risk medication     ICD-10-CM: Z79.899 ICD-9-CM: V58.69 Vitals BP Pulse Temp Resp Height(growth percentile) Weight(growth percentile) 165/81 69 97.7 °F (36.5 °C) (Oral) 20 6' (1.829 m) 197 lb (89.4 kg) SpO2 BMI Smoking Status 96% 26.72 kg/m2 Former Smoker Vitals History BMI and BSA Data Body Mass Index Body Surface Area  
 26.72 kg/m 2 2.13 m 2 Preferred Pharmacy Pharmacy Name Phone [facility] [phone] Your Updated Medication List  
  
   
This list is accurate as of: 8/24/17 10:42 AM.  Always use your most recent med list.  
  
  
  
  
 allopurinol 100 mg tablet Commonly known as:  Jared Coronelilder Take 1 and 1/2 tab po daily. * aspirin 81 mg chewable tablet Take 1 Tab by mouth daily. * aspirin delayed-release 81 mg tablet TAKE 1 TABLET BY MOUTH EVERY DAY  
  
 atorvastatin 10 mg tablet Commonly known as:  LIPITOR  
TAKE 1 TABLET BY MOUTH ONCE A DAY  
  
 carvedilol 25 mg tablet Commonly known as:  Deleta Alberto Take 1 Tab by mouth two (2) times daily (with meals). cholecalciferol 1,000 unit Cap Commonly known as:  VITAMIN D3 Take  by mouth daily. colchicine 0.6 mg capsule Commonly known as:  Marybelle Daya Take 1 Cap by mouth daily. If diarrhea, stop and lower to 0.6 mg every other day  
  
 furosemide 20 mg tablet Commonly known as:  LASIX Take 1 Tab by mouth daily. glipiZIDE SR 5 mg CR tablet Commonly known as:  GLUCOTROL XL Take 5 mg by mouth two (2) times a day. lisinopril 20 mg tablet Commonly known as:  Therisa Semen Take  by mouth daily. magnesium oxide 250 mg tablet TAKE 1 TABLET BY MOUTH AS NEEDED ONCE A DAY ORALLY 30 DAY(S)  
  
 pantoprazole 40 mg tablet Commonly known as:  PROTONIX Take 40 mg by mouth daily. potassium chloride SR 10 mEq tablet Commonly known as:  KLOR-CON 10 Take 1 Tab by mouth daily. predniSONE 1 mg tablet Commonly known as:  Vamsi Res Instead of 5 mg tab, take 5 mg once daily. Taper by 1 mg every 2 weeks if tolerated. tamsulosin 0.4 mg capsule Commonly known as:  FLOMAX Take 0.4 mg by mouth daily. * Notice: This list has 2 medication(s) that are the same as other medications prescribed for you. Read the directions carefully, and ask your doctor or other care provider to review them with you. We Performed the Following CBC WITH AUTOMATED DIFF [62668 CPT(R)] METABOLIC PANEL, COMPREHENSIVE [88233 CPT(R)] URIC ACID [id number] CPT(R)] Follow-up Instructions Return in about 6 months (around 2/24/2018). Patient Instructions Prednisone 1 mg every other day for 2 weeks, then stop. After that, if doing well, lower colchicine to 0.6 mg (one tablet) once every other day Continue allopurinol 150 mg once daily Introducing [facility] SERVICES! Erlinda Muñoz introduces [employer] patient portal. Now you can access parts of your medical record, email your doctor's office, and request medication refills online. 1. In your internet browser, go to [email or web address] 2. Click on the First Time User? Click Here link in the Sign In box. You will see the New Member Sign Up page. 3. Enter your [employer] Access Code exactly as it appears below. You will not need to use this code after youve completed the sign-up process. If you do not sign up before the expiration date, you must request a new code. · [employer] Access Code: [email or web address] Expires: 9/30/2017  7:56 PM 
 
4. Enter the last four digits of your Social Security Number (xxxx) and Date of Birth (mm/dd/yyyy) as indicated and click Submit. You will be taken to the next sign-up page. 5. Create a [employer] ID. This will be your [employer] login ID and cannot be changed, so think of one that is secure and easy to remember. 6. Create a [employer] password. You can change your password at any time. 7. Enter your Password Reset Question and Answer. This can be used at a later time if you forget your password. 8. Enter your e-mail address. You will receive e-mail notification when new information is available in [address] 9. Click Sign Up. You can now view and download portions of your medical record. 10. Click the Download Summary menu link to download a portable copy of your medical information. If you have questions, please visit the Frequently Asked Questions section of the [employer] website. Remember, [employer] is NOT to be used for urgent needs. For medical emergencies, dial 911. Now available from your iPhone and Android! Please provide this summary of care documentation to your next provider. Your primary care clinician is listed as Dayne Bernard. If you have any questions after today's visit, please call [phone].

## 2017-08-24 NOTE — PROGRESS NOTES
Visit Vitals    /74    Pulse 69    Temp 97.7 °F (36.5 °C) (Oral)    Resp 20    Ht 6' (1.829 m)    Wt 197 lb (89.4 kg)    SpO2 96%    BMI 26.72 kg/m2     Chief Complaint   Patient presents with    Gout     improving, no flares up

## 2017-08-25 LAB
ALBUMIN SERPL-MCNC: 4.1 G/DL (ref 3.5–4.8)
ALBUMIN/GLOB SERPL: 1.5 {RATIO} (ref 1.2–2.2)
ALP SERPL-CCNC: 74 IU/L (ref 39–117)
ALT SERPL-CCNC: 13 IU/L (ref 0–44)
AST SERPL-CCNC: 18 IU/L (ref 0–40)
BASOPHILS # BLD AUTO: 0 X10E3/UL (ref 0–0.2)
BASOPHILS NFR BLD AUTO: 0 %
BILIRUB SERPL-MCNC: 0.4 MG/DL (ref 0–1.2)
BUN SERPL-MCNC: 20 MG/DL (ref 8–27)
BUN/CREAT SERPL: 18 (ref 10–24)
CALCIUM SERPL-MCNC: 9.2 MG/DL (ref 8.6–10.2)
CHLORIDE SERPL-SCNC: 105 MMOL/L (ref 96–106)
CO2 SERPL-SCNC: 25 MMOL/L (ref 18–29)
CREAT SERPL-MCNC: 1.1 MG/DL (ref 0.76–1.27)
EOSINOPHIL # BLD AUTO: 0.2 X10E3/UL (ref 0–0.4)
EOSINOPHIL NFR BLD AUTO: 4 %
ERYTHROCYTE [DISTWIDTH] IN BLOOD BY AUTOMATED COUNT: 13.7 % (ref 12.3–15.4)
GLOBULIN SER CALC-MCNC: 2.7 G/DL (ref 1.5–4.5)
GLUCOSE SERPL-MCNC: 72 MG/DL (ref 65–99)
HCT VFR BLD AUTO: 39.8 % (ref 37.5–51)
HGB BLD-MCNC: 12.8 G/DL (ref 12.6–17.7)
IMM GRANULOCYTES # BLD: 0 X10E3/UL (ref 0–0.1)
IMM GRANULOCYTES NFR BLD: 0 %
LYMPHOCYTES # BLD AUTO: 1.4 X10E3/UL (ref 0.7–3.1)
LYMPHOCYTES NFR BLD AUTO: 29 %
MCH RBC QN AUTO: 31.5 PG (ref 26.6–33)
MCHC RBC AUTO-ENTMCNC: 32.2 G/DL (ref 31.5–35.7)
MCV RBC AUTO: 98 FL (ref 79–97)
MONOCYTES # BLD AUTO: 0.4 X10E3/UL (ref 0.1–0.9)
MONOCYTES NFR BLD AUTO: 8 %
NEUTROPHILS # BLD AUTO: 2.8 X10E3/UL (ref 1.4–7)
NEUTROPHILS NFR BLD AUTO: 59 %
PLATELET # BLD AUTO: 142 X10E3/UL (ref 150–379)
POTASSIUM SERPL-SCNC: 4.5 MMOL/L (ref 3.5–5.2)
PROT SERPL-MCNC: 6.8 G/DL (ref 6–8.5)
RBC # BLD AUTO: 4.06 X10E6/UL (ref 4.14–5.8)
SODIUM SERPL-SCNC: 145 MMOL/L (ref 134–144)
URATE SERPL-MCNC: 5.5 MG/DL (ref 3.7–8.6)
WBC # BLD AUTO: 4.8 X10E3/UL (ref 3.4–10.8)

## 2017-08-25 NOTE — PROGRESS NOTES
Please forward labs to PCP. Mild, stable low blood platelets (review and monitor with PCP). Mildly elevated red blood cell size: please add B12/folate tests (dx macrocytosis without anemia; thrombocytopenia). Uric acid at goal. Continue gout plan as discussed.

## 2017-08-28 NOTE — PROGRESS NOTES
Called [facility], added B12 and folate test. Copy of results forwarded to PCP. Attempted to contact pt regarding lab results, unable to LVM.

## 2017-08-31 LAB
FOLATE SERPL-MCNC: 20 NG/ML
SPECIMEN STATUS REPORT, ROLRST: NORMAL
VIT B12 SERPL-MCNC: 1010 PG/ML (ref 211–946)

## 2017-09-01 NOTE — PROGRESS NOTES
Called and spoke to pt, informed of the following; Mild, stable low blood platelets (review and monitor with PCP). Mildly elevated red blood cell size. Uric acid at goal. Continue gout plan as discussed, per Dr. Arielle Zhang. Pt verbalized understanding. Voiced no concerns.

## 2017-12-04 ENCOUNTER — TELEPHONE (OUTPATIENT)
Dept: RHEUMATOLOGY | Age: 72
End: 2017-12-04

## 2017-12-04 NOTE — TELEPHONE ENCOUNTER
Called patient and scheduled him for an appointment tomorrow at 3pm with Dr Edi Leslie. Patient verbalized understanding.

## 2017-12-04 NOTE — TELEPHONE ENCOUNTER
Returned patient's call he has been experiencing increased pain & swelling in his hands. He thinks he is having a gout flare, it's been going on the last week. Currently he is taking Prednisone 1 mg daily, Allopurinol 150 mg daily and Colchicine 0.6 mg daily. [facility]

## 2017-12-05 ENCOUNTER — OFFICE VISIT (OUTPATIENT)
Dept: RHEUMATOLOGY | Age: 72
End: 2017-12-05

## 2017-12-05 VITALS
WEIGHT: 196 LBS | SYSTOLIC BLOOD PRESSURE: 146 MMHG | BODY MASS INDEX: 29.03 KG/M2 | RESPIRATION RATE: 14 BRPM | OXYGEN SATURATION: 95 % | TEMPERATURE: 97.9 F | HEIGHT: 69 IN | HEART RATE: 81 BPM | DIASTOLIC BLOOD PRESSURE: 76 MMHG

## 2017-12-05 DIAGNOSIS — M1A.00X1 CHRONIC GOUTY ARTHROPATHY WITH TOPHI: Primary | ICD-10-CM

## 2017-12-05 RX ORDER — PREDNISONE 5 MG/1
TABLET ORAL
Qty: 30 TAB | Refills: 1 | Status: SHIPPED | OUTPATIENT
Start: 2017-12-05 | End: 2018-02-08 | Stop reason: ALTCHOICE

## 2017-12-05 RX ORDER — ALLOPURINOL 300 MG/1
TABLET ORAL
Qty: 90 TAB | Refills: 6 | Status: SHIPPED | OUTPATIENT
Start: 2017-12-05 | End: 2018-02-08 | Stop reason: ALTCHOICE

## 2017-12-05 RX ORDER — ALLOPURINOL 300 MG/1
300 TABLET ORAL DAILY
Qty: 30 TAB | Refills: 6 | Status: SHIPPED | OUTPATIENT
Start: 2017-12-05 | End: 2017-12-05 | Stop reason: SDUPTHER

## 2017-12-05 RX ORDER — AMLODIPINE BESYLATE 5 MG/1
TABLET ORAL
Refills: 3 | COMMUNITY
Start: 2017-11-21

## 2017-12-05 NOTE — PATIENT INSTRUCTIONS
Allopurinol 300mg - take one tablet daily  Prednisone taper - as directed  Colchicine - stop (too expensive)

## 2017-12-05 NOTE — PROGRESS NOTES
CHIEF COMPLAINT  The patient was sent for rheumatology consultation by Dr. Anupama Benjamin MD for evaluation of joint pain. HISTORY OF PRESENT ILLNESS  This is a 67 y.o.  male. Today, the patient complains of pain in the joints. Location: hand  Severity:  7 on a scale of 0-10  Timing: all day   Duration:  3 years  Context/Associated signs and symptoms: The patient was diagnosed with gout 3 years ago. He was admitted for his first flare and states it affected all of his joints. He does not recall a diagnostic aspiration. Previous records show crystal-proven gout (9/2016). Today, he states he is currently flaring and complains of pain in his hands. He continues with Allopurinol 150 mg, Colchicine, and Prednisone 1 mg daily. His most recent uric acid (8/4/2017) was 5.5. I discussed that this dose of medication is suboptimal and that with appropriate meds we can manage his gout to stop future flares. He states he has 3 flares a year.      RHEUMATOLOGY REVIEW OF SYSTEMS   Positives as per HPI  Negatives as follows:  Torsten Nam:  Denies unexplained persistent fevers, weight change, chronic fatigue  HEAD/EYES:   Denies eye redness, blurry vision or sudden loss of vision, dry eyes, HA, temporal artery pain  ENT:    Denies oral/nasal ulcers, recurrent sinus infections, dry mouth  RESPIRATORY:  No pleuritic pain, history of pleural effusions, hemoptysis, exertional dyspnea  CARDIOVASCULAR:  Denies chest pain, history of pericardial effusions  GASTRO:   Denies heartburn, esophageal dysmotility, abdominal pain, nausea, vomiting, diarrhea, blood in the stool  HEMATOLOGIC:  No easy bruising, purpura, swollen lymph nodes  SKIN:    Denies alopecia, ulcers, nodules, sun sensitivity, unexplained persistent rash   VASCULAR:   Denies edema, cyanosis, raynaud phenomenon  NEUROLOGIC:  Denies specific muscle weakness, paresthesias   PSYCHIATRIC:  No sleep disturbance / snoring, depression, anxiety  MSK:    No morning stiffness >1 hour, SI joint pain    MEDICAL AND SOCIAL HISTORY  This was reviewed with the patient and reviewed in the medical records. Past Medical History:   Diagnosis Date    Arthritis     Chronic back pain     Diabetes ([address]     Dyslipidemia     Gout     Hypertension     Left wrist fracture     Renal cancer [facility])      Past Surgical History:   Procedure Laterality Date    COLONOSCOPY,DIAGNOSTIC  3/24/2016         COLONOSCOPY,REMV LESN,SNARE  3/24/2016         HX ORTHOPAEDIC      Back and right knee surgery    HX ORTHOPAEDIC      carpal tunnel bilateral    RENAL SCOPE,W/RESECTION,TUMOR      UPPER GI ENDOSCOPY,BIOPSY  3/23/2016          Social History   Substance Use Topics    Smoking status: Former Smoker     Packs/day: 1.00     Years: 45.00     Quit date: 3/12/2005    Smokeless tobacco: Never Used    Alcohol use No      Comment: occasional     Employment - Not working  Sleep - Good, no issues  Exercise - yes    FAMILY HISTORY   No autoimmune disease in the family    MEDICATIONS  All the current medications were reviewed in detail. PHYSICAL EXAM  Blood pressure 146/76, pulse 81, temperature 97.9 °F (36.6 °C), temperature source Oral, resp. rate 14, height 5' 9\" (1.753 m), weight 196 lb (88.9 kg), SpO2 95 %. GENERAL APPEARANCE: Well-nourished adult in no acute distress. EYES: No scleral erythema, conjunctival injection. ENT: No oral ulcer, parotid enlargement. NECK: No adenopathy, thyroid enlargement. CARDIOVASCULAR: Heart rhythm is regular. No murmur, rub, gallop. CHEST: Normal vesicular breath sounds. No wheezes, rales, pleural friction rubs. ABDOMINAL: The abdomen is soft and nontender. Liver and spleen are nonpalpable. Bowel sounds are normal.  EXTREMITIES: There is no evidence of clubbing, cyanosis, edema. SKIN: No rash, palpable purpura, digital ulcer, abnormal thickening.   NEUROLOGICAL: Normal gait and station, full strength in upper and lower extremities, normal sensation to light touch. MUSCULOSKELETAL:   Upper extremities - Warmth of Left 2nd-3rd PIPs, DIPs, MCPs, Left wrist. Tenderness of 3rd-5th digits. Lower extremities - full range of motion, no tenderness, no swelling, no synovial thickening and no deformity of joints. OA changes noted    LABS, RADIOLOGY AND PROCEDURES  Previous labs reviewed -Yes  Previous radiology reviewed -Yes  Previous procedures reviewed -Yes  Previous medical records reviewed/summarized -Yes    ASSESSMENT  1. Gout - crystal-proven (9/2016), diagnosed 3 years ago (New problem - Progressive disease) - The patient was diagnosed with gout 3 years ago and has a consistent history. His most recent uric acid (8/4/2017) was 5.5. I explained to him that his current medications are not optimal for this condition. I will increase his allopurinol and to 300 mg daily. Additionally, I want him to take prednisone 20 mg daily q3 days, then taper by 5 mg q3 days. He should stop colchicine once we have a good uric acid number and only use colchicine for flares. However the colchicine is too expensive for him. I will check labs and order x-rays. He should return in 1 month for a follow up. PLAN  1. Increase Allopurinol to 300 mg daily   2. Prednisone 20 mg daily; taper as directed  3. Stop Colchicine  4. X-rays of hands, feet, knees to look for inflammatory/erosive changes   5. Check CBC, CMP, markers of inflammation (ESR, CRP)  6. CLARIBEL, CRP, ESR, RF-CCP  7. Return in 1 month    Linh Woods MD  Adult and Pediatric Rheumatology     [facility] and [address] [facility], [address] [facility], Phone [phone], Fax [phone]   E-mail: [email or web address]    Visiting  of Pediatrics    Department of Pediatrics, [address], [address] [place] [address], Phone [phone], Fax [phone]  E-mail: [email or web address]    Patient Instructions   Allopurinol 300mg - take one tablet daily  Prednisone taper - as directed  Colchicine - stop (too expensive)       cc: Alfredo Lott MD    Written by sky Lucas, as dictated by Kim Nichole. Alexandr Perla M.D. Total face-to face time was 40 minutes, greater than 50% of which was spent in counseling and coordination of care. The diagnosis, treatment and various other items were discussed in detail: Test results, medication options, possible side effects, lifestyle changes.

## 2017-12-05 NOTE — MR AVS SNAPSHOT
Visit Information Date & Time Provider Department Dept. Phone Encounter #  
 12/5/2017  3:00 PM Carol Ortiz MD [address] [phone] [id number] Follow-up Instructions Return in about 1 month (around 1/5/2018). Upcoming Health Maintenance Date Due FOBT Q 1 YEAR AGE 50-75 5/27/1994 ZOSTER VACCINE AGE 60> 3/27/2004 GLAUCOMA SCREENING Q2Y 5/27/2009 Pneumococcal 65+ High/Highest Risk (1 of 2 - PCV13) 5/27/2009 MEDICARE YEARLY EXAM 5/27/2009 DTaP/Tdap/Td series (1 - Tdap) 3/8/2017 Influenza Age 5 to Adult 8/1/2017 Allergies as of 12/5/2017  Review Complete On: 12/5/2017 By: Ibrahima Carmichael LPN No Known Allergies Current Immunizations  Reviewed on 5/24/2017 Name Date  
 TD Vaccine 8/26/2011  8:04 PM  
 Td, Adsorbed 3/7/2017 12:47 PM  
  
 Not reviewed this visit You Were Diagnosed With   
  
 Codes Comments Chronic gouty arthropathy with tophi    -  Primary ICD-10-CM: M1A.00X1 ICD-9-CM: 274.03 Vitals BP Pulse Temp Resp Height(growth percentile) Weight(growth percentile) 146/76 (BP 1 Location: Left arm, BP Patient Position: Sitting) 81 97.9 °F (36.6 °C) (Oral) 14 5' 9\" (1.753 m) 196 lb (88.9 kg) SpO2 BMI Smoking Status 95% 28.94 kg/m2 Former Smoker BMI and BSA Data Body Mass Index Body Surface Area  
 28.94 kg/m 2 2.08 m 2 Preferred Pharmacy Pharmacy Name Phone [facility], [address] [place] [address] [facility] AT [address] [phone] Your Updated Medication List  
  
   
This list is accurate as of: 12/5/17  3:43 PM.  Always use your most recent med list.  
  
  
  
  
 * allopurinol 100 mg tablet Commonly known as:  Ananya Negron Take 1 and 1/2 tab po daily. * allopurinol 300 mg tablet Commonly known as:  Ananya Negron Take 1 Tab by mouth daily. amLODIPine 5 mg tablet Commonly known as:  Eliel Alstrom TAKE 1 TABLET BY MOUTH EVERY MORNING  
  
 * aspirin 81 mg chewable tablet Take 1 Tab by mouth daily. * aspirin delayed-release 81 mg tablet TAKE 1 TABLET BY MOUTH EVERY DAY  
  
 atorvastatin 10 mg tablet Commonly known as:  LIPITOR  
TAKE 1 TABLET BY MOUTH ONCE A DAY  
  
 carvedilol 25 mg tablet Commonly known as:  Jestine Pellet Take 1 Tab by mouth two (2) times daily (with meals). cholecalciferol 1,000 unit Cap Commonly known as:  VITAMIN D3 Take  by mouth daily. colchicine 0.6 mg capsule Commonly known as:  Staci Trinity Take 1 Cap by mouth daily. If diarrhea, stop and lower to 0.6 mg every other day  
  
 furosemide 20 mg tablet Commonly known as:  LASIX Take 1 Tab by mouth daily. glipiZIDE SR 5 mg CR tablet Commonly known as:  GLUCOTROL XL Take 5 mg by mouth two (2) times a day. lisinopril 20 mg tablet Commonly known as:  Alona Loss Take  by mouth daily. magnesium oxide 250 mg tablet TAKE 1 TABLET BY MOUTH AS NEEDED ONCE A DAY ORALLY 30 DAY(S)  
  
 pantoprazole 40 mg tablet Commonly known as:  PROTONIX Take 40 mg by mouth daily. potassium chloride SR 10 mEq tablet Commonly known as:  KLOR-CON 10 Take 1 Tab by mouth daily. * predniSONE 1 mg tablet Commonly known as:  Berle Pouch Instead of 5 mg tab, take 5 mg once daily. Taper by 1 mg every 2 weeks if tolerated. * predniSONE 5 mg tablet Commonly known as:  DELTASONE  
20mg x 3 days, 15mg x 3 days, 10mg x 3 days, 5mg x 3 days  
  
 tamsulosin 0.4 mg capsule Commonly known as:  FLOMAX Take 0.4 mg by mouth daily. * Notice: This list has 6 medication(s) that are the same as other medications prescribed for you. Read the directions carefully, and ask your doctor or other care provider to review them with you. Prescriptions Sent to Pharmacy  Refills  
 predniSONE (DELTASONE) 5 mg tablet 1  
 Simg x 3 days, 15mg x 3 days, 10mg x 3 days, 5mg x 3 days Class: Normal  
 Pharmacy: [facility] [address] AT [address] [facility] [address] Unit Ph #: [phone]  
 allopurinol (ZYLOPRIM) 300 mg tablet 6 Sig: Take 1 Tab by mouth daily. Class: Normal  
 Pharmacy: [facility] [address] Ph #: [phone] Route: Oral  
  
We Performed the Following ANTINUCLEAR ANTIBODIES, IFA [id number] CPT(R)] C REACTIVE PROTEIN, QT [54798 CPT(R)] CBC+PLATELET+HEM REVIEW [34751 CPT(R)] [address], IGG [id number] CPT(R)] METABOLIC PANEL, COMPREHENSIVE [58338 CPT(R)] RHEUMATOID FACTOR, QL [id number] CPT(R)] SED RATE (ESR) [id number] CPT(R)] URIC ACID [id number] CPT(R)] Follow-up Instructions Return in about 1 month (around 2018). To-Do List   
 2017 Imaging:  XR FOOT LT MIN 3 V   
  
 2017 Imaging:  XR FOOT RT MIN 3 V   
  
 2017 Imaging:  XR HAND LT MIN 3 V   
  
 2017 Imaging:  XR HAND RT MIN 3 V   
  
 2017 Imaging:  XR KNEE LT MAX 2 VWS   
  
 2017 Imaging:  XR KNEE RT MAX 2 VWS Patient Instructions Allopurinol 300mg - take one tablet daily Prednisone taper - as directed Colchicine - stop (too expensive) Introducing hospitals & HEALTH SERVICES! Ruby Barrera introduces Intelipost patient portal. Now you can access parts of your medical record, email your doctor's office, and request medication refills online. 1. In your internet browser, go to [email or web address] 2. Click on the First Time User? Click Here link in the Sign In box. You will see the New Member Sign Up page. 3. Enter your Intelipost Access Code exactly as it appears below. You will not need to use this code after youve completed the sign-up process.  If you do not sign up before the expiration date, you must request a new code. · Metrekare Access Code: [email or web address] Expires: 3/5/2018  3:43 PM 
 
4. Enter the last four digits of your Social Security Number (xxxx) and Date of Birth (mm/dd/yyyy) as indicated and click Submit. You will be taken to the next sign-up page. 5. Create a Metrekare ID. This will be your Metrekare login ID and cannot be changed, so think of one that is secure and easy to remember. 6. Create a Metrekare password. You can change your password at any time. 7. Enter your Password Reset Question and Answer. This can be used at a later time if you forget your password. 8. Enter your e-mail address. You will receive e-mail notification when new information is available in [address] 9. Click Sign Up. You can now view and download portions of your medical record. 10. Click the Download Summary menu link to download a portable copy of your medical information. If you have questions, please visit the Frequently Asked Questions section of the Metrekare website. Remember, Metrekare is NOT to be used for urgent needs. For medical emergencies, dial 911. Now available from your iPhone and Android! Please provide this summary of care documentation to your next provider. Your primary care clinician is listed as Kamla Zhang. If you have any questions after today's visit, please call [phone].

## 2017-12-07 LAB
ALBUMIN SERPL-MCNC: 4.4 G/DL (ref 3.5–4.8)
ALBUMIN/GLOB SERPL: 1.8 {RATIO} (ref 1.2–2.2)
ALP SERPL-CCNC: 75 IU/L (ref 39–117)
ALT SERPL-CCNC: 22 IU/L (ref 0–44)
ANA TITR SER IF: NEGATIVE {TITER}
AST SERPL-CCNC: 22 IU/L (ref 0–40)
BASOPHILS # BLD MANUAL: 0 X10E3/UL (ref 0–0.2)
BASOPHILS NFR BLD MANUAL: 0 %
BILIRUB SERPL-MCNC: 0.3 MG/DL (ref 0–1.2)
BUN SERPL-MCNC: 18 MG/DL (ref 8–27)
BUN/CREAT SERPL: 16 (ref 10–24)
CALCIUM SERPL-MCNC: 8.9 MG/DL (ref 8.6–10.2)
CCP IGA+IGG SERPL IA-ACNC: 5 UNITS (ref 0–19)
CHLORIDE SERPL-SCNC: 103 MMOL/L (ref 96–106)
CO2 SERPL-SCNC: 26 MMOL/L (ref 18–29)
CREAT SERPL-MCNC: 1.1 MG/DL (ref 0.76–1.27)
CRP SERPL-MCNC: 4.1 MG/L (ref 0–4.9)
DIFFERENTIAL COMMENT, 115260: NORMAL
EOSINOPHIL # BLD MANUAL: 0.1 X10E3/UL (ref 0–0.4)
EOSINOPHIL NFR BLD MANUAL: 3 %
ERYTHROCYTE [DISTWIDTH] IN BLOOD BY AUTOMATED COUNT: 14.5 % (ref 12.3–15.4)
ERYTHROCYTE [SEDIMENTATION RATE] IN BLOOD BY WESTERGREN METHOD: 20 MM/HR (ref 0–30)
GFR SERPLBLD CREATININE-BSD FMLA CKD-EPI: 66 ML/MIN/1.73
GFR SERPLBLD CREATININE-BSD FMLA CKD-EPI: 77 ML/MIN/1.73
GLOBULIN SER CALC-MCNC: 2.5 G/DL (ref 1.5–4.5)
GLUCOSE SERPL-MCNC: 126 MG/DL (ref 65–99)
HCT VFR BLD AUTO: 39.4 % (ref 37.5–51)
HGB BLD-MCNC: 13.2 G/DL (ref 13–17.7)
LYMPHOCYTES # BLD MANUAL: 1.4 X10E3/UL (ref 0.7–3.1)
LYMPHOCYTES NFR BLD MANUAL: 29 %
MCH RBC QN AUTO: 31.3 PG (ref 26.6–33)
MCHC RBC AUTO-ENTMCNC: 33.5 G/DL (ref 31.5–35.7)
MCV RBC AUTO: 93 FL (ref 79–97)
MONOCYTES # BLD MANUAL: 0.4 X10E3/UL (ref 0.1–0.9)
MONOCYTES NFR BLD MANUAL: 8 %
NEUTROPHILS # BLD MANUAL: 3 X10E3/UL (ref 1.4–7)
NEUTROPHILS NFR BLD MANUAL: 60 %
PLATELET # BLD AUTO: 173 X10E3/UL (ref 150–379)
PLATELET BLD QL SMEAR: ADEQUATE
POTASSIUM SERPL-SCNC: 4.1 MMOL/L (ref 3.5–5.2)
PROT SERPL-MCNC: 6.9 G/DL (ref 6–8.5)
RBC # BLD AUTO: 4.22 X10E6/UL (ref 4.14–5.8)
RBC MORPH BLD: NORMAL
RHEUMATOID FACT SERPL-ACNC: 11.9 IU/ML (ref 0–13.9)
SODIUM SERPL-SCNC: 144 MMOL/L (ref 134–144)
URATE SERPL-MCNC: 5.2 MG/DL (ref 3.7–8.6)
WBC # BLD AUTO: 5 X10E3/UL (ref 3.4–10.8)

## 2017-12-21 ENCOUNTER — OFFICE VISIT (OUTPATIENT)
Dept: RHEUMATOLOGY | Age: 72
End: 2017-12-21

## 2017-12-21 VITALS
TEMPERATURE: 98.1 F | WEIGHT: 197.2 LBS | OXYGEN SATURATION: 94 % | DIASTOLIC BLOOD PRESSURE: 70 MMHG | BODY MASS INDEX: 29.12 KG/M2 | HEART RATE: 77 BPM | SYSTOLIC BLOOD PRESSURE: 131 MMHG | RESPIRATION RATE: 16 BRPM

## 2017-12-21 DIAGNOSIS — M10.049 ACUTE IDIOPATHIC GOUT OF HAND, UNSPECIFIED LATERALITY: Primary | ICD-10-CM

## 2017-12-21 RX ORDER — PREDNISONE 5 MG/1
TABLET ORAL
Qty: 30 TAB | Refills: 1 | Status: SHIPPED | OUTPATIENT
Start: 2017-12-21 | End: 2018-02-08 | Stop reason: ALTCHOICE

## 2017-12-21 NOTE — PROGRESS NOTES
RHEUMATOLOGY PROBLEM LIST AND CHIEF COMPLAINT  1. Gout - crystal-proven (9/2016), diagnosed 3 years ago  2. CPPD    INTERVAL HISTORY  This is a 67 y.o.  male. Today, the patient complains of pain in the joints. Location: hand  Severity:  8 on a scale of 0-10  Timing: all day   Duration:  3 days  Context/Associated signs and symptoms: The patient states he is currently flaring and complains of hand pain. He states his flare started 3 days ago. He continues with Allopurinol 300 mg daily and has stopped colchicine because of cost. He has no other complaints. RHEUMATOLOGY REVIEW OF SYSTEMS   Positives as per history  Negatives as follows:  Leonela Duong:  Denies unexplained persistent fevers or weight change  RESPIRATORY:  No pleuritic pain, exertional dyspnea  CARDIOVASCULAR:  Denies chest pain  GASTRO:   Denies heartburn, abdominal pain, nausea, vomiting, diarrhea  SKIN:    Denies rash   MSK:    No morning stiffness >1 hour, persistent joint swelling, persistent joint pain    PAST MEDICAL HISTORY  Reviewed with patient, significant changes in medical history - no    FAMILY HISTORY   No autoimmune disease in the family    MEDICATIONS  All the current medications were reviewed in detail. PHYSICAL EXAM  Blood pressure 131/70, pulse 77, temperature 98.1 °F (36.7 °C), temperature source Oral, resp. rate 16, weight 197 lb 3.2 oz (89.4 kg), SpO2 94 %. GENERAL APPEARANCE: Well-nourished adult in no acute distress. EYES: No scleral erythema, conjunctival injection. ENT: No oral ulcer, parotid enlargement. NECK: No adenopathy, thyroid enlargement. CARDIOVASCULAR: Heart rhythm is regular. No murmur, rub, gallop. CHEST: Normal vesicular breath sounds. No wheezes, rales, pleural friction rubs. ABDOMINAL: The abdomen is soft and nontender. Liver and spleen are nonpalpable. Bowel sounds are normal.  EXTREMITIES: There is no evidence of clubbing, cyanosis, edema.   SKIN: No rash, palpable purpura, digital ulcer, abnormal thickening. NEUROLOGICAL: Normal gait and station, full strength in upper and lower extremities, normal sensation to light touch. MUSCULOSKELETAL:   Upper extremities - Left wrist warmth, no tenderness. Right wrist, MCPs 2nd-4th, worse in the 5th warmth, tenderness. Lower extremities - Right ankle tenderness, warmth. 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 Mild scapholunate advanced collapse. ASSESSMENT  1. Gout (Established problem -  Partial response) - The patient is currently flaring. Recent XR shows signs of CPPD arthropathy so this flare can either be from gout or pseudogout. The treatment is the same, regardless. I will give him Medrol 80 mg IM. He should also start a 4 week taper of prednisone. He should continue with allopurinol 300 mg daily. He should return as scheduled. His most recent uric acid (12/2017) is 5.2. PLAN  1. Allopurinol 300 mg daily   2. Prednisone 10 mg daily; taper as directed  3. Medrol 80 mg IM  4. Return in 1 month    Linh William MD  Adult and Pediatric Rheumatology     [facility] Arthritis and [address] [facility], [address] [place] [address], Phone [phone], Fax [phone]   E-mail: [email or web address]    Visiting  of Pediatrics    Department of Pediatrics, [address] [place] [address], Phone [phone], Fax [phone]  E-mail: [email or web address]    There are no Patient Instructions on file for this visit. cc:  Devorah Chapa MD    Written by sky Zacarias, as dictated by Rolinda Primrose.  Lima William M.D.    Elio Golden NOTE        Chart reviewed for the following:   IMiri, have reviewed the History, Physical and updated the Allergic reactions for [address] performed immediately prior to start of procedure:   I, Courtney Golden, have performed the following reviews on Emiliana Duenas prior to the start of the procedure:            * Patient was identified by name and date of birth   * Agreement on procedure being performed was verified  * Risks and Benefits explained to the patient  * Procedure site verified and marked as necessary  * Patient was positioned for comfort  * Consent was signed and verified     Time: 10:31      Date of procedure: 12/21/2017    Procedure performed by: Courtney Golden MD    Provider assisted by: none     Patient assisted by: self    How tolerated by patient: tolerated the procedure well with no complications    Post Procedural Pain Scale: 0 - No Hurt    Comments: none    PROCEDURE  After consent was obtained, using sterile technique the right intramuscular deltoid was prepped and Depo-medrol 80 mg was then injected and the needle withdrawn. The procedure was well tolerated. The patient is asked to continue to rest for 24 hours before resuming regular activities. It may be more painful for the first 1-2 days. Watch for fever, or increased swelling or persistent pain. Call or return to clinic prn if such symptoms occur.

## 2017-12-21 NOTE — MR AVS SNAPSHOT
Visit Information Date & Time Provider Department Dept. Phone Encounter #  
 12/21/2017 10:00 AM Maria Del Rosario Scott MD [address] [phone] [id number] Your Appointments 1/10/2018 10:30 AM  
ESTABLISHED PATIENT with Maria Del Rosario Scott MD  
[address] ([address]) Appt Note: 1 MONTH F/UP  
 [address] [facility] [address]  
[phone]  
  
   
 [address] [id number] Upcoming Health Maintenance Date Due FOBT Q 1 YEAR AGE 50-75 5/27/1994 ZOSTER VACCINE AGE 60> 3/27/2004 GLAUCOMA SCREENING Q2Y 5/27/2009 Pneumococcal 65+ High/Highest Risk (1 of 2 - PCV13) 5/27/2009 MEDICARE YEARLY EXAM 5/27/2009 DTaP/Tdap/Td series (1 - Tdap) 3/8/2017 Influenza Age 5 to Adult 8/1/2017 Allergies as of 12/21/2017  Review Complete On: 12/21/2017 By: Karla Cano LPN No Known Allergies Current Immunizations  Reviewed on 5/24/2017 Name Date  
 TD Vaccine 8/26/2011  8:04 PM  
 Td, Adsorbed 3/7/2017 12:47 PM  
  
 Not reviewed this visit Vitals BP Pulse Temp Resp Weight(growth percentile) SpO2  
 131/70 (BP 1 Location: Right arm, BP Patient Position: Sitting) 77 98.1 °F (36.7 °C) (Oral) 16 197 lb 3.2 oz (89.4 kg) 94% BMI Smoking Status 29.12 kg/m2 Former Smoker BMI and BSA Data Body Mass Index Body Surface Area  
 29.12 kg/m 2 2.09 m 2 Preferred Pharmacy Pharmacy Name Phone [facility], [address] [place] [address] AT [facility] [facility] [address] [phone] Your Updated Medication List  
  
   
This list is accurate as of: 12/21/17 10:53 AM.  Always use your most recent med list.  
  
  
  
  
 * allopurinol 100 mg tablet Commonly known as:  Boudreaux Bless Take 1 and 1/2 tab po daily. * allopurinol 300 mg tablet Commonly known as:  Boudreaux Bless  
 TAKE 1 TABLET BY MOUTH EVERY DAY  
  
 amLODIPine 5 mg tablet Commonly known as:  Jessica Pace TAKE 1 TABLET BY MOUTH EVERY MORNING  
  
 * aspirin 81 mg chewable tablet Take 1 Tab by mouth daily. * aspirin delayed-release 81 mg tablet TAKE 1 TABLET BY MOUTH EVERY DAY  
  
 atorvastatin 10 mg tablet Commonly known as:  LIPITOR  
TAKE 1 TABLET BY MOUTH ONCE A DAY  
  
 carvedilol 25 mg tablet Commonly known as:  Phineas Hoar Take 1 Tab by mouth two (2) times daily (with meals). cholecalciferol 1,000 unit Cap Commonly known as:  VITAMIN D3 Take  by mouth daily. colchicine 0.6 mg capsule Commonly known as:  Lin Pencil Take 1 Cap by mouth daily. If diarrhea, stop and lower to 0.6 mg every other day  
  
 furosemide 20 mg tablet Commonly known as:  LASIX Take 1 Tab by mouth daily. glipiZIDE SR 5 mg CR tablet Commonly known as:  GLUCOTROL XL Take 5 mg by mouth two (2) times a day. lisinopril 20 mg tablet Commonly known as:  Ross Wilfredo Take  by mouth daily. magnesium oxide 250 mg tablet TAKE 1 TABLET BY MOUTH AS NEEDED ONCE A DAY ORALLY 30 DAY(S)  
  
 pantoprazole 40 mg tablet Commonly known as:  PROTONIX Take 40 mg by mouth daily. potassium chloride SR 10 mEq tablet Commonly known as:  KLOR-CON 10 Take 1 Tab by mouth daily. * predniSONE 1 mg tablet Commonly known as:  Milind Razor Instead of 5 mg tab, take 5 mg once daily. Taper by 1 mg every 2 weeks if tolerated. * predniSONE 5 mg tablet Commonly known as:  DELTASONE  
20mg x 3 days, 15mg x 3 days, 10mg x 3 days, 5mg x 3 days  
  
 tamsulosin 0.4 mg capsule Commonly known as:  FLOMAX Take 0.4 mg by mouth daily. * Notice: This list has 6 medication(s) that are the same as other medications prescribed for you. Read the directions carefully, and ask your doctor or other care provider to review them with you. Introducing [facility] & HEALTH SERVICES! Kamilla Cortes introduces [employer]ibr[employer]­ patient portal. Now you can access parts of your medical record, email your doctor's office, and request medication refills online. 1. In your internet browser, go to [email or web address] 2. Click on the First Time User? Click Here link in the Sign In box. You will see the New Member Sign Up page. 3. Enter your Colibr[employer]­ Access Code exactly as it appears below. You will not need to use this code after youve completed the sign-up process. If you do not sign up before the expiration date, you must request a new code. · Colibr[employer]­ Access Code: [email or web address] Expires: 3/5/2018  3:43 PM 
 
4. Enter the last four digits of your Social Security Number (xxxx) and Date of Birth (mm/dd/yyyy) as indicated and click Submit. You will be taken to the next sign-up page. 5. Create a ColibrÃ­ ID. This will be your [employer]­ login ID and cannot be changed, so think of one that is secure and easy to remember. 6. Create a [employer]ibr[employer]­ password. You can change your password at any time. 7. Enter your Password Reset Question and Answer. This can be used at a later time if you forget your password. 8. Enter your e-mail address. You will receive e-mail notification when new information is available in [address] 9. Click Sign Up. You can now view and download portions of your medical record. 10. Click the Download Summary menu link to download a portable copy of your medical information. If you have questions, please visit the Frequently Asked Questions section of the Colibr[employer]­ website. Remember, Colibr[employer]­ is NOT to be used for urgent needs. For medical emergencies, dial 911. Now available from your iPhone and Android! Please provide this summary of care documentation to your next provider. Your primary care clinician is listed as Eddie Ledesma. If you have any questions after today's visit, please call [phone].

## 2018-01-10 ENCOUNTER — OFFICE VISIT (OUTPATIENT)
Dept: RHEUMATOLOGY | Age: 73
End: 2018-01-10

## 2018-01-10 VITALS
BODY MASS INDEX: 29.33 KG/M2 | TEMPERATURE: 98.3 F | SYSTOLIC BLOOD PRESSURE: 147 MMHG | WEIGHT: 198.6 LBS | RESPIRATION RATE: 16 BRPM | OXYGEN SATURATION: 95 % | DIASTOLIC BLOOD PRESSURE: 70 MMHG | HEART RATE: 77 BPM

## 2018-01-10 DIAGNOSIS — M10.9 POLYARTICULAR GOUT: Primary | ICD-10-CM

## 2018-01-10 NOTE — PROGRESS NOTES
RHEUMATOLOGY PROBLEM LIST AND CHIEF COMPLAINT  1. Gout - crystal-proven (9/2016), diagnosed 3 years ago  2. CPPD    INTERVAL HISTORY  This is a 67 y.o.  male. Today, the patient complains of no pain in the joints. Location: NA  Severity:  0 on a scale of 0-10  Timing: all day   Duration:  1 months  Context/Associated signs and symptoms: The patient states he is doing much better since his last visit and only complains of swelling of his right 5th digit. He continues with Allopurinol 300 mg daily. He is off of prednisone. He complains of trigger finger B/L and mentions previous tenotomy. RHEUMATOLOGY REVIEW OF SYSTEMS   Positives as per history  Negatives as follows:  [place] Holts:  Denies unexplained persistent fevers or weight change  RESPIRATORY:  No pleuritic pain, exertional dyspnea  CARDIOVASCULAR:  Denies chest pain  GASTRO:   Denies heartburn, abdominal pain, nausea, vomiting, diarrhea  SKIN:    Denies rash   MSK:    No morning stiffness >1 hour, persistent joint swelling, persistent joint pain    PAST MEDICAL HISTORY  Reviewed with patient, significant changes in medical history - no    FAMILY HISTORY   No autoimmune disease in the family    MEDICATIONS  All the current medications were reviewed in detail. PHYSICAL EXAM  Blood pressure 147/70, pulse 77, temperature 98.3 °F (36.8 °C), temperature source Oral, resp. rate 16, weight 198 lb 9.6 oz (90.1 kg), SpO2 95 %. GENERAL APPEARANCE: Well-nourished adult in no acute distress. EYES: No scleral erythema, conjunctival injection. ENT: No oral ulcer, parotid enlargement. NECK: No adenopathy, thyroid enlargement. CARDIOVASCULAR: Heart rhythm is regular. No murmur, rub, gallop. CHEST: Normal vesicular breath sounds. No wheezes, rales, pleural friction rubs. ABDOMINAL: The abdomen is soft and nontender. Liver and spleen are nonpalpable.  Bowel sounds are normal.  EXTREMITIES: There is no evidence of clubbing, cyanosis, edema.  SKIN: No rash, palpable purpura, digital ulcer, abnormal thickening. NEUROLOGICAL: Normal gait and station, full strength in upper and lower extremities, normal sensation to light touch. MUSCULOSKELETAL:   Upper extremities - Right 5th MCP mild swelling, no tenderness. B/L trigger finger 4th digit. OA changes. Lower extremities - full range of motion, no tenderness, no swelling, no synovial thickening and no deformity of joints     LABS, RADIOLOGY AND PROCEDURES   Previous labs reviewed -Yes    Uric Acid (12/2017) - 5.2    ASSESSMENT  1. Gout (Established problem -  Very good partial response) - The patient is doing well today. I will check his Uric acid today and pending these results may increase his Allopurinol. For now, he should continue with allopurinol 300 mg daily. He should return in 6 weeks for a follow up. PLAN  1. Allopurinol 300 mg daily; increase pending lab reuslts  2. Uric Acid  3. Return in 6 weeks    Linh Meza MD  Adult and Pediatric Rheumatology     [facility] Arthritis and [address], Phone [phone], Fax [phone]   E-mail: [email or web address]    Visiting  of Pediatrics    Department of Pediatrics, [facility] of [address] [place] [address], [address] [place] [address], Phone [phone], Fax [phone]  E-mail: [email or web address]    There are no Patient Instructions on file for this visit. cc:  Tonia Harper MD    Written by Mela Cockayne, scribe, as dictated by Ange Liz.  Pedro Meza M.D.

## 2018-01-10 NOTE — MR AVS SNAPSHOT
Visit Information Date & Time Provider Department Dept. Phone Encounter #  
 1/10/2018 10:30 AM Kellie Genao MD [address] [phone] [id number] Follow-up Instructions Return in about 6 weeks (around 2/21/2018). Upcoming Health Maintenance Date Due FOBT Q 1 YEAR AGE 50-75 5/27/1994 ZOSTER VACCINE AGE 60> 3/27/2004 GLAUCOMA SCREENING Q2Y 5/27/2009 Pneumococcal 65+ Low/Medium Risk (1 of 2 - PCV13) 5/27/2009 MEDICARE YEARLY EXAM 5/27/2009 DTaP/Tdap/Td series (1 - Tdap) 3/8/2017 Influenza Age 5 to Adult 8/1/2017 Allergies as of 1/10/2018  Review Complete On: 1/10/2018 By: Buddy Rivera LPN No Known Allergies Current Immunizations  Reviewed on 5/24/2017 Name Date  
 TD Vaccine 8/26/2011  8:04 PM  
 Td, Adsorbed 3/7/2017 12:47 PM  
  
 Not reviewed this visit You Were Diagnosed With   
  
 Codes Comments Polyarticular gout    -  Primary ICD-10-CM: M10.00 ICD-9-CM: 274.00 Vitals BP Pulse Temp Resp Weight(growth percentile) SpO2  
 147/70 (BP 1 Location: Left arm, BP Patient Position: Sitting) 77 98.3 °F (36.8 °C) (Oral) 16 198 lb 9.6 oz (90.1 kg) 95% BMI Smoking Status 29.33 kg/m2 Former Smoker BMI and BSA Data Body Mass Index Body Surface Area  
 29.33 kg/m 2 2.09 m 2 Preferred Pharmacy Pharmacy Name Phone [facility] DR[facility] STORE [facility], [address] [place] [address] AT [address] [phone] Your Updated Medication List  
  
   
This list is accurate as of: 1/10/18 10:39 AM.  Always use your most recent med list.  
  
  
  
  
 * allopurinol 100 mg tablet Commonly known as:  Leellen Peers Take 1 and 1/2 tab po daily. * allopurinol 300 mg tablet Commonly known as:  ZYLOPRIM  
TAKE 1 TABLET BY MOUTH EVERY DAY  
  
 amLODIPine 5 mg tablet Commonly known as:  Claudell Masse TAKE 1 TABLET BY MOUTH EVERY MORNING  
  
 * aspirin 81 mg chewable tablet Take 1 Tab by mouth daily. * aspirin delayed-release 81 mg tablet TAKE 1 TABLET BY MOUTH EVERY DAY  
  
 atorvastatin 10 mg tablet Commonly known as:  LIPITOR  
TAKE 1 TABLET BY MOUTH ONCE A DAY  
  
 carvedilol 25 mg tablet Commonly known as:  Raisa Rings Take 1 Tab by mouth two (2) times daily (with meals). cholecalciferol 1,000 unit Cap Commonly known as:  VITAMIN D3 Take  by mouth daily. colchicine 0.6 mg capsule Commonly known as:  Rosario Woodstock Take 1 Cap by mouth daily. If diarrhea, stop and lower to 0.6 mg every other day  
  
 furosemide 20 mg tablet Commonly known as:  LASIX Take 1 Tab by mouth daily. glipiZIDE SR 5 mg CR tablet Commonly known as:  GLUCOTROL XL Take 5 mg by mouth two (2) times a day. lisinopril 20 mg tablet Commonly known as:  Ladona Dunks Take  by mouth daily. magnesium oxide 250 mg tablet TAKE 1 TABLET BY MOUTH AS NEEDED ONCE A DAY ORALLY 30 DAY(S)  
  
 pantoprazole 40 mg tablet Commonly known as:  PROTONIX Take 40 mg by mouth daily. potassium chloride SR 10 mEq tablet Commonly known as:  KLOR-CON 10 Take 1 Tab by mouth daily. * predniSONE 1 mg tablet Commonly known as:  Donneta Liseth Instead of 5 mg tab, take 5 mg once daily. Taper by 1 mg every 2 weeks if tolerated. * predniSONE 5 mg tablet Commonly known as:  DELTASONE  
20mg x 3 days, 15mg x 3 days, 10mg x 3 days, 5mg x 3 days * predniSONE 5 mg tablet Commonly known as:  DELTASONE  
20mg x 3 days, 15mg x 3 days, 10mg x 3 days, 5mg x 3 days  
  
 tamsulosin 0.4 mg capsule Commonly known as:  FLOMAX Take 0.4 mg by mouth daily. * Notice: This list has 7 medication(s) that are the same as other medications prescribed for you. Read the directions carefully, and ask your doctor or other care provider to review them with you. We Performed the Following URIC ACID [id number] CPT(R)] Follow-up Instructions Return in about 6 weeks (around 2/21/2018). Introducing [facility] & HEALTH SERVICES! Elliot Nixon introduces [employer] patient portal. Now you can access parts of your medical record, email your doctor's office, and request medication refills online. 1. In your internet browser, go to https://[email or web address] 2. Click on the First Time User? Click Here link in the Sign In box. You will see the New Member Sign Up page. 3. Enter your Dream[employer] Access Code exactly as it appears below. You will not need to use this code after youve completed the sign-up process. If you do not sign up before the expiration date, you must request a new code. · [employer] Access Code: [email or web address] Expires: 3/5/2018  3:43 PM 
 
4. Enter the last four digits of your Social Security Number (xxxx) and Date of Birth (mm/dd/yyyy) as indicated and click Submit. You will be taken to the next sign-up page. 5. Create a Dream[employer] ID. This will be your [employer] Interactive login ID and cannot be changed, so think of one that is secure and easy to remember. 6. Create a [employer] password. You can change your password at any time. 7. Enter your Password Reset Question and Answer. This can be used at a later time if you forget your password. 8. Enter your e-mail address. You will receive e-mail notification when new information is available in [address] 9. Click Sign Up. You can now view and download portions of your medical record. 10. Click the Download Summary menu link to download a portable copy of your medical information. If you have questions, please visit the Frequently Asked Questions section of the [employer] website. Remember, [employer] is NOT to be used for urgent needs. For medical emergencies, dial 911. Now available from your iPhone and Android! Please provide this summary of care documentation to your next provider. Your primary care clinician is listed as Krystina Weir. If you have any questions after today's visit, please call [phone].

## 2018-01-11 LAB — URATE SERPL-MCNC: 4.6 MG/DL (ref 3.7–8.6)

## 2018-02-08 ENCOUNTER — OFFICE VISIT (OUTPATIENT)
Dept: RHEUMATOLOGY | Age: 73
End: 2018-02-08

## 2018-02-08 VITALS
OXYGEN SATURATION: 92 % | DIASTOLIC BLOOD PRESSURE: 65 MMHG | WEIGHT: 203.4 LBS | SYSTOLIC BLOOD PRESSURE: 144 MMHG | HEART RATE: 85 BPM | BODY MASS INDEX: 30.04 KG/M2 | RESPIRATION RATE: 16 BRPM | TEMPERATURE: 97.6 F

## 2018-02-08 DIAGNOSIS — M10.041 ACUTE IDIOPATHIC GOUT OF RIGHT HAND: Primary | ICD-10-CM

## 2018-02-08 RX ORDER — PREDNISONE 5 MG/1
5 TABLET ORAL 2 TIMES DAILY
Qty: 60 TAB | Refills: 3 | Status: ON HOLD | OUTPATIENT
Start: 2018-02-08 | End: 2018-06-05

## 2018-02-08 RX ORDER — NAPROXEN 500 MG/1
500 TABLET ORAL DAILY
Qty: 30 TAB | Refills: 6 | Status: SHIPPED | OUTPATIENT
Start: 2018-02-08 | End: 2018-02-08 | Stop reason: SDUPTHER

## 2018-02-08 RX ORDER — ALLOPURINOL 300 MG/1
600 TABLET ORAL DAILY
Qty: 60 TAB | Refills: 6 | Status: SHIPPED | OUTPATIENT
Start: 2018-02-08 | End: 2018-02-08 | Stop reason: SDUPTHER

## 2018-02-08 RX ORDER — ALLOPURINOL 300 MG/1
TABLET ORAL
Qty: 180 TAB | Refills: 6 | Status: SHIPPED | OUTPATIENT
Start: 2018-02-08 | End: 2018-04-23 | Stop reason: ALTCHOICE

## 2018-02-08 RX ORDER — NAPROXEN 500 MG/1
TABLET ORAL
Qty: 90 TAB | Refills: 6 | Status: SHIPPED | OUTPATIENT
Start: 2018-02-08 | End: 2018-04-23 | Stop reason: ALTCHOICE

## 2018-02-08 NOTE — PROGRESS NOTES
RHEUMATOLOGY PROBLEM LIST AND CHIEF COMPLAINT  1. Gout - crystal-proven (9/2016), diagnosed 3 years ago; most recent uric acid (4.6)  2. CPPD    INTERVAL HISTORY  This is a 67 y.o.  male. Today, the patient complains of pain in the joints. Location: wrist, hand, ankle  Severity:  5 on a scale of 0-10  Timing: all day   Duration:  1 weeks  Context/Associated signs and symptoms: The patient is currently flaring and complains of pain and swelling of his right hand, wrist, and ankle. He continues with Allopurinol 300 mg daily. Previous steroid injections have provided relief; the patient is diabetic and states it did not raise his blood sugar. He mentions he recently ate shrimp and steak. RHEUMATOLOGY REVIEW OF SYSTEMS   Positives as per history  Negatives as follows:  Ilda Andrade:  Denies unexplained persistent fevers or weight change  RESPIRATORY:  No pleuritic pain, exertional dyspnea  CARDIOVASCULAR:  Denies chest pain  GASTRO:   Denies heartburn, abdominal pain, nausea, vomiting, diarrhea  SKIN:    Denies rash   MSK:    No morning stiffness >1 hour, persistent joint swelling, persistent joint pain    PAST MEDICAL HISTORY  Reviewed with patient, significant changes in medical history - no    FAMILY HISTORY   No autoimmune disease in the family    MEDICATIONS  All the current medications were reviewed in detail. PHYSICAL EXAM  Blood pressure 144/65, pulse 85, temperature 97.6 °F (36.4 °C), temperature source Oral, resp. rate 16, weight 203 lb 6.4 oz (92.3 kg), SpO2 92 %. GENERAL APPEARANCE: Well-nourished adult in no acute distress. EYES: No scleral erythema, conjunctival injection. ENT: No oral ulcer, parotid enlargement. NECK: No adenopathy, thyroid enlargement. CARDIOVASCULAR: Heart rhythm is regular. No murmur, rub, gallop. CHEST: Normal vesicular breath sounds. No wheezes, rales, pleural friction rubs. ABDOMINAL: The abdomen is soft and nontender.  Liver and spleen are nonpalpable. Bowel sounds are normal.  EXTREMITIES: There is no evidence of clubbing, cyanosis, edema. SKIN: No rash, palpable purpura, digital ulcer, abnormal thickening. NEUROLOGICAL: Normal gait and station, full strength in upper and lower extremities, normal sensation to light touch. MUSCULOSKELETAL:   Upper extremities - B/L MCPs, right 5th PIP, right wrist, swelling, tenderness, erythema. OA changes noted  Lower extremities - Right ankle, midfoot swelling, tenderness, dROM  LABS, RADIOLOGY AND PROCEDURES   Previous labs reviewed -Yes    Uric Acid  12/2017 - 5.2  1/2018 - 4.6    ASSESSMENT  1. Gout (Established problem -  Very good partial response) - The patient is currently flaring. His most recent uric acid (1/2018) is 4.6. We will increase his allopurinol to 600 mg daily. In addition, he should start naproxen 500 mg and prednisone 10 mg daily; tapering by 2.5 mg weekly. He should start this taper in a week. I will inject Medrol 80 mg IM. He should return in 1 month for a follow up. PLAN  1. Increase Allopurinol to 600 mg daily  2. Naproxen 500 mg daily  3. Medrol 80 mg IM  4. Prednisone 10 mg daily; taper by 2.5 mg weekly, start in 1 week  5. Return in 1 month    Linh Saldivar MD  Adult and Pediatric Rheumatology     [facility] and [address], Phone [phone], Fax [phone]   E-mail: [email or web address]    Visiting  of Pediatrics    Department of Pediatrics, [facility] of [address] [place] [address], [address] [place] [address], Phone [phone], Fax [phone]  E-mail: [email or web address]    Patient Instructions   Prednisone 10mg x 1 week  Prednisone 7.5mg x 1 week  Prednisone 5mg x 1 week  Prednisone 2.5mg x 1 week      cc:  Molly Munoz MD    Written by sky Gallardo, as dictated by Lori Cartagena.  Khari Saldivar M.D.    [facility] PROCEDURE PROGRESS NOTE        Chart reviewed for the following:   Eva BLACK, have reviewed the History, Physical and updated the Allergic reactions for [address] performed immediately prior to start of procedure:   Eva BLACK, have performed the following reviews on Amberly Urena prior to the start of the procedure:            * Patient was identified by name and date of birth   * Agreement on procedure being performed was verified  * Risks and Benefits explained to the patient  * Procedure site verified and marked as necessary  * Patient was positioned for comfort  * Consent was signed and verified     Time: 11:15      Date of procedure: 2/8/2018    Procedure performed by: Akiko Quigley MD    Provider assisted by: none     Patient assisted by: self    How tolerated by patient: tolerated the procedure well with no complications    Post Procedural Pain Scale: 0 - No Hurt    Comments: none    PROCEDURE  After consent was obtained, using sterile technique the right intramuscular deltoid was prepped and Depo-medrol 80 mg was then injected and the needle withdrawn. The procedure was well tolerated. The patient is asked to continue to rest for 24 hours before resuming regular activities. It may be more painful for the first 1-2 days. Watch for fever, or increased swelling or persistent pain. Call or return to clinic prn if such symptoms occur.

## 2018-02-08 NOTE — MR AVS SNAPSHOT
[address]  [address]isel[address] [place] [address] 
[phone] Patient: Judi Perez MRN: [id number] :1945 Visit Information Date & Time Provider Department Dept. Phone Encounter #  
 2018 11:00 AM Kacie Smith MD [address] [phone] [id number] Follow-up Instructions Return in about 1 month (around 3/8/2018). Your Appointments 2018  8:40 AM  
ESTABLISHED PATIENT with Kacie Smith MD  
[address] ([address]) Appt Note: 6 week flup  
 [address] [facility] [address]  
[phone]  
  
   
 [address] [facility] 7 [id number] Upcoming Health Maintenance Date Due FOBT Q 1 YEAR AGE 50-75 1994 ZOSTER VACCINE AGE 60> 3/27/2004 GLAUCOMA SCREENING Q2Y 2009 Pneumococcal 65+ Low/Medium Risk (1 of 2 - PCV13) 2009 MEDICARE YEARLY EXAM 2009 DTaP/Tdap/Td series (1 - Tdap) 3/8/2017 Influenza Age 5 to Adult 2017 Allergies as of 2018  Review Complete On: 2018 By: Jalen Ocampo LPN No Known Allergies Current Immunizations  Reviewed on 2017 Name Date  
 TD Vaccine 2011  8:04 PM  
 Td, Adsorbed 3/7/2017 12:47 PM  
  
 Not reviewed this visit Vitals BP Pulse Temp Resp Weight(growth percentile) SpO2  
 144/65 (BP 1 Location: Left arm, BP Patient Position: Sitting) 85 97.6 °F (36.4 °C) (Oral) 16 203 lb 6.4 oz (92.3 kg) 92% BMI Smoking Status 30.04 kg/m2 Former Smoker BMI and BSA Data Body Mass Index Body Surface Area 30.04 kg/m 2 2.12 m 2 Preferred Pharmacy Pharmacy Name Phone [facility]ORE [address] AT [address] [place] [address] Drive [phone] Your Updated Medication List  
  
   
 This list is accurate as of: 2/8/18 11:29 AM.  Always use your most recent med list.  
  
  
  
  
 allopurinol 300 mg tablet Commonly known as:  Amanda Sarah Beth Take 2 Tabs by mouth daily for 30 days. Indications: acute gouty arthritis  
  
 amLODIPine 5 mg tablet Commonly known as:  Elayne Davis TAKE 1 TABLET BY MOUTH EVERY MORNING  
  
 * aspirin 81 mg chewable tablet Take 1 Tab by mouth daily. * aspirin delayed-release 81 mg tablet TAKE 1 TABLET BY MOUTH EVERY DAY  
  
 atorvastatin 10 mg tablet Commonly known as:  LIPITOR  
TAKE 1 TABLET BY MOUTH ONCE A DAY  
  
 carvedilol 25 mg tablet Commonly known as:  Clerance Barley Take 1 Tab by mouth two (2) times daily (with meals). cholecalciferol 1,000 unit Cap Commonly known as:  VITAMIN D3 Take  by mouth daily. colchicine 0.6 mg capsule Commonly known as:  Leonela Sina Take 1 Cap by mouth daily. If diarrhea, stop and lower to 0.6 mg every other day  
  
 furosemide 20 mg tablet Commonly known as:  LASIX Take 1 Tab by mouth daily. glipiZIDE SR 5 mg CR tablet Commonly known as:  GLUCOTROL XL Take 5 mg by mouth two (2) times a day. lisinopril 20 mg tablet Commonly known as:  Luetta Manzanilla Take  by mouth daily. magnesium oxide 250 mg tablet TAKE 1 TABLET BY MOUTH AS NEEDED ONCE A DAY ORALLY 30 DAY(S)  
  
 naproxen 500 mg tablet Commonly known as:  NAPROSYN Take 1 Tab by mouth daily. pantoprazole 40 mg tablet Commonly known as:  PROTONIX Take 40 mg by mouth daily. potassium chloride SR 10 mEq tablet Commonly known as:  KLOR-CON 10 Take 1 Tab by mouth daily. predniSONE 5 mg tablet Commonly known as:  Rebecca Savers Take 1 Tab by mouth two (2) times a day. tamsulosin 0.4 mg capsule Commonly known as:  FLOMAX Take 0.4 mg by mouth daily. * Notice: This list has 2 medication(s) that are the same as other medications prescribed for you.  Read the directions carefully, and ask your doctor or other care provider to review them with you. Prescriptions Sent to Pharmacy Refills  
 naproxen (NAPROSYN) 500 mg tablet 6 Sig: Take 1 Tab by mouth daily. Class: Normal  
 Pharmacy: [email or web address] [facility] [address] AT [address] [place] [address] P Ph #: [phone] Route: Oral  
 allopurinol (ZYLOPRIM) 300 mg tablet 6 Sig: Take 2 Tabs by mouth daily for 30 days. Indications: acute gouty arthritis Class: Normal  
 Pharmacy: [email or web address] [address] AT [address] [place] [address] P Ph #: [phone] Route: Oral  
 predniSONE (DELTASONE) 5 mg tablet 3 Sig: Take 1 Tab by mouth two (2) times a day. Class: Normal  
 Pharmacy: [email or web address] [facility] [address] AT [address] [place] [address] P Ph #: [phone] Route: Oral  
  
Follow-up Instructions Return in about 1 month (around 3/8/2018). Patient Instructions Prednisone 10mg x 1 week Prednisone 7.5mg x 1 week Prednisone 5mg x 1 week Prednisone 2.5mg x 1 week Introducing [facility] & [facility]! Shraddha Cordoba introduces NextUser patient portal. Now you can access parts of your medical record, email your doctor's office, and request medication refills online. 1. In your internet browser, go to [email or web address] 2. Click on the First Time User? Click Here link in the Sign In box. You will see the New Member Sign Up page. 3. Enter your NextUser Access Code exactly as it appears below. You will not need to use this code after youve completed the sign-up process. If you do not sign up before the expiration date, you must request a new code. · NextUser Access Code: [email or web address] Expires: 3/5/2018  3:43 PM 
 
4. Enter the last four digits of your Social Security Number (xxxx) and Date of Birth (mm/dd/yyyy) as indicated and click Submit.  You will be taken to the next sign-up page. 5. Create a FiscalNote ID. This will be your FiscalNote login ID and cannot be changed, so think of one that is secure and easy to remember. 6. Create a [employer]Note password. You can change your password at any time. 7. Enter your Password Reset Question and Answer. This can be used at a later time if you forget your password. 8. Enter your e-mail address. You will receive e-mail notification when new information is available in [address] 9. Click Sign Up. You can now view and download portions of your medical record. 10. Click the Download Summary menu link to download a portable copy of your medical information. If you have questions, please visit the Frequently Asked Questions section of the [employer] website. Remember, [employer] is NOT to be used for urgent needs. For medical emergencies, dial 911. Now available from your iPhone and Android! Please provide this summary of care documentation to your next provider. Your primary care clinician is listed as Krystina Weir. If you have any questions after today's visit, please call [phone].

## 2018-02-21 ENCOUNTER — OFFICE VISIT (OUTPATIENT)
Dept: RHEUMATOLOGY | Age: 73
End: 2018-02-21

## 2018-02-21 VITALS
TEMPERATURE: 97.9 F | SYSTOLIC BLOOD PRESSURE: 138 MMHG | BODY MASS INDEX: 29.71 KG/M2 | HEART RATE: 68 BPM | WEIGHT: 201.2 LBS | OXYGEN SATURATION: 96 % | RESPIRATION RATE: 16 BRPM | DIASTOLIC BLOOD PRESSURE: 70 MMHG

## 2018-02-21 DIAGNOSIS — M10.041 ACUTE IDIOPATHIC GOUT OF RIGHT HAND: Primary | ICD-10-CM

## 2018-02-21 NOTE — PROGRESS NOTES
RHEUMATOLOGY PROBLEM LIST AND CHIEF COMPLAINT  1. Gout - crystal-proven (9/2016), diagnosed 3 years ago; most recent uric acid (4.6)  2. CPPD    INTERVAL HISTORY  This is a 67 y.o.  male. Today, the patient complains of no pain in the joints. Location: NA  Severity:  0 on a scale of 0-10  Timing: all day   Duration:  1 months  Context/Associated signs and symptoms: The patient states he is feeling better and has no complaints. His previous steroid injection provided relief. He continues with Allopurinol 600 mg daily and Naproxen 500 mg daily with no issues. He did not taper his prednisone and continues with 5 mg BID. RHEUMATOLOGY REVIEW OF SYSTEMS   Positives as per history  Negatives as follows:  [place]:  Denies unexplained persistent fevers or weight change  RESPIRATORY:  No pleuritic pain, exertional dyspnea  CARDIOVASCULAR:  Denies chest pain  GASTRO:   Denies heartburn, abdominal pain, nausea, vomiting, diarrhea  SKIN:    Denies rash   MSK:    No morning stiffness >1 hour, persistent joint swelling, persistent joint pain    PAST MEDICAL HISTORY  Reviewed with patient, significant changes in medical history - no    FAMILY HISTORY   No autoimmune disease in the family    MEDICATIONS  All the current medications were reviewed in detail. PHYSICAL EXAM  Blood pressure 138/70, pulse 68, temperature 97.9 °F (36.6 °C), temperature source Oral, resp. rate 16, weight 201 lb 3.2 oz (91.3 kg), SpO2 96 %. GENERAL APPEARANCE: Well-nourished adult in no acute distress. EYES: No scleral erythema, conjunctival injection. ENT: No oral ulcer, parotid enlargement. NECK: No adenopathy, thyroid enlargement. CARDIOVASCULAR: Heart rhythm is regular. No murmur, rub, gallop. CHEST: Normal vesicular breath sounds. No wheezes, rales, pleural friction rubs. ABDOMINAL: The abdomen is soft and nontender. Liver and spleen are nonpalpable.  Bowel sounds are normal.  EXTREMITIES: There is no evidence of clubbing, cyanosis, edema. SKIN: No rash, palpable purpura, digital ulcer, abnormal thickening. NEUROLOGICAL: Normal gait and station, full strength in upper and lower extremities, normal sensation to light touch. MUSCULOSKELETAL:   Upper extremities - OA changes noted. Warmth over right 5th digit. Right wrist fullness  Lower extremities - full range of motion, no tenderness, no swelling, no synovial thickening and no deformity of joints     LABS, RADIOLOGY AND PROCEDURES   Previous labs reviewed -Yes    Uric Acid  12/2017 - 5.2  1/2018 - 4.6    ASSESSMENT  1. Gout (Established problem -  Very good partial response) - The patient is doing well. He should taper his prednisone as directed. He should continue with Allopurinol 600 mg daily and Naproxen 500 mg daily. He should return in 2 months for a follow up. PLAN  1. Allopurinol 600 mg daily  2. Naproxen 500 mg daily  3. Prednisone 7.5 mg daily; taper by 2.5 mg weekly  4. Return in 2 months    Linh Wang MD  Adult and Pediatric Rheumatology     [facility] and [address] [place] [address], [address] [facility], Phone [phone], Fax [phone]   E-mail: [email or web address]    Visiting  of Pediatrics    Department of Pediatrics, [facility] of [address] [place] [address], [address] [place] [address], Phone [phone], Fax [phone]  E-mail: [email or web address]    Patient Instructions   Prednisone 7.5mg (1 1/2 tablets) x 7 days  Prednisone 5mg (1 tablet) x 7 days  Prednisone 2.5mg (1/2 tablet) x 7 days      cc: David Thomas MD    Written by sky Massey, as dictated by Brent Crisostomo.  Michelle Wang M.D.

## 2018-02-21 NOTE — MR AVS SNAPSHOT
[address] [facility] David Pulido [facility] 13 
[phone] Patient: Allison Dubois MRN: [id number] :1945 Visit Information Date & Time Provider Department Dept. Phone Encounter #  
 2018  8:40 AM Tawnya Sarmiento MD [address] [phone] [id number] Follow-up Instructions Return in about 2 months (around 2018). Upcoming Health Maintenance Date Due FOBT Q 1 YEAR AGE 50-75 1994 ZOSTER VACCINE AGE 60> 3/27/2004 GLAUCOMA SCREENING Q2Y 2009 Pneumococcal 65+ Low/Medium Risk (1 of 2 - PCV13) 2009 MEDICARE YEARLY EXAM 2009 DTaP/Tdap/Td series (1 - Tdap) 3/8/2017 Influenza Age 5 to Adult 2017 Allergies as of 2018  Review Complete On: 2018 By: Kia Harrington LPN No Known Allergies Current Immunizations  Reviewed on 2017 Name Date  
 TD Vaccine 2011  8:04 PM  
 Td, Adsorbed 3/7/2017 12:47 PM  
  
 Not reviewed this visit Vitals BP Pulse Temp Resp Weight(growth percentile) SpO2  
 138/70 (BP 1 Location: Left arm, BP Patient Position: Sitting) 68 97.9 °F (36.6 °C) (Oral) 16 201 lb 3.2 oz (91.3 kg) 96% BMI Smoking Status 29.71 kg/m2 Former Smoker BMI and BSA Data Body Mass Index Body Surface Area  
 29.71 kg/m 2 2.11 m 2 Preferred Pharmacy Pharmacy Name Phone [facility], [address] [place] [address] AT [facility] [phone] Your Updated Medication List  
  
   
This list is accurate as of 18  9:20 AM.  Always use your most recent med list.  
  
  
  
  
 allopurinol 300 mg tablet Commonly known as:  ZYLOPRIM  
TAKE 2 TABLETS BY MOUTH DAILY FOR ACUTE GOUTY ARTHRITIS  
  
 amLODIPine 5 mg tablet Commonly known as:  Jillyn Boast TAKE 1 TABLET BY MOUTH EVERY MORNING  
  
 * aspirin 81 mg chewable tablet Take 1 Tab by mouth daily. * aspirin delayed-release 81 mg tablet TAKE 1 TABLET BY MOUTH EVERY DAY  
  
 atorvastatin 10 mg tablet Commonly known as:  LIPITOR  
TAKE 1 TABLET BY MOUTH ONCE A DAY  
  
 carvedilol 25 mg tablet Commonly known as:  Ron Nogueira Take 1 Tab by mouth two (2) times daily (with meals). cholecalciferol 1,000 unit Cap Commonly known as:  VITAMIN D3 Take  by mouth daily. colchicine 0.6 mg capsule Commonly known as:  Lauretha Orthodoxy Take 1 Cap by mouth daily. If diarrhea, stop and lower to 0.6 mg every other day  
  
 furosemide 20 mg tablet Commonly known as:  LASIX Take 1 Tab by mouth daily. glipiZIDE SR 5 mg CR tablet Commonly known as:  GLUCOTROL XL Take 5 mg by mouth two (2) times a day. lisinopril 20 mg tablet Commonly known as:  Avtar Art Take  by mouth daily. magnesium oxide 250 mg tablet TAKE 1 TABLET BY MOUTH AS NEEDED ONCE A DAY ORALLY 30 DAY(S)  
  
 naproxen 500 mg tablet Commonly known as:  NAPROSYN  
TAKE 1 TABLET BY MOUTH DAILY pantoprazole 40 mg tablet Commonly known as:  PROTONIX Take 40 mg by mouth daily. potassium chloride SR 10 mEq tablet Commonly known as:  KLOR-CON 10 Take 1 Tab by mouth daily. predniSONE 5 mg tablet Commonly known as:  Burnard Nation Take 1 Tab by mouth two (2) times a day. tamsulosin 0.4 mg capsule Commonly known as:  FLOMAX Take 0.4 mg by mouth daily. * Notice: This list has 2 medication(s) that are the same as other medications prescribed for you. Read the directions carefully, and ask your doctor or other care provider to review them with you. Follow-up Instructions Return in about 2 months (around 4/21/2018). Patient Instructions Prednisone 7.5mg (1 1/2 tablets) x 7 days Prednisone 5mg (1 tablet) x 7 days Prednisone 2.5mg (1/2 tablet) x 7 days Introducing \[facility]\"" & [facility] SERVICES! Tony Lomas introduces [employer] patient portal. Now you can access parts of your medical record, email your doctor's office, and request medication refills online. 1. In your internet browser, go to [email or web address] 2. Click on the First Time User? Click Here link in the Sign In box. You will see the New Member Sign Up page. 3. Enter your [employer] Access Code exactly as it appears below. You will not need to use this code after youve completed the sign-up process. If you do not sign up before the expiration date, you must request a new code. · [employer] Access Code: [email or web address] Expires: 3/5/2018  3:43 PM 
 
4. Enter the last four digits of your Social Security Number (xxxx) and Date of Birth (mm/dd/yyyy) as indicated and click Submit. You will be taken to the next sign-up page. 5. Create a [employer] ID. This will be your [employer] login ID and cannot be changed, so think of one that is secure and easy to remember. 6. Create a [employer] password. You can change your password at any time. 7. Enter your Password Reset Question and Answer. This can be used at a later time if you forget your password. 8. Enter your e-mail address. You will receive e-mail notification when new information is available in [address] 9. Click Sign Up. You can now view and download portions of your medical record. 10. Click the Download Summary menu link to download a portable copy of your medical information. If you have questions, please visit the Frequently Asked Questions section of the [employer] website. Remember, [employer] is NOT to be used for urgent needs. For medical emergencies, dial 911. Now available from your iPhone and Android! Please provide this summary of care documentation to your next provider. Your primary care clinician is listed as Maico Wilson. If you have any questions after today's visit, please call [phone].

## 2018-02-21 NOTE — PATIENT INSTRUCTIONS
Prednisone 7.5mg (1 1/2 tablets) x 7 days  Prednisone 5mg (1 tablet) x 7 days  Prednisone 2.5mg (1/2 tablet) x 7 days

## 2018-04-23 ENCOUNTER — OFFICE VISIT (OUTPATIENT)
Dept: RHEUMATOLOGY | Age: 73
End: 2018-04-23

## 2018-04-23 VITALS
TEMPERATURE: 97.6 F | BODY MASS INDEX: 31.04 KG/M2 | WEIGHT: 210.2 LBS | HEART RATE: 73 BPM | OXYGEN SATURATION: 96 % | DIASTOLIC BLOOD PRESSURE: 48 MMHG | RESPIRATION RATE: 16 BRPM | SYSTOLIC BLOOD PRESSURE: 125 MMHG

## 2018-04-23 DIAGNOSIS — M10.9 GOUTY ARTHRITIS: Primary | ICD-10-CM

## 2018-04-23 DIAGNOSIS — M10.041 ACUTE IDIOPATHIC GOUT OF RIGHT HAND: ICD-10-CM

## 2018-04-23 DIAGNOSIS — M10.9 POLYARTICULAR GOUT: ICD-10-CM

## 2018-04-23 RX ORDER — NAPROXEN 500 MG/1
500 TABLET ORAL 2 TIMES DAILY WITH MEALS
Qty: 180 TAB | Refills: 6 | Status: SHIPPED | OUTPATIENT
Start: 2018-04-23 | End: 2018-07-22

## 2018-04-23 RX ORDER — LANOLIN ALCOHOL/MO/W.PET/CERES
1000 CREAM (GRAM) TOPICAL DAILY
COMMUNITY

## 2018-04-23 RX ORDER — POTASSIUM CHLORIDE 750 MG/1
CAPSULE, EXTENDED RELEASE ORAL
Refills: 3 | Status: ON HOLD | COMMUNITY
Start: 2018-01-29 | End: 2018-06-05

## 2018-04-23 RX ORDER — POLYETHYLENE GLYCOL-3350 AND ELECTROLYTES WITH FLAVOR PACK 240; 5.84; 2.98; 6.72; 22.72 G/278.26G; G/278.26G; G/278.26G; G/278.26G; G/278.26G
POWDER, FOR SOLUTION ORAL
Refills: 0 | COMMUNITY
Start: 2018-04-17 | End: 2019-01-03

## 2018-04-23 RX ORDER — ALLOPURINOL 300 MG/1
600 TABLET ORAL DAILY
Qty: 180 TAB | Refills: 6 | Status: SHIPPED | OUTPATIENT
Start: 2018-04-23 | End: 2019-07-12 | Stop reason: SDUPTHER

## 2018-04-23 NOTE — MR AVS SNAPSHOT
[address]  [place] Francisco Baer [address] [facility] [address] 
[phone] Patient: Yasmine Ornelas MRN: [id number] :1945 Visit Information Date & Time Provider Department Dept. Phone Encounter #  
 2018  8:40 AM Michelle Tucker MD [address] [phone] [id number] Follow-up Instructions Return in about 3 months (around 2018). Upcoming Health Maintenance Date Due FOBT Q 1 YEAR AGE 50-75 1994 ZOSTER VACCINE AGE 60> 3/27/2004 GLAUCOMA SCREENING Q2Y 2009 Pneumococcal 65+ Low/Medium Risk (1 of 2 - PCV13) 2009 DTaP/Tdap/Td series (1 - Tdap) 3/8/2017 Influenza Age 5 to Adult 2017 MEDICARE YEARLY EXAM 3/14/2018 Allergies as of 2018  Review Complete On: 2018 By: Javier Reagan LPN No Known Allergies Current Immunizations  Reviewed on 2017 Name Date  
 TD Vaccine 2011  8:04 PM  
 Td, Adsorbed 3/7/2017 12:47 PM  
  
 Not reviewed this visit You Were Diagnosed With   
  
 Codes Comments Gouty arthritis    -  Primary ICD-10-CM: M10.9 ICD-9-CM: 274.00 Vitals BP Pulse Temp Resp Weight(growth percentile) SpO2  
 125/48 (BP 1 Location: Right arm, BP Patient Position: Sitting) 73 97.6 °F (36.4 °C) (Oral) 16 210 lb 3.2 oz (95.3 kg) 96% BMI Smoking Status 31.04 kg/m2 Former Smoker BMI and BSA Data Body Mass Index Body Surface Area 31.04 kg/m 2 2.15 m 2 Preferred Pharmacy Pharmacy Name Phone [facility] DR[facility] STORE [facility], [address] [place] [address] AT [address] [place] [address] Drive [phone] Your Updated Medication List  
  
   
This list is accurate as of 18  9:00 AM.  Always use your most recent med list.  
  
  
  
  
 allopurinol 300 mg tablet Commonly known as:  Vena Ditch Take 2 Tabs by mouth daily for 90 days.  Indications: acute gouty arthritis amLODIPine 5 mg tablet Commonly known as:  Zulema Chimlogan TAKE 1 TABLET BY MOUTH EVERY MORNING  
  
 * aspirin 81 mg chewable tablet Take 1 Tab by mouth daily. * aspirin delayed-release 81 mg tablet TAKE 1 TABLET BY MOUTH EVERY DAY  
  
 atorvastatin 10 mg tablet Commonly known as:  LIPITOR  
TAKE 1 TABLET BY MOUTH ONCE A DAY  
  
 carvedilol 25 mg tablet Commonly known as:  Lissette Awe Take 1 Tab by mouth two (2) times daily (with meals). cholecalciferol 1,000 unit Cap Commonly known as:  VITAMIN D3 Take  by mouth daily. colchicine 0.6 mg capsule Commonly known as:  Georgeana Searing Take 1 Cap by mouth daily. If diarrhea, stop and lower to 0.6 mg every other day  
  
 furosemide 20 mg tablet Commonly known as:  LASIX Take 1 Tab by mouth daily. GAVILYTE-C 240-22.72-6.72 -5.84 gram solution Generic drug:  PEG 3350-Electrolytes TAKE AS DIRECTED  
  
 glipiZIDE SR 5 mg CR tablet Commonly known as:  GLUCOTROL XL Take 5 mg by mouth two (2) times a day. lisinopril 20 mg tablet Commonly known as:  Jac Human Take  by mouth daily. magnesium oxide 250 mg tablet TAKE 1 TABLET BY MOUTH AS NEEDED ONCE A DAY ORALLY 30 DAY(S)  
  
 naproxen 500 mg tablet Commonly known as:  NAPROSYN Take 1 Tab by mouth two (2) times daily (with meals) for 90 days. pantoprazole 40 mg tablet Commonly known as:  PROTONIX Take 40 mg by mouth daily. * potassium chloride SR 10 mEq tablet Commonly known as:  KLOR-CON 10 Take 1 Tab by mouth daily. * potassium chloride SA 10 mEq capsule Commonly known as:  MICRO-K  
TAKE ONE CAPSULE BY MOUTH EVERY DAY WITH FOOD  
  
 predniSONE 5 mg tablet Commonly known as:  Dalbert Phan Take 1 Tab by mouth two (2) times a day. tamsulosin 0.4 mg capsule Commonly known as:  FLOMAX Take 0.4 mg by mouth daily. VITAMIN B-12 1,000 mcg tablet Generic drug:  cyanocobalamin Take 1,000 mcg by mouth daily. * Notice: This list has 4 medication(s) that are the same as other medications prescribed for you. Read the directions carefully, and ask your doctor or other care provider to review them with you. Prescriptions Sent to Pharmacy Refills  
 allopurinol (ZYLOPRIM) 300 mg tablet 6 Sig: Take 2 Tabs by mouth daily for 90 days. Indications: acute gouty arthritis Class: Normal  
 Pharmacy: [facility] [address] AT [address] [place] [address] P Ph #: [phone] Route: Oral  
 naproxen (NAPROSYN) 500 mg tablet 6 Sig: Take 1 Tab by mouth two (2) times daily (with meals) for 90 days. Class: Normal  
 Pharmacy: [facility] [address] AT [address] [place] [address] P Ph #: [phone] Route: Oral  
  
We Performed the Following CBC+PLATELET+HEM REVIEW [80615 CPT(R)] METABOLIC PANEL, COMPREHENSIVE [92622 CPT(R)] URIC ACID C545734 CPT(R)] Follow-up Instructions Return in about 3 months (around 7/23/2018). Introducing [facility] & HEALTH SERVICES! Guera Lopez introduces [employer] patient portal. Now you can access parts of your medical record, email your doctor's office, and request medication refills online. 1. In your internet browser, go to [email or web address] 2. Click on the First Time User? Click Here link in the Sign In box. You will see the New Member Sign Up page. 3. Enter your [employer] Access Code exactly as it appears below. You will not need to use this code after youve completed the sign-up process. If you do not sign up before the expiration date, you must request a new code. · [employer] Access Code: [email or web address] Expires: 7/22/2018  9:00 AM 
 
4. Enter the last four digits of your Social Security Number (xxxx) and Date of Birth (mm/dd/yyyy) as indicated and click Submit. You will be taken to the next sign-up page. 5. Create a [employer] ID. This will be your [employer] login ID and cannot be changed, so think of one that is secure and easy to remember. 6. Create a [employer] password. You can change your password at any time. 7. Enter your Password Reset Question and Answer. This can be used at a later time if you forget your password. 8. Enter your e-mail address. You will receive e-mail notification when new information is available in [address] 9. Click Sign Up. You can now view and download portions of your medical record. 10. Click the Download Summary menu link to download a portable copy of your medical information. If you have questions, please visit the Frequently Asked Questions section of the [employer] website. Remember, [employer] is NOT to be used for urgent needs. For medical emergencies, dial 911. Now available from your iPhone and Android! Please provide this summary of care documentation to your next provider. Your primary care clinician is listed as Efrain Salas. If you have any questions after today's visit, please call [phone].

## 2018-04-23 NOTE — PROGRESS NOTES
RHEUMATOLOGY PROBLEM LIST AND CHIEF COMPLAINT  1. Gout - crystal-proven (9/2016), diagnosed 3 years ago; most recent uric acid (4.6)  2. CPPD    INTERVAL HISTORY  This is a 67 y.o.  male. Today, the patient complains of no pain in the joints. Location: NA  Severity:  0 on a scale of 0-10  Timing: all day   Duration:  2 months  Context/Associated signs and symptoms: The patient is doing well. He mentions some swelling over his left hand and has no complaints otherwise. He continues with Allopurinol 600 mg daily and Naproxen 500 mg daily. He has tapered off of prednisone with no issues. RHEUMATOLOGY REVIEW OF SYSTEMS   Positives as per history  Negatives as follows:  Alvarez Snyder:  Denies unexplained persistent fevers or weight change  RESPIRATORY:  No pleuritic pain, exertional dyspnea  CARDIOVASCULAR:  Denies chest pain  GASTRO:   Denies heartburn, abdominal pain, nausea, vomiting, diarrhea  SKIN:    Denies rash   MSK:    No morning stiffness >1 hour, persistent joint swelling, persistent joint pain    PAST MEDICAL HISTORY  Reviewed with patient, significant changes in medical history - no    FAMILY HISTORY   No autoimmune disease in the family    MEDICATIONS  All the current medications were reviewed in detail. PHYSICAL EXAM  Blood pressure 125/48, pulse 73, temperature 97.6 °F (36.4 °C), temperature source Oral, resp. rate 16, weight 210 lb 3.2 oz (95.3 kg), SpO2 96 %. GENERAL APPEARANCE: Well-nourished adult in no acute distress. EYES: No scleral erythema, conjunctival injection. ENT: No oral ulcer, parotid enlargement. NECK: No adenopathy, thyroid enlargement. CARDIOVASCULAR: Heart rhythm is regular. No murmur, rub, gallop. CHEST: Normal vesicular breath sounds. No wheezes, rales, pleural friction rubs. ABDOMINAL: The abdomen is soft and nontender. Liver and spleen are nonpalpable. Bowel sounds are normal.  EXTREMITIES: There is no evidence of clubbing, cyanosis, edema.   SKIN: No rash, palpable purpura, digital ulcer, abnormal thickening. NEUROLOGICAL: Normal gait and station, full strength in upper and lower extremities, normal sensation to light touch. MUSCULOSKELETAL:   Upper extremities - OA changes noted. B/L Hands mild dROM, no synovitis. Lower extremities - full range of motion, no tenderness, no swelling, no synovial thickening and no deformity of joints     LABS, RADIOLOGY AND PROCEDURES   Previous labs reviewed -Yes    Uric Acid  12/2017 - 5.2  1/2018 - 4.6    ASSESSMENT  1. Gout (Established problem -  Very good partial response) - The patient is doing well. We will continue to monitor his uric acid; if it is normal today we will most likely stop his Naproxen. For now, He should continue with allopurinol 600 mg daily and Naproxen 500 mg daily. He should return in 3 months for a follow up. PLAN  1. Allopurinol 600 mg daily  2. Naproxen 500 mg daily; cessation pending  3. CBC, CMP, Uric Acid  4. Return in 3 months    Linh Ceballos MD  Adult and Pediatric Rheumatology     [facility] Arthritis and [address] [facility], [address] [place] [address], Phone [phone], Fax [phone]   E-mail: [email or web address]    Visiting  of Pediatrics    Department of Pediatrics, [facility] of [address] [place] [address], [address] [place] [address], Phone [phone], Fax [phone]  E-mail: [email or web address]    There are no Patient Instructions on file for this visit. cc:  MD Ronald MaysEndless Mountains Health Systems    Written by sky Ward, as dictated by Gavin Cao.  Curtis Ceballos M.D.

## 2018-04-26 LAB
ALBUMIN SERPL-MCNC: 3.7 G/DL (ref 3.5–4.8)
ALBUMIN/GLOB SERPL: 1.7 {RATIO} (ref 1.2–2.2)
ALP SERPL-CCNC: 69 IU/L (ref 39–117)
ALT SERPL-CCNC: 15 IU/L (ref 0–44)
AST SERPL-CCNC: 18 IU/L (ref 0–40)
BASOPHILS # BLD MANUAL: 0 X10E3/UL (ref 0–0.2)
BASOPHILS NFR BLD MANUAL: 0 %
BILIRUB SERPL-MCNC: 0.3 MG/DL (ref 0–1.2)
BUN SERPL-MCNC: 21 MG/DL (ref 8–27)
BUN/CREAT SERPL: 18 (ref 10–24)
CALCIUM SERPL-MCNC: 8.4 MG/DL (ref 8.6–10.2)
CHLORIDE SERPL-SCNC: 108 MMOL/L (ref 96–106)
CO2 SERPL-SCNC: 26 MMOL/L (ref 18–29)
CREAT SERPL-MCNC: 1.19 MG/DL (ref 0.76–1.27)
DIFFERENTIAL COMMENT, 115260: ABNORMAL
EOSINOPHIL # BLD MANUAL: 0.2 X10E3/UL (ref 0–0.4)
EOSINOPHIL NFR BLD MANUAL: 4 %
ERYTHROCYTE [DISTWIDTH] IN BLOOD BY AUTOMATED COUNT: 14.8 % (ref 12.3–15.4)
GFR SERPLBLD CREATININE-BSD FMLA CKD-EPI: 60 ML/MIN/1.73
GFR SERPLBLD CREATININE-BSD FMLA CKD-EPI: 70 ML/MIN/1.73
GLOBULIN SER CALC-MCNC: 2.2 G/DL (ref 1.5–4.5)
GLUCOSE SERPL-MCNC: 156 MG/DL (ref 65–99)
HCT VFR BLD AUTO: 36 % (ref 37.5–51)
HGB BLD-MCNC: 11.4 G/DL (ref 13–17.7)
LYMPHOCYTES # BLD MANUAL: 1.1 X10E3/UL (ref 0.7–3.1)
LYMPHOCYTES NFR BLD MANUAL: 23 %
MCH RBC QN AUTO: 30.7 PG (ref 26.6–33)
MCHC RBC AUTO-ENTMCNC: 31.7 G/DL (ref 31.5–35.7)
MCV RBC AUTO: 97 FL (ref 79–97)
MONOCYTES # BLD MANUAL: 0.3 X10E3/UL (ref 0.1–0.9)
MONOCYTES NFR BLD MANUAL: 7 %
NEUTROPHILS # BLD MANUAL: 3.2 X10E3/UL (ref 1.4–7)
NEUTROPHILS NFR BLD MANUAL: 66 %
PLATELET # BLD AUTO: 194 X10E3/UL (ref 150–379)
PLATELET BLD QL SMEAR: ADEQUATE
POTASSIUM SERPL-SCNC: 5 MMOL/L (ref 3.5–5.2)
PROT SERPL-MCNC: 5.9 G/DL (ref 6–8.5)
RBC # BLD AUTO: 3.71 X10E6/UL (ref 4.14–5.8)
RBC MORPH BLD: ABNORMAL
SODIUM SERPL-SCNC: 146 MMOL/L (ref 134–144)
URATE SERPL-MCNC: 3 MG/DL (ref 3.7–8.6)
WBC # BLD AUTO: 4.9 X10E3/UL (ref 3.4–10.8)

## 2018-04-26 RX ORDER — NAPROXEN 500 MG/1
500 TABLET ORAL 2 TIMES DAILY WITH MEALS
Qty: 60 TAB | Refills: 6 | Status: SHIPPED | OUTPATIENT
Start: 2018-04-26 | End: 2018-05-26

## 2018-04-30 ENCOUNTER — TELEPHONE (OUTPATIENT)
Dept: RHEUMATOLOGY | Age: 73
End: 2018-04-30

## 2018-04-30 NOTE — TELEPHONE ENCOUNTER
Spoke to pt informed pt per Dr Curtis Ceballos that the labs were good and uric acid is 3 and informed pt that he can stop taking the naprosyn, pt verbally acknowledged understanding

## 2018-04-30 NOTE — TELEPHONE ENCOUNTER
----- Message from Denisse Mcgee MD sent at 4/30/2018 10:28 AM EDT -----  Please tell him labs look good. Uric acid is 3. He can stop naprosyn.

## 2018-06-05 ENCOUNTER — ANESTHESIA (OUTPATIENT)
Dept: ENDOSCOPY | Age: 73
End: 2018-06-05
Payer: MEDICARE

## 2018-06-05 ENCOUNTER — HOSPITAL ENCOUNTER (OUTPATIENT)
Age: 73
Setting detail: OUTPATIENT SURGERY
Discharge: HOME | End: 2018-06-05
Attending: INTERNAL MEDICINE | Admitting: INTERNAL MEDICINE
Payer: MEDICARE

## 2018-06-05 ENCOUNTER — ANESTHESIA EVENT (OUTPATIENT)
Dept: ENDOSCOPY | Age: 73
End: 2018-06-05
Payer: MEDICARE

## 2018-06-05 VITALS
TEMPERATURE: 97.8 F | OXYGEN SATURATION: 98 % | BODY MASS INDEX: 27.09 KG/M2 | HEIGHT: 72 IN | HEART RATE: 73 BPM | DIASTOLIC BLOOD PRESSURE: 77 MMHG | SYSTOLIC BLOOD PRESSURE: 153 MMHG | WEIGHT: 200 LBS | RESPIRATION RATE: 21 BRPM

## 2018-06-05 LAB
H PYLORI FROM TISSUE: NEGATIVE
KIT LOT NO., HCLOLOT: NORMAL
NEGATIVE CONTROL: NEGATIVE
POSITIVE CONTROL: POSITIVE

## 2018-06-05 PROCEDURE — 74011000250 HC RX REV CODE- 250

## 2018-06-05 PROCEDURE — 76040000019: Performed by: INTERNAL MEDICINE

## 2018-06-05 PROCEDURE — 74011250636 HC RX REV CODE- 250/636

## 2018-06-05 PROCEDURE — 88305 TISSUE EXAM BY PATHOLOGIST: CPT | Performed by: INTERNAL MEDICINE

## 2018-06-05 PROCEDURE — 77030019988 HC FCPS ENDOSC DISP BSC -B: Performed by: INTERNAL MEDICINE

## 2018-06-05 PROCEDURE — 74011250636 HC RX REV CODE- 250/636: Performed by: INTERNAL MEDICINE

## 2018-06-05 PROCEDURE — 87077 CULTURE AEROBIC IDENTIFY: CPT | Performed by: INTERNAL MEDICINE

## 2018-06-05 PROCEDURE — 76060000031 HC ANESTHESIA FIRST 0.5 HR: Performed by: INTERNAL MEDICINE

## 2018-06-05 RX ORDER — SODIUM CHLORIDE 9 MG/ML
100 INJECTION, SOLUTION INTRAVENOUS CONTINUOUS
Status: DISCONTINUED | OUTPATIENT
Start: 2018-06-05 | End: 2018-06-05 | Stop reason: HOSPADM

## 2018-06-05 RX ORDER — FLUMAZENIL 0.1 MG/ML
0.2 INJECTION INTRAVENOUS
Status: DISCONTINUED | OUTPATIENT
Start: 2018-06-05 | End: 2018-06-05 | Stop reason: HOSPADM

## 2018-06-05 RX ORDER — FENTANYL CITRATE 50 UG/ML
25 INJECTION, SOLUTION INTRAMUSCULAR; INTRAVENOUS
Status: DISCONTINUED | OUTPATIENT
Start: 2018-06-05 | End: 2018-06-05 | Stop reason: HOSPADM

## 2018-06-05 RX ORDER — SODIUM CHLORIDE 0.9 % (FLUSH) 0.9 %
5-10 SYRINGE (ML) INJECTION EVERY 8 HOURS
Status: DISCONTINUED | OUTPATIENT
Start: 2018-06-05 | End: 2018-06-05 | Stop reason: HOSPADM

## 2018-06-05 RX ORDER — SODIUM CHLORIDE 0.9 % (FLUSH) 0.9 %
5-10 SYRINGE (ML) INJECTION AS NEEDED
Status: DISCONTINUED | OUTPATIENT
Start: 2018-06-05 | End: 2018-06-05 | Stop reason: HOSPADM

## 2018-06-05 RX ORDER — LIDOCAINE HYDROCHLORIDE 20 MG/ML
INJECTION, SOLUTION EPIDURAL; INFILTRATION; INTRACAUDAL; PERINEURAL AS NEEDED
Status: DISCONTINUED | OUTPATIENT
Start: 2018-06-05 | End: 2018-06-05 | Stop reason: HOSPADM

## 2018-06-05 RX ORDER — DEXTROMETHORPHAN/PSEUDOEPHED 2.5-7.5/.8
1.2 DROPS ORAL
Status: DISCONTINUED | OUTPATIENT
Start: 2018-06-05 | End: 2018-06-05 | Stop reason: HOSPADM

## 2018-06-05 RX ORDER — MIDAZOLAM HYDROCHLORIDE 1 MG/ML
1-2 INJECTION, SOLUTION INTRAMUSCULAR; INTRAVENOUS
Status: DISCONTINUED | OUTPATIENT
Start: 2018-06-05 | End: 2018-06-05 | Stop reason: HOSPADM

## 2018-06-05 RX ORDER — PROPOFOL 10 MG/ML
INJECTION, EMULSION INTRAVENOUS AS NEEDED
Status: DISCONTINUED | OUTPATIENT
Start: 2018-06-05 | End: 2018-06-05 | Stop reason: HOSPADM

## 2018-06-05 RX ORDER — ATROPINE SULFATE 0.1 MG/ML
0.5 INJECTION INTRAVENOUS
Status: DISCONTINUED | OUTPATIENT
Start: 2018-06-05 | End: 2018-06-05 | Stop reason: HOSPADM

## 2018-06-05 RX ADMIN — PROPOFOL 220 MG: 10 INJECTION, EMULSION INTRAVENOUS at 13:58

## 2018-06-05 RX ADMIN — SODIUM CHLORIDE 100 ML/HR: 900 INJECTION, SOLUTION INTRAVENOUS at 13:05

## 2018-06-05 RX ADMIN — LIDOCAINE HYDROCHLORIDE 50 MG: 20 INJECTION, SOLUTION EPIDURAL; INFILTRATION; INTRACAUDAL; PERINEURAL at 13:38

## 2018-06-05 NOTE — PROCEDURES
[facility]                   Endoscopic Gastroduodenoscopy Procedure Note      6/5/2018  Nance Pallas  1945  [id number]    Procedure: Endoscopic Gastroduodenoscopy with biopsy    Indication: heme positive and iron deficient     Pre-operative Diagnosis: see indication above    Post-operative Diagnosis: see findings below    : MAMIE Gamez MD    Referring Provider:  Ezra Rojo MD      Anesthesia/Sedation:  MAC anesthesia Propofol    Airway assessment: No airway problems anticipated    Pre-Procedural Exam:      Airway: clear, no airway problems anticipated  Heart: RRR, without gallops or rubs  Lungs: clear bilaterally without wheezes, crackles, or rhonchi  Abdomen: soft, nontender, nondistended, bowel sounds present  Mental Status: awake, alert and oriented to person, place and time       Procedure Details     After infomed consent was obtained for the procedure, with all risks and benefits of procedure explained the patient was taken to the endoscopy suite and placed in the left lateral decubitus position. Following sequential administration of sedation as per above, the endoscope was inserted into the mouth and advanced under direct vision to second portion of the duodenum. A careful inspection was made as the gastroscope was withdrawn, including a retroflexed view of the proximal stomach; findings and interventions are described below. Findings:   Esophagus:normal muocsa. Medium sized hiatal hernia  Stomach: normal . Biopsied for pyloritek  Duodenum: normal, biopsied    Therapies:  biopsy of duodenal second portion    Specimens: duodenal biopsies           Complications:   None; patient tolerated the procedure well. EBL:  None. Impression:      -Hiatal hernia, otherwise normal    Recommendations:  -Await pathology. , -proceed with colonoscopy    Ashok Thompson MD6/5/2018

## 2018-06-05 NOTE — PERIOP NOTES
Anesthesia reports 220 mg Propofol, 50 mg Lidocaine and 300 mL NS given during procedure. Received report from anesthesia staff on vital signs and status of patient.

## 2018-06-05 NOTE — ROUTINE PROCESS
Yarely Escobedo  1945  [id number]    Situation:  Verbal report received from: May Perkins RN  Procedure: Procedure(s):  COLONOSCOPY  ESOPHAGOGASTRODUODENAL (EGD) BIOPSY  ESOPHAGOGASTRODUODENOSCOPY (EGD)    Background:    Preoperative diagnosis: ANEMIA, HEME POSITIVE STOOL, HISTORY IRON DEFICIENCY ANEMIA  Postoperative diagnosis:   hiatial hernia, hemorrhoids    :  Dr. Tuan Mcmullen  Assistant(s): Endoscopy Technician-1: Vikki Varela  Endoscopy RN-1: Adán Haider    Specimens:   ID Type Source Tests Collected by Time Destination   1 : bx Preservative Duodenum  Jessica Saba MD 6/5/2018 1346 Pathology     H. Pylori  yes    Assessment:  Intra-procedure medications       Anesthesia gave intra-procedure sedation and medications, see anesthesia flow sheet yes    Intravenous fluids: NS@ KVO     Vital signs stable     Abdominal assessment: round and soft     Recommendation:  Discharge patient per MD order.     Family or Friend   Permission to share finding with family or friend yes

## 2018-06-05 NOTE — ANESTHESIA POSTPROCEDURE EVALUATION
Post-Anesthesia Evaluation and Assessment    Patient: Susi Shen MRN: [id number]  SSN: [id number]    YOB: 1945  Age: 75 y.o. Sex: male       Cardiovascular Function/Vital Signs  Visit Vitals    /71    Pulse 79    Resp 24    Ht 6' (1.829 m)    Wt 90.7 kg (200 lb)    SpO2 95%    BMI 27.12 kg/m2       Patient is status post general, total IV anesthesia anesthesia for Procedure(s):  COLONOSCOPY  ESOPHAGOGASTRODUODENAL (EGD) BIOPSY  ESOPHAGOGASTRODUODENOSCOPY (EGD). Nausea/Vomiting: None    Postoperative hydration reviewed and adequate. Pain:  Pain Scale 1: Numeric (0 - 10) (06/05/18 1407)  Pain Intensity 1: 0 (06/05/18 1407)   Managed    Neurological Status: At baseline    Mental Status and Level of Consciousness: Arousable    Pulmonary Status:   O2 Device: Room air (06/05/18 1407)   Adequate oxygenation and airway patent    Complications related to anesthesia: None    Post-anesthesia assessment completed.  No concerns    Signed By: Joceline Santamaria MD     June 5, 2018

## 2018-06-05 NOTE — ANESTHESIA PREPROCEDURE EVALUATION
Anesthetic History   No history of anesthetic complications            Review of Systems / Medical History  Patient summary reviewed, nursing notes reviewed and pertinent labs reviewed    Pulmonary                Comments: Former smoker - Quit 2005 - 45 pack years   Neuro/Psych   Within defined limits          Comments: Lumbar spinal stenosis/Chronic lumbar pain with neurogenic claudication Cardiovascular    Hypertension: well controlled          Hyperlipidemia    Exercise tolerance: >4 METS     GI/Hepatic/Renal               Comments: Heme Positive Stool  GI Bleed Endo/Other    Diabetes: well controlled, type 2    Arthritis and anemia     Other Findings   Comments: Gout         Physical Exam    Airway  Mallampati: II  TM Distance: 4 - 6 cm  Neck ROM: normal range of motion   Mouth opening: Normal     Cardiovascular  Regular rate and rhythm,  S1 and S2 normal,  no murmur, click, rub, or gallop             Dental  No notable dental hx       Pulmonary  Breath sounds clear to auscultation               Abdominal  GI exam deferred       Other Findings            Anesthetic Plan    ASA: 3  Anesthesia type: general and total IV anesthesia          Induction: Intravenous  Anesthetic plan and risks discussed with: Patient

## 2018-06-05 NOTE — IP AVS SNAPSHOT
[address] [place] [address] 280 845 [facility] 
[phone] Patient: Meryle Margarita MRN: [id number] :1945 About your hospitalization You were admitted on:  2018 You last received care in the:  [facility] ENDOSCOPY You were discharged on:  2018 Why you were hospitalized Your primary diagnosis was:  Not on File Follow-up Information Follow up With Details Comments Contact Info Octavia Luque MD   [address] [address] [phone] Your Scheduled Appointments 2018  8:00 AM EDT  
ESTABLISHED PATIENT with Deni Morales MD  
0982 [facility] ([address] [place] [address]) [address] [address]  
[phone] Discharge Orders None A check jerardo indicates which time of day the medication should be taken. My Medications CONTINUE taking these medications Instructions Each Dose to Equal  
 Morning Noon Evening Bedtime  
 allopurinol 300 mg tablet Commonly known as:  Tiesha Salts Your last dose was: Your next dose is: Take 2 Tabs by mouth daily for 90 days. Indications: acute gouty arthritis 600 mg  
    
   
   
   
  
 amLODIPine 5 mg tablet Commonly known as:  Remonia Grates Your last dose was: Your next dose is: TAKE 1 TABLET BY MOUTH EVERY MORNING  
     
   
   
   
  
 aspirin 81 mg chewable tablet Your last dose was: Your next dose is: Take 1 Tab by mouth daily. 81 mg  
    
   
   
   
  
 atorvastatin 10 mg tablet Commonly known as:  LIPITOR Your last dose was: Your next dose is: TAKE 1 TABLET BY MOUTH ONCE A DAY  
     
   
   
   
  
 carvedilol 25 mg tablet Commonly known as:  Destinee Justin Your last dose was: Your next dose is: Take 1 Tab by mouth two (2) times daily (with meals). 25 mg  
    
   
   
   
  
 cholecalciferol 1,000 unit Cap Commonly known as:  VITAMIN D3 Your last dose was: Your next dose is: Take  by mouth daily. furosemide 20 mg tablet Commonly known as:  LASIX Your last dose was: Your next dose is: Take 1 Tab by mouth daily. 20 mg GAVILYTE-C 240-22.72-6.72 -5.84 gram solution Generic drug:  PEG 3350-Electrolytes Your last dose was: Your next dose is: TAKE AS DIRECTED  
     
   
   
   
  
 glipiZIDE SR 5 mg CR tablet Commonly known as:  GLUCOTROL XL Your last dose was: Your next dose is: Take 5 mg by mouth two (2) times a day. 5 mg  
    
   
   
   
  
 lisinopril 20 mg tablet Commonly known as:  Liseth Bills Your last dose was: Your next dose is: Take  by mouth daily. magnesium oxide 250 mg tablet Your last dose was: Your next dose is: TAKE 1 TABLET BY MOUTH AS NEEDED ONCE A DAY ORALLY 30 DAY(S)  
     
   
   
   
  
 naproxen 500 mg tablet Commonly known as:  NAPROSYN Your last dose was: Your next dose is: Take 1 Tab by mouth two (2) times daily (with meals) for 90 days. 500 mg  
    
   
   
   
  
 pantoprazole 40 mg tablet Commonly known as:  PROTONIX Your last dose was: Your next dose is: Take 40 mg by mouth daily. 40 mg  
    
   
   
   
  
 potassium chloride SR 10 mEq tablet Commonly known as:  KLOR-CON 10 Your last dose was: Your next dose is: Take 1 Tab by mouth daily. 10 mEq  
    
   
   
   
  
 tamsulosin 0.4 mg capsule Commonly known as:  FLOMAX Your last dose was: Your next dose is: Take 0.4 mg by mouth daily.   
 0.4 mg  
 VITAMIN B-12 1,000 mcg tablet Generic drug:  cyanocobalamin Your last dose was: Your next dose is: Take 1,000 mcg by mouth daily. 1000 mcg Discharge Instructions Surinder Reyes MD 
[facility], [address] [place] [address], [address] 
[phone] 
[email or web address] Susan Dias [id number] 1945 EGD DISCHARGE INSTRUCTIONS Discomfort: 
Sore throat- throat lozenges or warm salt water gargle 
redness at IV site- apply warm compress to area; if redness or soreness persist- contact your physician Gaseous discomfort- walking, belching will help relieve any discomfort You may not operate a vehicle for 12 hours You may not engage in an occupation involving machinery or appliances for rest of today You may not drink alcoholic beverages for at least 12 hours Avoid making any critical decisions for at least 24 hour DIET You may have anything by mouth. You may eat and drink immediately. You may resume your regular diet  however -  remember your colon is empty and a heavy meal will produce gas. Avoid these foods:  vegetables, fried / greasy foods, carbonated drinks ACTIVITY You may resume your normal daily activities Spend the remainder of the day resting -  avoid any strenuous activity. CALL M.D. ANY SIGN OF Increasing pain, nausea, vomiting Abdominal distension (swelling) New increased bleeding (oral or rectal) Fever (chills) Pain in chest area Bloody discharge from nose or mouth Shortness of breath Follow-up Instructions: 
 Call Dr. Surinder Reyes for any questions or problems. Telephone # [phone] Dr. Rae [facility] office will notify you of the biopsy results available  Within 7 to 10 days. We will call you or send a letter Continue same medications.  
 
ENDOSCOPY FINDINGS: 
 Your endoscopy showed just a hiatal hernia. DISCHARGE SUMMARY from Nurse The following personal items collected during your admission are returned to you:  
Dental Appliance: Dental Appliances: None Vision: Visual Aid: None Hearing Aid:   
Jewelry:   
Clothing:   
Other Valuables:   
Valuables sent to safe:    
COLON DISCHARGE INSTRUCTIONS Discomfort: 
Redness at IV site- apply warm compress to area; if redness or soreness persist- contact your physician There may be a slight amount of blood passed from the rectum Gaseous discomfort- walking, belching will help relieve any discomfort You may not operate a vehicle for 12 hours You may not engage in an occupation involving machinery or appliances for rest of today You may not drink alcoholic beverages for at least 12 hours Avoid making any critical decisions for at least 24 hour DIET: 
 High fiber diet.  however -  remember your colon is empty and a heavy meal will produce gas. Avoid these foods:  vegetables, fried / greasy foods, carbonated drinks for today ACTIVITY: 
You may resume your normal daily activities it is recommended that you spend the remainder of the day resting -  avoid any strenuous activity. CALL M.D. ANY SIGN OF: Increasing pain, nausea, vomiting Abdominal distension (swelling) New increased bleeding (oral or rectal) Fever (chills) Pain in chest area Bloody discharge from nose or mouth Shortness of breath COLONOSCOPY FINDINGS: 
Your colonoscopy showed: just some internal hemorrhoids. Follow-up Instructions: 
 Call Dr. Rainelle Kehr if any questions or problems. Telephone # [phone] Should have a repeat colonoscopy in 5 years. Introducing [facility]! [facility] introduces Say-Hey patient portal. Now you can access parts of your medical record, email your doctor's office, and request medication refills online.    
 
1. In your internet browser, go to [email or web address] 2. Click on the First Time User? Click Here link in the Sign In box. You will see the New Member Sign Up page. 3. Enter your TableGrabber Access Code exactly as it appears below. You will not need to use this code after youve completed the sign-up process. If you do not sign up before the expiration date, you must request a new code. · TableGrabber Access Code: [email or web address] Expires: 7/22/2018  9:00 AM 
 
4. Enter the last four digits of your Social Security Number (xxxx) and Date of Birth (mm/dd/yyyy) as indicated and click Submit. You will be taken to the next sign-up page. 5. Create a Accruitt ID. This will be your TableGrabber login ID and cannot be changed, so think of one that is secure and easy to remember. 6. Create a TableGrabber password. You can change your password at any time. 7. Enter your Password Reset Question and Answer. This can be used at a later time if you forget your password. 8. Enter your e-mail address. You will receive e-mail notification when new information is available in [address] 9. Click Sign Up. You can now view and download portions of your medical record. 10. Click the Download Summary menu link to download a portable copy of your medical information. If you have questions, please visit the Frequently Asked Questions section of the TableGrabber website. Remember, TableGrabber is NOT to be used for urgent needs. For medical emergencies, dial 911. Now available from your iPhone and Android! Introducing Cornell Ashley As a Jyl Oka patient, I wanted to make you aware of our electronic visit tool called Cornell Ashley. Nancy Almonte 24/7 allows you to connect within minutes with a medical provider 24 hours a day, seven days a week via a mobile device or tablet or logging into a secure website from your computer. You can access Cornell Ashley from anywhere in the [place]. A virtual visit might be right for you when you have a simple condition and feel like you just dont want to get out of bed, or cant get away from work for an appointment, when your regular [employer] provider is not available (evenings, weekends or holidays), or when youre out of town and need minor care. Electronic visits cost only $49 and if the [employer] 24/7 provider determines a prescription is needed to treat your condition, one can be electronically transmitted to a nearby pharmacy*. Please take a moment to enroll today if you have not already done so. The enrollment process is free and takes just a few minutes. To enroll, please download the [employer] 24/7 lela to your tablet or phone, or visit [email or web address] to enroll on your computer. And, as an [address] [place] [address] patient with a [employer] account, the results of your visits will be scanned into your electronic medical record and your primary care provider will be able to view the scanned results. We urge you to continue to see your regular [employer] provider for your ongoing medical care. And while your primary care provider may not be the one available when you seek a [employer] virtual visit, the peace of mind you get from getting a real diagnosis real time can be priceless. For more information on [employer], view our Frequently Asked Questions (FAQs) at [email or web address]. Sincerely, 
 
Refugio Sam MD 
Chief Medical Officer [facility] Aliya Anguiano *:  certain medications cannot be prescribed via Mompery Providers Seen During Your Hospitalization Provider Specialty Primary office phone Tran Colbert MD Gastroenterology [phone] Your Primary Care Physician (PCP) Primary Care Physician Office Phone Office Fax James Patel [phone] You are allergic to the following No active allergies Recent Documentation Height Weight BMI Smoking Status 1.829 m 90.7 kg 27.12 kg/m2 Former Smoker Emergency Contacts Name Discharge Info Relation Home Work Mobile Nelli Apple DISCHARGE CAREGIVER [3] Spouse [3] [phone] Judith Son DISCHARGE CAREGIVER [3] Daughter [21]   [phone] Patient Belongings The following personal items are in your possession at time of discharge: 
  Dental Appliances: None  Visual Aid: None Please provide this summary of care documentation to your next provider. Signatures-by signing, you are acknowledging that this After Visit Summary has been reviewed with you and you have received a copy. Patient Signature:  ____________________________________________________________ Date:  ____________________________________________________________  
  
[facility] Provider Signature:  ____________________________________________________________ Date:  ____________________________________________________________

## 2018-06-05 NOTE — H&P
Shar Pennington MD  [facility], [address] [place] [address]  [phone]  [email or web address]      Gastroenterology Outpatient History and Physical    Patient: Maynor Avelar    Physician: Debby Obrien MD    Vital Signs: Blood pressure (!) 172/93, pulse 76, resp. rate 18, height 6' (1.829 m), weight 90.7 kg (200 lb), SpO2 94 %. Allergies: No Known Allergies    Chief Complaint: heme pos    History of Present Illness: Heme positive stool and hx of iron deficient anemia.     History:  Past Medical History:   Diagnosis Date    Arthritis     Chronic back pain     Diabetes ([facility] [address]     Dyslipidemia     Gout     Hypertension     Left wrist fracture     Renal cancer [facility])       Past Surgical History:   Procedure Laterality Date    COLONOSCOPY,DIAGNOSTIC  3/24/2016         COLONOSCOPY,REMV LESN,SNARE  3/24/2016         HX ORTHOPAEDIC      Back and right knee surgery    HX ORTHOPAEDIC      carpal tunnel bilateral    RENAL SCOPE,W/RESECTION,TUMOR      UPPER GI ENDOSCOPY,BIOPSY  3/23/2016           Social History     Social History    Marital status:      Spouse name: N/A    Number of children: N/A    Years of education: N/A     Social History Main Topics    Smoking status: Former Smoker     Packs/day: 1.00     Years: 45.00     Quit date: 3/12/2005    Smokeless tobacco: Never Used    Alcohol use No      Comment: occasional    Drug use: No    Sexual activity: No     Other Topics Concern    None     Social History Narrative      Family History   Problem Relation Age of Onset    Breast Cancer Mother     Diabetes Mother     Prostate Cancer Father     Cancer Sister      lung    Cancer Brother       Patient Active Problem List   Diagnosis Code    Secondary iridocyclitis, 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 Authorizing Provider   SHEILA-C 365-99.47-5.81 -5.84 gram solution TAKE AS DIRECTED 4/17/18  Yes Historical Provider   cyanocobalamin (VITAMIN B-12) 1,000 mcg tablet Take 1,000 mcg by mouth daily. Yes Historical Provider   allopurinol (ZYLOPRIM) 300 mg tablet Take 2 Tabs by mouth daily for 90 days. Indications: acute gouty arthritis 4/23/18 7/22/18 Yes Mary Jacobo MD   naproxen (NAPROSYN) 500 mg tablet Take 1 Tab by mouth two (2) times daily (with meals) for 90 days. 4/23/18 7/22/18 Yes Mary Jacobo MD   amLODIPine (NORVASC) 5 mg tablet TAKE 1 TABLET BY MOUTH EVERY MORNING 11/21/17  Yes Historical Provider   magnesium oxide 250 mg tablet TAKE 1 TABLET BY MOUTH AS NEEDED ONCE A DAY ORALLY 30 DAY(S) 4/21/17  Yes Historical Provider   aspirin 81 mg chewable tablet Take 1 Tab by mouth daily. 4/17/17  Yes Kennedy Joseph MD   carvedilol (COREG) 25 mg tablet Take 1 Tab by mouth two (2) times daily (with meals). 4/17/17  Yes Kennedy Joseph MD   potassium chloride SR (KLOR-CON 10) 10 mEq tablet Take 1 Tab by mouth daily. 4/17/17  Yes Kennedy Joseph MD   furosemide (LASIX) 20 mg tablet Take 1 Tab by mouth daily. 4/17/17  Yes Kennedy Joseph MD   cholecalciferol (VITAMIN D3) 1,000 unit cap Take  by mouth daily. Yes Historical Provider   glipiZIDE SR (GLUCOTROL XL) 5 mg CR tablet Take 5 mg by mouth two (2) times a day. 12/26/16  Yes Historical Provider   pantoprazole (PROTONIX) 40 mg tablet Take 40 mg by mouth daily.    Yes Historical Provider   atorvastatin (LIPITOR) 10 mg tablet TAKE 1 TABLET BY MOUTH ONCE A DAY 8/31/16  Yes Historical Provider   tamsulosin (FLOMAX) 0.4 mg capsule Take 0.4 mg by mouth daily. Yes Phys Other, MD   lisinopril (PRINIVIL, ZESTRIL) 20 mg tablet Take  by mouth daily.     Historical Provider       Physical Exam:     General: well developed, well nourished   HEENT: unremarkable   Heart: regular rhythm no mumur    Lungs: clear   Abdominal:  benign   Neurological: unremarkable   Extremities: no edema     Findings/Diagnosis: heme positive stool, anemia    Plan of Care/Planned Procedure: EGD and colonoscopy with  monitored anesthesia care sedation       Signed:  Ray Harley MD 6/5/2018

## 2018-06-05 NOTE — PROCEDURES
[facility]                  Colonoscopy Operative Report    6/5/2018      Gillian Roth  [id number]  1945    Procedure Type:   Colonoscopy --diagnostic     Indications:    Heme positive, iron deficient     Pre-operative Diagnosis: see indication above    Post-operative Diagnosis:  See findings below    :  Ramone Franklin MD    Referring Provider: Italia Sanchez MD      Sedation:  MAC anesthesia Propofol    Pre-Procedural Exam:      Airway: clear,  No airway problems anticipated  Heart: RRR, without gallops or rubs  Lungs: clear bilaterally without wheezes, crackles, or rhonchi  Abdomen: soft, nontender, nondistended, bowel sounds present  Mental Status: awake, alert and oriented to person, place and time     Procedure Details:  After informed consent was obtained with all risks and benefits of procedure explained and preoperative exam completed, the patient was taken to the endoscopy suite and placed in the left lateral decubitus position. Upon sequential sedation as per above, a digital rectal exam was performed . The Olympus videocolonoscope  was inserted in the rectum and carefully advanced to the cecum, which was identified by the ileocecal valve and appendiceal orifice. The cecum was identified by the ileocecal valve and appendiceal orifice. The quality of preparation was good. The colonoscope was slowly withdrawn with careful evaluation between folds. Retroflexion in the rectum was completed demonstrating internal hemorrhoids. Findings:   Rectum: Grade 1 internal hemorrhoid(s); Sigmoid: normal  Descending Colon: normal  Transverse Colon: normal  Ascending Colon: normal  Cecum: normal  Terminal Ileum: not intubated      Specimen Removed:  none    Complications: None. EBL:  None. Impression:    normal colonic mucosa throughout  hemorrhoids internal, Moderate in size    Recommendations: --Repeat colonoscopy in 5 years. High fiber diet.   Resume normal medication(s). Discharge Disposition:  Home in the company of a  when able to ambulate. Rod Batres MD    6/5/2018     MAMIE Emerson MD  [facility], [address] [place] [address] Ana Lilia Min [address] [place] [address]  [phone]  [email or web address]

## 2018-06-05 NOTE — DISCHARGE INSTRUCTIONS
Surinder Reyes MD  [facility], [address] [place] [address], [address] [facility]  [phone]  [email or web address]  [id number]  1945    EGD DISCHARGE INSTRUCTIONS    Discomfort:  Sore throat- throat lozenges or warm salt water gargle  redness at IV site- apply warm compress to area; if redness or soreness persist- contact your physician  Gaseous discomfort- walking, belching will help relieve any discomfort  You may not operate a vehicle for 12 hours  You may not engage in an occupation involving machinery or appliances for rest of today  You may not drink alcoholic beverages for at least 12 hours  Avoid making any critical decisions for at least 24 hour  DIET  You may have anything by mouth. You may eat and drink immediately. You may resume your regular diet - however -  remember your colon is empty and a heavy meal will produce gas. Avoid these foods:  vegetables, fried / greasy foods, carbonated drinks      ACTIVITY  You may resume your normal daily activities   Spend the remainder of the day resting -  avoid any strenuous activity. CALL M.D. ANY SIGN OF   Increasing pain, nausea, vomiting  Abdominal distension (swelling)  New increased bleeding (oral or rectal)  Fever (chills)  Pain in chest area  Bloody discharge from nose or mouth  Shortness of breath    Follow-up Instructions:   Call Dr. Surinder Reyes for any questions or problems. Telephone # [phone]  Dr. Rae [facility] office will notify you of the biopsy results available  Within 7 to 10 days. We will call you or send a letter   Continue same medications. ENDOSCOPY FINDINGS:   Your endoscopy showed just a hiatal hernia.       DISCHARGE SUMMARY from Nurse    The following personal items collected during your admission are returned to you:   Dental Appliance: Dental Appliances: None  Vision: Visual Aid: None  Hearing Aid:    Jewelry:    Clothing:    Other Valuables: Valuables sent to safe:     COLON DISCHARGE INSTRUCTIONS  Discomfort:  Redness at IV site- apply warm compress to area; if redness or soreness persist- contact your physician  There may be a slight amount of blood passed from the rectum  Gaseous discomfort- walking, belching will help relieve any discomfort  You may not operate a vehicle for 12 hours  You may not engage in an occupation involving machinery or appliances for rest of today  You may not drink alcoholic beverages for at least 12 hours  Avoid making any critical decisions for at least 24 hour  DIET:   High fiber diet. - however -  remember your colon is empty and a heavy meal will produce gas. Avoid these foods:  vegetables, fried / greasy foods, carbonated drinks for today      ACTIVITY:  You may resume your normal daily activities it is recommended that you spend the remainder of the day resting -  avoid any strenuous activity. CALL M.D. ANY SIGN OF:   Increasing pain, nausea, vomiting  Abdominal distension (swelling)  New increased bleeding (oral or rectal)  Fever (chills)  Pain in chest area  Bloody discharge from nose or mouth  Shortness of breath     COLONOSCOPY FINDINGS:  Your colonoscopy showed: just some internal hemorrhoids. Follow-up Instructions:   Call Dr. Salma Live if any questions or problems. Telephone # [phone]    Should have a repeat colonoscopy in 5 years.

## 2018-07-23 ENCOUNTER — OFFICE VISIT (OUTPATIENT)
Dept: RHEUMATOLOGY | Age: 73
End: 2018-07-23

## 2018-07-23 VITALS
DIASTOLIC BLOOD PRESSURE: 73 MMHG | HEIGHT: 72 IN | TEMPERATURE: 98 F | HEART RATE: 78 BPM | RESPIRATION RATE: 20 BRPM | OXYGEN SATURATION: 94 % | BODY MASS INDEX: 27.79 KG/M2 | SYSTOLIC BLOOD PRESSURE: 135 MMHG | WEIGHT: 205.2 LBS

## 2018-07-23 DIAGNOSIS — M10.9 GOUT, ARTHRITIS: Primary | ICD-10-CM

## 2018-07-23 DIAGNOSIS — M10.041 ACUTE IDIOPATHIC GOUT OF RIGHT HAND: ICD-10-CM

## 2018-07-23 DIAGNOSIS — M10.9 GOUTY ARTHRITIS: ICD-10-CM

## 2018-07-23 RX ORDER — ASPIRIN 81 MG/1
TABLET ORAL
Refills: 3 | COMMUNITY
Start: 2018-05-19 | End: 2018-07-23 | Stop reason: SDUPTHER

## 2018-07-23 RX ORDER — ALLOPURINOL 300 MG/1
600 TABLET ORAL DAILY
Qty: 180 TAB | Refills: 4 | Status: ON HOLD | OUTPATIENT
Start: 2018-07-23 | End: 2019-01-07 | Stop reason: SDUPTHER

## 2018-07-23 RX ORDER — POTASSIUM CHLORIDE 750 MG/1
CAPSULE, EXTENDED RELEASE ORAL
COMMUNITY
Start: 2018-07-22 | End: 2019-01-03 | Stop reason: SDUPTHER

## 2018-07-23 RX ORDER — PREDNISONE 5 MG/1
TABLET ORAL
Qty: 30 TAB | Refills: 1 | Status: SHIPPED | OUTPATIENT
Start: 2018-07-23 | End: 2019-01-03

## 2018-07-23 RX ORDER — PREDNISONE 5 MG/1
TABLET ORAL
Refills: 3 | COMMUNITY
Start: 2018-05-15 | End: 2019-01-03

## 2018-07-23 NOTE — PROGRESS NOTES
RHEUMATOLOGY PROBLEM LIST AND CHIEF COMPLAINT  1. Gout - crystal-proven (9/2016), diagnosed 3 years ago; most recent uric acid (4.6)  2. CPPD    INTERVAL HISTORY  This is a 75 y.o.  male. Today, the patient complains of no pain in the joints. Location: NA  Severity:  0 on a scale of 0-10  Timing: all day   Duration:  2 months  Context/Associated signs and symptoms: Patient states he is doing well. He complains of some hand stiffness that resolves with activity. He continues with Allopurinol 600 mg daily. He has weaned off Naproxen 500 mg. RHEUMATOLOGY REVIEW OF SYSTEMS   Positives as per history  Negatives as follows:  Nafisa Garden:  Denies unexplained persistent fevers or weight change  RESPIRATORY:  No pleuritic pain, exertional dyspnea  CARDIOVASCULAR:  Denies chest pain  GASTRO:   Denies heartburn, abdominal pain, nausea, vomiting, diarrhea  SKIN:    Denies rash   MSK:    No morning stiffness >1 hour, persistent joint swelling, persistent joint pain    PAST MEDICAL HISTORY  Reviewed with patient, significant changes in medical history - no    FAMILY HISTORY   No autoimmune disease in the family    MEDICATIONS  All the current medications were reviewed in detail. PHYSICAL EXAM  Height 6' (1.829 m). GENERAL APPEARANCE: Well-nourished adult in no acute distress. EYES: No scleral erythema, conjunctival injection. ENT: No oral ulcer, parotid enlargement. NECK: No adenopathy, thyroid enlargement. CARDIOVASCULAR: Heart rhythm is regular. No murmur, rub, gallop. CHEST: Normal vesicular breath sounds. No wheezes, rales, pleural friction rubs. ABDOMINAL: The abdomen is soft and nontender. Liver and spleen are nonpalpable. Bowel sounds are normal.  EXTREMITIES: There is no evidence of clubbing, cyanosis, edema. SKIN: No rash, palpable purpura, digital ulcer, abnormal thickening.   NEUROLOGICAL: Normal gait and station, full strength in upper and lower extremities, normal sensation to light touch. MUSCULOSKELETAL:   Upper extremities - OA changes noted. B/L Hands mild dROM, no synovitis. Lower extremities - full range of motion, no tenderness, no swelling, no synovial thickening and no deformity of joints     LABS, RADIOLOGY AND PROCEDURES   Previous labs reviewed -Yes    Uric Acid  12/2017 - 5.2  1/2018 - 4.6  4/2018 - 3.0    ASSESSMENT  1. Gout (Established problem -  Very good partial response) - The patient's gout is well under control. Last Uric acid was 3.0. CPPD was noted on his hand XR, which could lead to possible pseudo-gout flare. I explained symptoms of pseudo-gout flare, and will order prednisone 12-day pack PRN. He has stopped Naproxen 500 mg successfully. He should continue with allopurinol 500 mg daily. He states he has an appointment with a \"kidney specialist in 2 weeks\". For now, he should return in 6 months for a follow up. I will check labs today. PLAN  1. Allopurinol 600 mg daily  2. CBC, CMP, Uric Acid  3. Return in 6 months    Linh Cruz MD  Adult and Pediatric Rheumatology     [facility] Arthritis and [address] [facility], [address] [place] [address], Phone [phone], Fax [phone]   E-mail: [email or web address]    Visiting  of Pediatrics    Department of Pediatrics, [facility] of [address] [place] [address], [address] [place] [address], Phone [phone], Fax [phone]  E-mail: [email or web address]    There are no Patient Instructions on file for this visit. cc:  MD Lora Bentleyu-Leydi    Written by sky Wilson, as dictated by Bernard Dillard.  Elza Cruz M.D.

## 2018-07-23 NOTE — MR AVS SNAPSHOT
[address] 
[phone] Patient: Michael Rashid MRN: [id number] Visit Information Date & Time Provider Department Dept. Phone Encounter #  
 2018  8:00 AM Missael Escobedo MD [address] [phone] [id number] Follow-up Instructions Return in about 6 months (around 2019). Upcoming Health Maintenance Date Due FOBT Q 1 YEAR AGE 50-75 1994 ZOSTER VACCINE AGE 60> 3/27/2004 GLAUCOMA SCREENING Q2Y 2009 Pneumococcal 65+ Low/Medium Risk (1 of 2 - PCV13) 2009 DTaP/Tdap/Td series (1 - Tdap) 3/8/2017 MEDICARE YEARLY EXAM 3/14/2018 Influenza Age 5 to Adult 2018 Allergies as of 2018  Review Complete On: 2018 By: Missael Escobedo MD  
 No Known Allergies Current Immunizations  Reviewed on 2017 Name Date  
 TD Vaccine 2011  8:04 PM  
 Td, Adsorbed 3/7/2017 12:47 PM  
  
 Not reviewed this visit You Were Diagnosed With   
  
 Codes Comments Gout, arthritis    -  Primary ICD-10-CM: M10.9 ICD-9-CM: 274.00 Vitals BP Pulse Temp Resp Height(growth percentile) Weight(growth percentile) 135/73 (BP 1 Location: Left arm, BP Patient Position: Sitting) 78 98 °F (36.7 °C) (Oral) 20 6' (1.829 m) 205 lb 3.2 oz (93.1 kg) SpO2 BMI Smoking Status 94% 27.83 kg/m2 Former Smoker Vitals History BMI and BSA Data Body Mass Index Body Surface Area  
 27.83 kg/m 2 2.17 m 2 Preferred Pharmacy Pharmacy Name Phone [facility], [address] [place] [address] AT [address] [phone] Your Updated Medication List  
  
   
This list is accurate as of 18  8:40 AM.  Always use your most recent med list.  
  
  
  
  
 allopurinol 300 mg tablet Commonly known as:  Ardie Fleischer  
 Take 2 Tabs by mouth daily. Indications: acute gouty arthritis  
  
 amLODIPine 5 mg tablet Commonly known as:  Siomara Fagans TAKE 1 TABLET BY MOUTH EVERY MORNING  
  
 aspirin 81 mg chewable tablet Take 1 Tab by mouth daily. atorvastatin 10 mg tablet Commonly known as:  LIPITOR  
TAKE 1 TABLET BY MOUTH ONCE A DAY  
  
 carvedilol 25 mg tablet Commonly known as:  Gillermina Begun Take 1 Tab by mouth two (2) times daily (with meals). cholecalciferol 1,000 unit Cap Commonly known as:  VITAMIN D3 Take  by mouth daily. furosemide 20 mg tablet Commonly known as:  LASIX Take 1 Tab by mouth daily. GAVILYTE-C 240-22.72-6.72 -5.84 gram solution Generic drug:  PEG 3350-Electrolytes TAKE AS DIRECTED  
  
 glipiZIDE SR 5 mg CR tablet Commonly known as:  GLUCOTROL XL Take 5 mg by mouth two (2) times a day. lisinopril 20 mg tablet Commonly known as:  Yuri Hu Take  by mouth daily. magnesium oxide 250 mg tablet TAKE 1 TABLET BY MOUTH AS NEEDED ONCE A DAY ORALLY 30 DAY(S)  
  
 pantoprazole 40 mg tablet Commonly known as:  PROTONIX Take 40 mg by mouth daily. * potassium chloride SR 10 mEq tablet Commonly known as:  KLOR-CON 10 Take 1 Tab by mouth daily. * potassium chloride SA 10 mEq capsule Commonly known as:  MICRO-K  
  
 * predniSONE 5 mg tablet Commonly known as:  DELTASONE  
TK 1 T PO BID * predniSONE 5 mg tablet Commonly known as:  DELTASONE  
20mg x 3 days, 15mg x 3 days, 10mg x 3 days, 5mg x 3 days  
  
 tamsulosin 0.4 mg capsule Commonly known as:  FLOMAX Take 0.4 mg by mouth daily. VITAMIN B-12 1,000 mcg tablet Generic drug:  cyanocobalamin Take 1,000 mcg by mouth daily. * Notice: This list has 4 medication(s) that are the same as other medications prescribed for you. Read the directions carefully, and ask your doctor or other care provider to review them with you. Prescriptions Sent to Pharmacy Refills  
 allopurinol (ZYLOPRIM) 300 mg tablet 4 Sig: Take 2 Tabs by mouth daily. Indications: acute gouty arthritis Class: Normal  
 Pharmacy: [facility]  AT [address] Ph #: [phone] Route: Oral  
 predniSONE (DELTASONE) 5 mg tablet 1 Simg x 3 days, 15mg x 3 days, 10mg x 3 days, 5mg x 3 days Class: Normal  
 Pharmacy: [facility]  AT [address] Ph #: [phone] We Performed the Following CBC+PLATELET+HEM REVIEW [20126 CPT(R)] METABOLIC PANEL, COMPREHENSIVE [72665 CPT(R)] URIC ACID [id number] CPT(R)] Follow-up Instructions Return in about 6 months (around 2019). Introducing [facility]! [facility] introduces [email or web address] patient portal. Now you can access parts of your medical record, email your doctor's office, and request medication refills online. 1. In your internet browser, go to [email or web address] 2. Click on the First Time User? Click Here link in the Sign In box. You will see the New Member Sign Up page. 3. Enter your [email or web address] Access Code exactly as it appears below. You will not need to use this code after youve completed the sign-up process. If you do not sign up before the expiration date, you must request a new code. · [email or web address] Access Code: [email or web address] Expires: 10/21/2018  8:40 AM 
 
4. Enter the last four digits of your Social Security Number (xxxx) and Date of Birth (mm/dd/yyyy) as indicated and click Submit. You will be taken to the next sign-up page. 5. Create a PlanetTrant ID. This will be your PlanetTrant login ID and cannot be changed, so think of one that is secure and easy to remember. 6. Create a [employer]ant password. You can change your password at any time. 7. Enter your Password Reset Question and Answer. This can be used at a later time if you forget your password. 8. Enter your e-mail address. You will receive e-mail notification when new information is available in [address] 9. Click Sign Up. You can now view and download portions of your medical record. 10. Click the Download Summary menu link to download a portable copy of your medical information. If you have questions, please visit the Frequently Asked Questions section of the [employer] website. Remember, BillGuard is NOT to be used for urgent needs. For medical emergencies, dial 911. Now available from your iPhone and Android! Please provide this summary of care documentation to your next provider. Your primary care clinician is listed as Cy Johnson. If you have any questions after today's visit, please call [phone].

## 2018-07-25 LAB
ALBUMIN SERPL-MCNC: 3.7 G/DL (ref 3.5–4.8)
ALBUMIN/GLOB SERPL: 1.5 {RATIO} (ref 1.2–2.2)
ALP SERPL-CCNC: 81 IU/L (ref 39–117)
ALT SERPL-CCNC: 15 IU/L (ref 0–44)
AST SERPL-CCNC: 19 IU/L (ref 0–40)
BASOPHILS # BLD MANUAL: 0 X10E3/UL (ref 0–0.2)
BASOPHILS NFR BLD MANUAL: 0 %
BILIRUB SERPL-MCNC: <0.2 MG/DL (ref 0–1.2)
BUN SERPL-MCNC: 15 MG/DL (ref 8–27)
BUN/CREAT SERPL: 11 (ref 10–24)
CALCIUM SERPL-MCNC: 8.8 MG/DL (ref 8.6–10.2)
CHLORIDE SERPL-SCNC: 107 MMOL/L (ref 96–106)
CO2 SERPL-SCNC: 20 MMOL/L (ref 20–29)
CREAT SERPL-MCNC: 1.31 MG/DL (ref 0.76–1.27)
DIFFERENTIAL COMMENT, 115260: ABNORMAL
EOSINOPHIL # BLD MANUAL: 0.1 X10E3/UL (ref 0–0.4)
EOSINOPHIL NFR BLD MANUAL: 3 %
ERYTHROCYTE [DISTWIDTH] IN BLOOD BY AUTOMATED COUNT: 16.2 % (ref 12.3–15.4)
GLOBULIN SER CALC-MCNC: 2.4 G/DL (ref 1.5–4.5)
GLUCOSE SERPL-MCNC: 146 MG/DL (ref 65–99)
HCT VFR BLD AUTO: 36.2 % (ref 37.5–51)
HGB BLD-MCNC: 11.3 G/DL (ref 13–17.7)
LYMPHOCYTES # BLD MANUAL: 1.1 X10E3/UL (ref 0.7–3.1)
LYMPHOCYTES NFR BLD MANUAL: 22 %
MCH RBC QN AUTO: 29.5 PG (ref 26.6–33)
MCHC RBC AUTO-ENTMCNC: 31.2 G/DL (ref 31.5–35.7)
MCV RBC AUTO: 95 FL (ref 79–97)
MONOCYTES # BLD MANUAL: 0.3 X10E3/UL (ref 0.1–0.9)
MONOCYTES NFR BLD MANUAL: 7 %
NEUTROPHILS # BLD MANUAL: 3.3 X10E3/UL (ref 1.4–7)
NEUTROPHILS NFR BLD MANUAL: 68 %
PLATELET # BLD AUTO: 151 X10E3/UL (ref 150–379)
PLATELET BLD QL SMEAR: ADEQUATE
POTASSIUM SERPL-SCNC: 4.3 MMOL/L (ref 3.5–5.2)
PROT SERPL-MCNC: 6.1 G/DL (ref 6–8.5)
RBC # BLD AUTO: 3.83 X10E6/UL (ref 4.14–5.8)
RBC MORPH BLD: ABNORMAL
SODIUM SERPL-SCNC: 144 MMOL/L (ref 134–144)
URATE SERPL-MCNC: 2.7 MG/DL (ref 3.7–8.6)
WBC # BLD AUTO: 4.8 X10E3/UL (ref 3.4–10.8)

## 2018-09-10 ENCOUNTER — HOSPITAL ENCOUNTER (OUTPATIENT)
Dept: GENERAL RADIOLOGY | Age: 73
Discharge: HOME | End: 2018-09-10
Payer: MEDICARE

## 2018-09-10 DIAGNOSIS — M54.2 CERVICAL PAIN: ICD-10-CM

## 2018-09-10 PROCEDURE — 72050 X-RAY EXAM NECK SPINE 4/5VWS: CPT

## 2018-12-14 ENCOUNTER — HOSPITAL ENCOUNTER (OUTPATIENT)
Dept: MRI IMAGING | Age: 73
Discharge: HOME | End: 2018-12-14
Attending: ORTHOPAEDIC SURGERY
Payer: MEDICARE

## 2018-12-14 DIAGNOSIS — M54.41 CHRONIC RIGHT-SIDED LOW BACK PAIN WITH BILATERAL SCIATICA: ICD-10-CM

## 2018-12-14 DIAGNOSIS — M54.42 CHRONIC RIGHT-SIDED LOW BACK PAIN WITH BILATERAL SCIATICA: ICD-10-CM

## 2018-12-14 DIAGNOSIS — G89.29 CHRONIC RIGHT-SIDED LOW BACK PAIN WITH BILATERAL SCIATICA: ICD-10-CM

## 2018-12-14 PROCEDURE — 72158 MRI LUMBAR SPINE W/O & W/DYE: CPT

## 2018-12-14 PROCEDURE — 74011250636 HC RX REV CODE- 250/636: Performed by: ORTHOPAEDIC SURGERY

## 2018-12-14 PROCEDURE — A9575 INJ GADOTERATE MEGLUMI 0.1ML: HCPCS | Performed by: ORTHOPAEDIC SURGERY

## 2018-12-14 RX ORDER — GADOTERATE MEGLUMINE 376.9 MG/ML
20 INJECTION INTRAVENOUS
Status: COMPLETED | OUTPATIENT
Start: 2018-12-14 | End: 2018-12-14

## 2018-12-14 RX ADMIN — GADOTERATE MEGLUMINE 20 ML: 376.9 INJECTION INTRAVENOUS at 12:00

## 2018-12-26 ENCOUNTER — HOSPITAL ENCOUNTER (OUTPATIENT)
Dept: GENERAL RADIOLOGY | Age: 73
Discharge: HOME | End: 2018-12-26
Payer: MEDICARE

## 2018-12-26 DIAGNOSIS — R05.9 COUGH: ICD-10-CM

## 2018-12-26 PROCEDURE — 71046 X-RAY EXAM CHEST 2 VIEWS: CPT

## 2019-01-02 ENCOUNTER — APPOINTMENT (OUTPATIENT)
Dept: CT IMAGING | Age: 74
DRG: 372 | End: 2019-01-02
Attending: EMERGENCY MEDICINE
Payer: MEDICARE

## 2019-01-02 ENCOUNTER — APPOINTMENT (OUTPATIENT)
Dept: ULTRASOUND IMAGING | Age: 74
DRG: 372 | End: 2019-01-02
Attending: PHYSICIAN ASSISTANT
Payer: MEDICARE

## 2019-01-02 ENCOUNTER — HOSPITAL ENCOUNTER (INPATIENT)
Age: 74
LOS: 5 days | Discharge: HOME HEALTH | DRG: 372 | End: 2019-01-07
Attending: EMERGENCY MEDICINE | Admitting: INTERNAL MEDICINE
Payer: MEDICARE

## 2019-01-02 ENCOUNTER — APPOINTMENT (OUTPATIENT)
Dept: GENERAL RADIOLOGY | Age: 74
DRG: 372 | End: 2019-01-02
Attending: PHYSICIAN ASSISTANT
Payer: MEDICARE

## 2019-01-02 DIAGNOSIS — N17.9 ACUTE KIDNEY INJURY (HCC): Primary | ICD-10-CM

## 2019-01-02 DIAGNOSIS — R19.7 DIARRHEA, UNSPECIFIED TYPE: ICD-10-CM

## 2019-01-02 LAB
ALBUMIN SERPL-MCNC: 2.9 G/DL (ref 3.5–5)
ALBUMIN/GLOB SERPL: 0.7 {RATIO} (ref 1.1–2.2)
ALP SERPL-CCNC: 66 U/L (ref 45–117)
ALT SERPL-CCNC: 14 U/L (ref 12–78)
ANION GAP BLD CALC-SCNC: 21 MMOL/L (ref 10–20)
ANION GAP SERPL CALC-SCNC: 10 MMOL/L (ref 5–15)
APPEARANCE UR: ABNORMAL
AST SERPL-CCNC: 10 U/L (ref 15–37)
BACTERIA URNS QL MICRO: ABNORMAL /HPF
BASOPHILS # BLD: 0 K/UL (ref 0–0.1)
BASOPHILS NFR BLD: 0 % (ref 0–1)
BILIRUB SERPL-MCNC: 0.4 MG/DL (ref 0.2–1)
BILIRUB UR QL CFM: NEGATIVE
BUN BLD-MCNC: 43 MG/DL (ref 9–20)
BUN SERPL-MCNC: 54 MG/DL (ref 6–20)
BUN/CREAT SERPL: 14 (ref 12–20)
CA-I BLD-MCNC: 1.06 MMOL/L (ref 1.12–1.32)
CALCIUM SERPL-MCNC: 7.9 MG/DL (ref 8.5–10.1)
CHLORIDE BLD-SCNC: 108 MMOL/L (ref 98–107)
CHLORIDE SERPL-SCNC: 108 MMOL/L (ref 97–108)
CO2 BLD-SCNC: 18 MMOL/L (ref 21–32)
CO2 SERPL-SCNC: 21 MMOL/L (ref 21–32)
COLOR UR: ABNORMAL
COMMENT, HOLDF: NORMAL
CREAT BLD-MCNC: 3.4 MG/DL (ref 0.6–1.3)
CREAT SERPL-MCNC: 3.79 MG/DL (ref 0.7–1.3)
DIFFERENTIAL METHOD BLD: ABNORMAL
EOSINOPHIL # BLD: 0.1 K/UL (ref 0–0.4)
EOSINOPHIL NFR BLD: 2 % (ref 0–7)
EPITH CASTS URNS QL MICRO: ABNORMAL /LPF
ERYTHROCYTE [DISTWIDTH] IN BLOOD BY AUTOMATED COUNT: 14.9 % (ref 11.5–14.5)
GLOBULIN SER CALC-MCNC: 4.2 G/DL (ref 2–4)
GLUCOSE BLD STRIP.AUTO-MCNC: 120 MG/DL (ref 65–100)
GLUCOSE BLD STRIP.AUTO-MCNC: 87 MG/DL (ref 65–100)
GLUCOSE BLD STRIP.AUTO-MCNC: 90 MG/DL (ref 65–100)
GLUCOSE BLD-MCNC: 66 MG/DL (ref 65–100)
GLUCOSE SERPL-MCNC: 82 MG/DL (ref 65–100)
GLUCOSE UR STRIP.AUTO-MCNC: NEGATIVE MG/DL
HCT VFR BLD AUTO: 38.3 % (ref 36.6–50.3)
HCT VFR BLD CALC: 29 % (ref 36.6–50.3)
HGB BLD-MCNC: 11.7 G/DL (ref 12.1–17)
HGB UR QL STRIP: NEGATIVE
HYALINE CASTS URNS QL MICRO: ABNORMAL /LPF (ref 0–5)
IMM GRANULOCYTES # BLD: 0 K/UL (ref 0–0.04)
IMM GRANULOCYTES NFR BLD AUTO: 0 % (ref 0–0.5)
KETONES UR QL STRIP.AUTO: ABNORMAL MG/DL
LEUKOCYTE ESTERASE UR QL STRIP.AUTO: ABNORMAL
LYMPHOCYTES # BLD: 1.4 K/UL (ref 0.8–3.5)
LYMPHOCYTES NFR BLD: 20 % (ref 12–49)
MCH RBC QN AUTO: 30.8 PG (ref 26–34)
MCHC RBC AUTO-ENTMCNC: 30.5 G/DL (ref 30–36.5)
MCV RBC AUTO: 100.8 FL (ref 80–99)
MONOCYTES # BLD: 1.2 K/UL (ref 0–1)
MONOCYTES NFR BLD: 17 % (ref 5–13)
MUCOUS THREADS URNS QL MICRO: ABNORMAL /LPF
NEUTS SEG # BLD: 4.5 K/UL (ref 1.8–8)
NEUTS SEG NFR BLD: 62 % (ref 32–75)
NITRITE UR QL STRIP.AUTO: NEGATIVE
NRBC # BLD: 0 K/UL (ref 0–0.01)
NRBC BLD-RTO: 0 PER 100 WBC
PH UR STRIP: 5 [PH] (ref 5–8)
PLATELET # BLD AUTO: 193 K/UL (ref 150–400)
PMV BLD AUTO: 12.1 FL (ref 8.9–12.9)
POTASSIUM BLD-SCNC: 3.9 MMOL/L (ref 3.5–5.1)
POTASSIUM SERPL-SCNC: 4.4 MMOL/L (ref 3.5–5.1)
PROT SERPL-MCNC: 7.1 G/DL (ref 6.4–8.2)
PROT UR STRIP-MCNC: 30 MG/DL
RBC # BLD AUTO: 3.8 M/UL (ref 4.1–5.7)
RBC #/AREA URNS HPF: ABNORMAL /HPF (ref 0–5)
SAMPLES BEING HELD,HOLD: NORMAL
SERVICE CMNT-IMP: ABNORMAL
SERVICE CMNT-IMP: ABNORMAL
SERVICE CMNT-IMP: NORMAL
SERVICE CMNT-IMP: NORMAL
SODIUM BLD-SCNC: 142 MMOL/L (ref 136–145)
SODIUM SERPL-SCNC: 139 MMOL/L (ref 136–145)
SP GR UR REFRACTOMETRY: 1.02 (ref 1–1.03)
UA: UC IF INDICATED,UAUC: ABNORMAL
UROBILINOGEN UR QL STRIP.AUTO: 0.2 EU/DL (ref 0.2–1)
WBC # BLD AUTO: 7.4 K/UL (ref 4.1–11.1)
WBC URNS QL MICRO: ABNORMAL /HPF (ref 0–4)

## 2019-01-02 PROCEDURE — 81001 URINALYSIS AUTO W/SCOPE: CPT

## 2019-01-02 PROCEDURE — 71046 X-RAY EXAM CHEST 2 VIEWS: CPT

## 2019-01-02 PROCEDURE — 80053 COMPREHEN METABOLIC PANEL: CPT

## 2019-01-02 PROCEDURE — 96361 HYDRATE IV INFUSION ADD-ON: CPT

## 2019-01-02 PROCEDURE — 74176 CT ABD & PELVIS W/O CONTRAST: CPT

## 2019-01-02 PROCEDURE — 76770 US EXAM ABDO BACK WALL COMP: CPT

## 2019-01-02 PROCEDURE — 87086 URINE CULTURE/COLONY COUNT: CPT

## 2019-01-02 PROCEDURE — 82962 GLUCOSE BLOOD TEST: CPT

## 2019-01-02 PROCEDURE — 85025 COMPLETE CBC W/AUTO DIFF WBC: CPT

## 2019-01-02 PROCEDURE — 74011250636 HC RX REV CODE- 250/636: Performed by: PHYSICIAN ASSISTANT

## 2019-01-02 PROCEDURE — 74011250637 HC RX REV CODE- 250/637: Performed by: INTERNAL MEDICINE

## 2019-01-02 PROCEDURE — 87077 CULTURE AEROBIC IDENTIFY: CPT

## 2019-01-02 PROCEDURE — 87186 SC STD MICRODIL/AGAR DIL: CPT

## 2019-01-02 PROCEDURE — 80047 BASIC METABLC PNL IONIZED CA: CPT

## 2019-01-02 PROCEDURE — 74011250636 HC RX REV CODE- 250/636: Performed by: EMERGENCY MEDICINE

## 2019-01-02 PROCEDURE — 99283 EMERGENCY DEPT VISIT LOW MDM: CPT

## 2019-01-02 PROCEDURE — 96360 HYDRATION IV INFUSION INIT: CPT

## 2019-01-02 PROCEDURE — 36415 COLL VENOUS BLD VENIPUNCTURE: CPT

## 2019-01-02 PROCEDURE — 65270000029 HC RM PRIVATE

## 2019-01-02 PROCEDURE — 74011250636 HC RX REV CODE- 250/636: Performed by: INTERNAL MEDICINE

## 2019-01-02 RX ORDER — PANTOPRAZOLE SODIUM 40 MG/1
40 TABLET, DELAYED RELEASE ORAL
Status: DISCONTINUED | OUTPATIENT
Start: 2019-01-03 | End: 2019-01-05

## 2019-01-02 RX ORDER — HEPARIN SODIUM 5000 [USP'U]/ML
5000 INJECTION, SOLUTION INTRAVENOUS; SUBCUTANEOUS EVERY 8 HOURS
Status: DISCONTINUED | OUTPATIENT
Start: 2019-01-02 | End: 2019-01-07 | Stop reason: HOSPADM

## 2019-01-02 RX ORDER — PREDNISONE 10 MG/1
5 TABLET ORAL
Status: DISCONTINUED | OUTPATIENT
Start: 2019-01-03 | End: 2019-01-03

## 2019-01-02 RX ORDER — AMLODIPINE BESYLATE 5 MG/1
5 TABLET ORAL DAILY
Status: DISCONTINUED | OUTPATIENT
Start: 2019-01-03 | End: 2019-01-07 | Stop reason: HOSPADM

## 2019-01-02 RX ORDER — TAMSULOSIN HYDROCHLORIDE 0.4 MG/1
0.4 CAPSULE ORAL DAILY
Status: DISCONTINUED | OUTPATIENT
Start: 2019-01-03 | End: 2019-01-07 | Stop reason: HOSPADM

## 2019-01-02 RX ORDER — DEXTROSE 50 % IN WATER (D50W) INTRAVENOUS SYRINGE
25-50 AS NEEDED
Status: DISCONTINUED | OUTPATIENT
Start: 2019-01-02 | End: 2019-01-07 | Stop reason: HOSPADM

## 2019-01-02 RX ORDER — ONDANSETRON 2 MG/ML
4 INJECTION INTRAMUSCULAR; INTRAVENOUS
Status: DISCONTINUED | OUTPATIENT
Start: 2019-01-02 | End: 2019-01-07 | Stop reason: HOSPADM

## 2019-01-02 RX ORDER — MAGNESIUM SULFATE 100 %
4 CRYSTALS MISCELLANEOUS AS NEEDED
Status: DISCONTINUED | OUTPATIENT
Start: 2019-01-02 | End: 2019-01-07 | Stop reason: HOSPADM

## 2019-01-02 RX ORDER — ACETAMINOPHEN 325 MG/1
650 TABLET ORAL
Status: DISCONTINUED | OUTPATIENT
Start: 2019-01-02 | End: 2019-01-07 | Stop reason: HOSPADM

## 2019-01-02 RX ORDER — INSULIN LISPRO 100 [IU]/ML
INJECTION, SOLUTION INTRAVENOUS; SUBCUTANEOUS
Status: DISCONTINUED | OUTPATIENT
Start: 2019-01-03 | End: 2019-01-07 | Stop reason: HOSPADM

## 2019-01-02 RX ORDER — CARVEDILOL 12.5 MG/1
25 TABLET ORAL 2 TIMES DAILY WITH MEALS
Status: DISCONTINUED | OUTPATIENT
Start: 2019-01-03 | End: 2019-01-07 | Stop reason: HOSPADM

## 2019-01-02 RX ORDER — SODIUM CHLORIDE 9 MG/ML
75 INJECTION, SOLUTION INTRAVENOUS CONTINUOUS
Status: DISCONTINUED | OUTPATIENT
Start: 2019-01-02 | End: 2019-01-06

## 2019-01-02 RX ORDER — ATORVASTATIN CALCIUM 10 MG/1
10 TABLET, FILM COATED ORAL
Status: DISCONTINUED | OUTPATIENT
Start: 2019-01-02 | End: 2019-01-07 | Stop reason: HOSPADM

## 2019-01-02 RX ORDER — MELATONIN
1000 DAILY
Status: DISCONTINUED | OUTPATIENT
Start: 2019-01-03 | End: 2019-01-07 | Stop reason: HOSPADM

## 2019-01-02 RX ORDER — SODIUM CHLORIDE 0.9 % (FLUSH) 0.9 %
5-10 SYRINGE (ML) INJECTION AS NEEDED
Status: DISCONTINUED | OUTPATIENT
Start: 2019-01-02 | End: 2019-01-07 | Stop reason: HOSPADM

## 2019-01-02 RX ORDER — SODIUM CHLORIDE 0.9 % (FLUSH) 0.9 %
5-10 SYRINGE (ML) INJECTION EVERY 8 HOURS
Status: DISCONTINUED | OUTPATIENT
Start: 2019-01-02 | End: 2019-01-07 | Stop reason: HOSPADM

## 2019-01-02 RX ADMIN — SODIUM CHLORIDE 1000 ML: 900 INJECTION, SOLUTION INTRAVENOUS at 19:46

## 2019-01-02 RX ADMIN — SODIUM CHLORIDE 1000 ML: 900 INJECTION, SOLUTION INTRAVENOUS at 20:59

## 2019-01-02 RX ADMIN — ATORVASTATIN CALCIUM 10 MG: 10 TABLET, FILM COATED ORAL at 23:34

## 2019-01-02 RX ADMIN — HEPARIN SODIUM 5000 UNITS: 5000 INJECTION INTRAVENOUS; SUBCUTANEOUS at 23:34

## 2019-01-02 RX ADMIN — Medication 10 ML: at 23:34

## 2019-01-02 NOTE — ED PROVIDER NOTES
EMERGENCY DEPARTMENT HISTORY AND PHYSICAL EXAM 
 
 
Date: 1/2/2019 Patient Name: Susi Shen History of Presenting Illness Chief Complaint Patient presents with  Diarrhea Pt ambulatory to triage with c/o diarrhea x 1 week; no blood noted to stool; denies vomiting; referred by PCP for dehydration; pt states decreased appetite  Cough  
  dry x 1 week  Abdominal Pain  
  lower abdominal cramping x 1 week History Provided By: Patient HPI: Susi Shen, 75 y.o. male with PMHx significant for HTN and DM, presents ambulatory to the ED with cc of multiple episodes of diarrhea and constant lower non-radiating abdominal pain x 1 week. Pt was sent to ED by his PCP for evaluation of presenting sxs. He denies taking any recent abx. Pt denies any recent hospitalizations. He denies any modifying factors. He specifically denies nausea, vomiting, hematuria, dysuria, or blood in stool. There are no other complaints, changes, or physical findings at this time. PCP: Gilson Brunson MD  
Nephrologist: Jack Brandt. Olman Whitehead M.D. No current facility-administered medications on file prior to encounter. Current Outpatient Medications on File Prior to Encounter Medication Sig Dispense Refill  potassium chloride SA (MICRO-K) 10 mEq capsule  predniSONE (DELTASONE) 5 mg tablet TK 1 T PO BID  3  
 allopurinol (ZYLOPRIM) 300 mg tablet Take 2 Tabs by mouth daily. Indications: acute gouty arthritis 180 Tab 4  predniSONE (DELTASONE) 5 mg tablet 20mg x 3 days, 15mg x 3 days, 10mg x 3 days, 5mg x 3 days 30 Tab 1  
 GAVILYTE-C 240-22.72-6.72 -5.84 gram solution TAKE AS DIRECTED  0  
 cyanocobalamin (VITAMIN B-12) 1,000 mcg tablet Take 1,000 mcg by mouth daily.     
 amLODIPine (NORVASC) 5 mg tablet TAKE 1 TABLET BY MOUTH EVERY MORNING  3  
 magnesium oxide 250 mg tablet TAKE 1 TABLET BY MOUTH AS NEEDED ONCE A DAY ORALLY 30 DAY(S)  2  
  aspirin 81 mg chewable tablet Take 1 Tab by mouth daily. 30 Tab 11  
 carvedilol (COREG) 25 mg tablet Take 1 Tab by mouth two (2) times daily (with meals). 30 Tab 11  
 potassium chloride SR (KLOR-CON 10) 10 mEq tablet Take 1 Tab by mouth daily. 30 Tab 11  
 furosemide (LASIX) 20 mg tablet Take 1 Tab by mouth daily. 30 Tab 11  
 cholecalciferol (VITAMIN D3) 1,000 unit cap Take  by mouth daily.  lisinopril (PRINIVIL, ZESTRIL) 20 mg tablet Take  by mouth daily.  glipiZIDE SR (GLUCOTROL XL) 5 mg CR tablet Take 5 mg by mouth two (2) times a day.  pantoprazole (PROTONIX) 40 mg tablet Take 40 mg by mouth daily.  atorvastatin (LIPITOR) 10 mg tablet TAKE 1 TABLET BY MOUTH ONCE A DAY  2  
 tamsulosin (FLOMAX) 0.4 mg capsule Take 0.4 mg by mouth daily. Past History Past Medical History: 
Past Medical History:  
Diagnosis Date  Arthritis  Chronic back pain  Diabetes ([facility] [address]  Dyslipidemia  Gout  Hypertension  Left wrist fracture  Renal cancer ([facility] [address] Past Surgical History: 
Past Surgical History:  
Procedure Laterality Date  COLONOSCOPY N/A 6/5/2018 COLONOSCOPY performed by Ray [facility]ley MD at [facility] ENDOSCOPY  COLONOSCOPY,DIAGNOSTIC  3/24/2016  COLONOSCOPY,DIAGNOSTIC  6/5/2018  COLONOSCOPY,TANA CONKLIN,SNARE  3/24/2016  HX ORTHOPAEDIC Back and right knee surgery  HX ORTHOPAEDIC    
 carpal tunnel bilateral  
 RENAL SCOPE,W/RESECTION,TUMOR  UPPER GI ENDOSCOPY,BIOPSY  3/23/2016  UPPER GI ENDOSCOPY,BIOPSY  6/5/2018 Family History: 
Family History Problem Relation Age of Onset  Breast Cancer Mother  Diabetes Mother  Prostate Cancer Father  Cancer Sister   
     lung  Cancer Brother Social History: 
Social History Tobacco Use  Smoking status: Former Smoker Packs/day: 1.00 Years: 45.00 Pack years: 45.00 Last attempt to quit: 3/12/2005 Years since quittin.8  Smokeless tobacco: Never Used Substance Use Topics  Alcohol use: No  
  Comment: occasional  
 Drug use: No  
 
 
Allergies: 
No Known Allergies Review of Systems Review of Systems Constitutional: Negative for chills and fever. HENT: Negative for congestion and sore throat. Eyes: Negative for visual disturbance. Respiratory: Negative for cough and shortness of breath. Cardiovascular: Negative for chest pain and leg swelling. Gastrointestinal: Positive for abdominal pain and diarrhea. Negative for blood in stool, nausea and vomiting. Endocrine: Negative for polyuria. Genitourinary: Negative for dysuria, hematuria and testicular pain. Musculoskeletal: Negative for arthralgias, joint swelling and myalgias. Skin: Negative for rash. Allergic/Immunologic: Negative for immunocompromised state. Neurological: Negative for weakness and headaches. Hematological: Does not bruise/bleed easily. Psychiatric/Behavioral: Negative for confusion. Physical Exam  
Physical Exam  
Constitutional: He is oriented to person, place, and time. He appears well-developed and well-nourished. HENT:  
Head: Normocephalic and atraumatic. Moist mucous membranes Eyes: Conjunctivae are normal. Pupils are equal, round, and reactive to light. Right eye exhibits no discharge. Left eye exhibits no discharge. Neck: Normal range of motion. Neck supple. No tracheal deviation present. Cardiovascular: Normal rate, regular rhythm and normal heart sounds. No murmur heard. Pulmonary/Chest: Effort normal and breath sounds normal. No respiratory distress. He has no wheezes. He has no rales. Abdominal: Soft. Bowel sounds are normal. There is tenderness in the suprapubic area. There is no rebound and no guarding. Musculoskeletal: Normal range of motion. He exhibits no edema, tenderness or deformity. Neurological: He is alert and oriented to person, place, and time. Skin: Skin is warm and dry. No rash noted. No erythema. Psychiatric: His behavior is normal.  
Nursing note and vitals reviewed. Diagnostic Study Results Labs - Recent Results (from the past 12 hour(s)) CBC WITH AUTOMATED DIFF Collection Time: 01/02/19  3:10 PM  
Result Value Ref Range WBC 7.4 4.1 - 11.1 K/uL  
 RBC 3.80 (L) 4.10 - 5.70 M/uL  
 HGB 11.7 (L) 12.1 - 17.0 g/dL HCT 38.3 36.6 - 50.3 % .8 (H) 80.0 - 99.0 FL  
 MCH 30.8 26.0 - 34.0 PG  
 MCHC 30.5 30.0 - 36.5 g/dL  
 RDW 14.9 (H) 11.5 - 14.5 % PLATELET 660 785 - 202 K/uL MPV 12.1 8.9 - 12.9 FL  
 NRBC 0.0 0  WBC ABSOLUTE NRBC 0.00 0.00 - 0.01 K/uL NEUTROPHILS 62 32 - 75 % LYMPHOCYTES 20 12 - 49 % MONOCYTES 17 (H) 5 - 13 % EOSINOPHILS 2 0 - 7 % BASOPHILS 0 0 - 1 % IMMATURE GRANULOCYTES 0 0.0 - 0.5 % ABS. NEUTROPHILS 4.5 1.8 - 8.0 K/UL  
 ABS. LYMPHOCYTES 1.4 0.8 - 3.5 K/UL  
 ABS. MONOCYTES 1.2 (H) 0.0 - 1.0 K/UL  
 ABS. EOSINOPHILS 0.1 0.0 - 0.4 K/UL  
 ABS. BASOPHILS 0.0 0.0 - 0.1 K/UL  
 ABS. IMM. GRANS. 0.0 0.00 - 0.04 K/UL  
 DF AUTOMATED METABOLIC PANEL, COMPREHENSIVE Collection Time: 01/02/19  3:10 PM  
Result Value Ref Range Sodium 139 136 - 145 mmol/L Potassium 4.4 3.5 - 5.1 mmol/L Chloride 108 97 - 108 mmol/L  
 CO2 21 21 - 32 mmol/L Anion gap 10 5 - 15 mmol/L Glucose 82 65 - 100 mg/dL BUN 54 (H) 6 - 20 MG/DL Creatinine 3.79 (H) 0.70 - 1.30 MG/DL  
 BUN/Creatinine ratio 14 12 - 20 GFR est AA 19 (L) >60 ml/min/1.73m2 GFR est non-AA 16 (L) >60 ml/min/1.73m2 Calcium 7.9 (L) 8.5 - 10.1 MG/DL Bilirubin, total 0.4 0.2 - 1.0 MG/DL  
 ALT (SGPT) 14 12 - 78 U/L  
 AST (SGOT) 10 (L) 15 - 37 U/L Alk. phosphatase 66 45 - 117 U/L Protein, total 7.1 6.4 - 8.2 g/dL Albumin 2.9 (L) 3.5 - 5.0 g/dL Globulin 4.2 (H) 2.0 - 4.0 g/dL A-G Ratio 0.7 (L) 1.1 - 2.2 URINALYSIS W/ REFLEX CULTURE  Collection Time: 01/02/19  5:11 PM  
 Result Value Ref Range Color DARK YELLOW Appearance CLOUDY (A) CLEAR Specific gravity 1.018 1.003 - 1.030    
 pH (UA) 5.0 5.0 - 8.0 Protein 30 (A) NEG mg/dL Glucose NEGATIVE  NEG mg/dL Ketone TRACE (A) NEG mg/dL Blood NEGATIVE  NEG Urobilinogen 0.2 0.2 - 1.0 EU/dL Nitrites NEGATIVE  NEG Leukocyte Esterase SMALL (A) NEG    
 WBC 0-4 0 - 4 /hpf  
 RBC 0-5 0 - 5 /hpf Epithelial cells FEW FEW /lpf Bacteria 1+ (A) NEG /hpf  
 UA:UC IF INDICATED URINE CULTURE ORDERED (A) CNI Mucus 1+ (A) NEG /lpf Hyaline cast 0-2 0 - 5 /lpf  
BILIRUBIN, CONFIRM Collection Time: 01/02/19  5:11 PM  
Result Value Ref Range Bilirubin UA, confirm NEGATIVE  NEG    
GLUCOSE, POC Collection Time: 01/02/19  6:56 PM  
Result Value Ref Range Glucose (POC) 90 65 - 100 mg/dL Performed by Lydia Williamson (PCT) Radiologic Studies -  
US RETROPERITONEUM COMP Final Result IMPRESSION:   
  
  
  
 IMPRESSION:  
 
1. Bilateral renal cysts. Renal parenchyma otherwise unremarkable. 2. Enlarged prostate Narrative:  
 INDICATION: Acute kidney insufficiency COMPARISON: None. The patient was studied with real-time ultrasound. Coronal and transverse images 
of both kidneys were obtained. Renal parenchyma is normal in thickness and 
echotexture bilaterally with exception of multiple simple cysts in both 
kidneys. . There is no hydronephrosis.  The longest dimension of the right kidney 
is 10.1 cm, while the left kidney is 10.1 cm. The mid to distal abdominal aorta 
tapers normally. The proximal origins of the iliac arteries are normal. The IVC 
is patent. The urinary bladder was moderately full during examination. Prostate 
gland is enlarged and impinges on the bladder floor. XR CHEST PA LAT Final Result IMPRESSION:   
1. No evidence of a new cardiopulmonary process compared to 12/26/2018. 2.  Persistent mild right midlung linear opacity. Recommend follow-up radiograph  
in 3-4 weeks. CT ABD PELV WO CONT    (Results Pending) CXR Results  (Last 48 hours) 01/02/19 1709  XR CHEST PA LAT Final result Impression:  IMPRESSION:   
1. No evidence of a new cardiopulmonary process compared to 12/26/2018. 2.  Persistent mild right midlung linear opacity. Recommend follow-up radiograph  
in 3-4 weeks. Narrative:  EXAM:  XR CHEST PA LAT INDICATION: cough COMPARISON: 12/26/2018 x-ray. TECHNIQUE: Frontal and lateral views of the chest  
   
FINDINGS: Persistent mild linear opacity in the right midlung. No lobar  
consolidation, pleural effusion, or pneumothorax. Normal cardiomediastinal  
silhouette. No acute or aggressive osseous lesion. Medical Decision Making I am the first provider for this patient. I reviewed the vital signs, available nursing notes, past medical history, past surgical history, family history and social history. Vital Signs-Reviewed the patient's vital signs. Patient Vitals for the past 12 hrs: 
 Temp Pulse Resp BP SpO2  
01/02/19 1459 97.6 °F (36.4 °C) 85 16 (!) 160/110 97 % Pulse Oximetry Analysis - 97% on RA Cardiac Monitor:  
Rate: 85 bpm 
Rhythm: Normal Sinus Rhythm Records Reviewed: Nursing Notes, Old Medical Records, Previous Radiology Studies and Previous Laboratory Studies Provider Notes (Medical Decision Making): Acute on chronic renal failure secondary to dehydration, will need hospitalization. ED Course:  
Initial assessment performed. The patients presenting problems have been discussed, and they are in agreement with the care plan formulated and outlined with them. I have encouraged them to ask questions as they arise throughout their visit. CONSULT NOTE:  
7:21 PM 
Javon Barger DO spoke with Dr. Heriberto Pleitez, Specialty: Hospitalist 
 Discussed pt's hx, disposition, and available diagnostic and imaging results. Reviewed care plans. Consultant will evaluate pt for admission. Written by Mario Busch ED Scribe, as dictated by Kathie Caceres DO. Procedure Note- Peripheral IV access with Ultrasound Guidance 8:17 PM 
Breanne Oshea DO gained IV access using  20 gauge needle because the patient had no vascular access. After cleaning the site with alcohol prep, the R AC vein was localized with ultrasound guidance in an anterior approach. Line confirmation was obtained by direct visualization and good blood return. No anaesthetic was used. The line was successfully flushed with normal saline and was secured with transparent tape. The patient tolerated the procedure without complication. Critical Care Time:  
0 minutes. Disposition: 
ADMIT NOTE: 
7:21 PM 
The patient is being admitted to the hospital by Dr. Gustavo Nieves. The results of their tests and reasons for their admission have been discussed with the patient and/or available family. They convey agreement and understanding for the need to be admitted and for their admission diagnosis. PLAN: 
1. Admit to hospitalist 
 
Diagnosis Clinical Impression: 1. Acute kidney injury ([address] 2. Diarrhea, unspecified type Attestations: This note is prepared by Mario Busch, acting as Scribe for Blayne Simms DO: The scribe's documentation has been prepared under my direction and personally reviewed by me in its entirety. I confirm that the note above accurately reflects all work, treatment, procedures, and medical decision making performed by me.

## 2019-01-02 NOTE — ED NOTES
4:55 PM 
Mara Gamino PA-C  has evaluated the patient as the [facility] provider. They have reviewed the vital signs and the triage nurse assessment. They have talked with the patient and any available family and advised that the appropriate studies have been ordered to initiate the work up based on the clinical presentation during the assessment. The pt has been advised that they will be accommodated in the Main ED as soon as possible. Pt is here to be evaluated for \"dehydration. \" Pt states he was sent here by his PCP for evaluation. He reports that he has a history of \"kidney problems\" but he is unable to identify what those are. He states that he has been experiencing diarrhea despite taking imodium. He has also been experiencing a dry, intermittent cough. Denies fever or chills.

## 2019-01-03 LAB
ANION GAP SERPL CALC-SCNC: 9 MMOL/L (ref 5–15)
BUN SERPL-MCNC: 47 MG/DL (ref 6–20)
BUN/CREAT SERPL: 18 (ref 12–20)
C DIFF TOX GENS STL QL NAA+PROBE: POSITIVE
CALCIUM SERPL-MCNC: 7.1 MG/DL (ref 8.5–10.1)
CHLORIDE SERPL-SCNC: 114 MMOL/L (ref 97–108)
CO2 SERPL-SCNC: 19 MMOL/L (ref 21–32)
CREAT SERPL-MCNC: 2.61 MG/DL (ref 0.7–1.3)
CRYPTOSP AG STL QL: NEGATIVE
G LAMBLIA AG STL QL: NEGATIVE
GLUCOSE BLD STRIP.AUTO-MCNC: 120 MG/DL (ref 65–100)
GLUCOSE BLD STRIP.AUTO-MCNC: 122 MG/DL (ref 65–100)
GLUCOSE BLD STRIP.AUTO-MCNC: 141 MG/DL (ref 65–100)
GLUCOSE BLD STRIP.AUTO-MCNC: 149 MG/DL (ref 65–100)
GLUCOSE BLD STRIP.AUTO-MCNC: 154 MG/DL (ref 65–100)
GLUCOSE SERPL-MCNC: 132 MG/DL (ref 65–100)
INTERPRETATION: ABNORMAL
PCR REFLEX: ABNORMAL
POTASSIUM SERPL-SCNC: 3.9 MMOL/L (ref 3.5–5.1)
SERVICE CMNT-IMP: ABNORMAL
SODIUM SERPL-SCNC: 142 MMOL/L (ref 136–145)
WBC #/AREA STL HPF: NORMAL /HPF (ref 0–4)

## 2019-01-03 PROCEDURE — 89055 LEUKOCYTE ASSESSMENT FECAL: CPT

## 2019-01-03 PROCEDURE — 82962 GLUCOSE BLOOD TEST: CPT

## 2019-01-03 PROCEDURE — 80048 BASIC METABOLIC PNL TOTAL CA: CPT

## 2019-01-03 PROCEDURE — 36415 COLL VENOUS BLD VENIPUNCTURE: CPT

## 2019-01-03 PROCEDURE — 87045 FECES CULTURE AEROBIC BACT: CPT

## 2019-01-03 PROCEDURE — 87329 GIARDIA AG IA: CPT

## 2019-01-03 PROCEDURE — 74011250637 HC RX REV CODE- 250/637: Performed by: INTERNAL MEDICINE

## 2019-01-03 PROCEDURE — 65270000029 HC RM PRIVATE

## 2019-01-03 PROCEDURE — 74011250636 HC RX REV CODE- 250/636: Performed by: INTERNAL MEDICINE

## 2019-01-03 PROCEDURE — 87493 C DIFF AMPLIFIED PROBE: CPT

## 2019-01-03 PROCEDURE — 87449 NOS EACH ORGANISM AG IA: CPT

## 2019-01-03 PROCEDURE — 87177 OVA AND PARASITES SMEARS: CPT

## 2019-01-03 RX ORDER — VANCOMYCIN HYDROCHLORIDE 250 MG/5ML
125 POWDER, FOR SOLUTION ORAL EVERY 6 HOURS
Status: DISCONTINUED | OUTPATIENT
Start: 2019-01-03 | End: 2019-01-07 | Stop reason: HOSPADM

## 2019-01-03 RX ORDER — GABAPENTIN 300 MG/1
300 CAPSULE ORAL 3 TIMES DAILY
COMMUNITY
Start: 2018-12-17

## 2019-01-03 RX ORDER — HYDROCODONE BITARTRATE AND ACETAMINOPHEN 5; 325 MG/1; MG/1
1 TABLET ORAL
Status: DISCONTINUED | OUTPATIENT
Start: 2019-01-03 | End: 2019-01-07 | Stop reason: HOSPADM

## 2019-01-03 RX ORDER — ALBUTEROL SULFATE 90 UG/1
2 AEROSOL, METERED RESPIRATORY (INHALATION)
Refills: 0 | COMMUNITY
Start: 2018-12-21

## 2019-01-03 RX ADMIN — VANCOMYCIN HYDROCHLORIDE 125 MG: KIT at 19:17

## 2019-01-03 RX ADMIN — PANTOPRAZOLE SODIUM 40 MG: 40 TABLET, DELAYED RELEASE ORAL at 10:11

## 2019-01-03 RX ADMIN — VANCOMYCIN HYDROCHLORIDE 125 MG: KIT at 23:09

## 2019-01-03 RX ADMIN — VANCOMYCIN HYDROCHLORIDE 125 MG: KIT at 06:15

## 2019-01-03 RX ADMIN — ONDANSETRON 4 MG: 2 INJECTION INTRAMUSCULAR; INTRAVENOUS at 22:03

## 2019-01-03 RX ADMIN — CARVEDILOL 25 MG: 12.5 TABLET, FILM COATED ORAL at 17:13

## 2019-01-03 RX ADMIN — HYDROCODONE BITARTRATE AND ACETAMINOPHEN 1 TABLET: 5; 325 TABLET ORAL at 17:13

## 2019-01-03 RX ADMIN — TAMSULOSIN HYDROCHLORIDE 0.4 MG: 0.4 CAPSULE ORAL at 10:11

## 2019-01-03 RX ADMIN — VITAMIN D, TAB 1000IU (100/BT) 1000 UNITS: 25 TAB at 10:12

## 2019-01-03 RX ADMIN — Medication 10 ML: at 23:10

## 2019-01-03 RX ADMIN — ACETAMINOPHEN 650 MG: 325 TABLET ORAL at 19:22

## 2019-01-03 RX ADMIN — HEPARIN SODIUM 5000 UNITS: 5000 INJECTION INTRAVENOUS; SUBCUTANEOUS at 17:13

## 2019-01-03 RX ADMIN — VANCOMYCIN HYDROCHLORIDE 125 MG: KIT at 12:04

## 2019-01-03 RX ADMIN — Medication 10 ML: at 17:15

## 2019-01-03 RX ADMIN — HYDROCODONE BITARTRATE AND ACETAMINOPHEN 1 TABLET: 5; 325 TABLET ORAL at 23:09

## 2019-01-03 RX ADMIN — Medication 10 ML: at 06:00

## 2019-01-03 RX ADMIN — CARVEDILOL 25 MG: 12.5 TABLET, FILM COATED ORAL at 10:12

## 2019-01-03 RX ADMIN — HEPARIN SODIUM 5000 UNITS: 5000 INJECTION INTRAVENOUS; SUBCUTANEOUS at 06:40

## 2019-01-03 RX ADMIN — ATORVASTATIN CALCIUM 10 MG: 10 TABLET, FILM COATED ORAL at 23:10

## 2019-01-03 RX ADMIN — SODIUM CHLORIDE 125 ML/HR: 900 INJECTION, SOLUTION INTRAVENOUS at 19:17

## 2019-01-03 RX ADMIN — SODIUM CHLORIDE 125 ML/HR: 900 INJECTION, SOLUTION INTRAVENOUS at 00:36

## 2019-01-03 RX ADMIN — AMLODIPINE BESYLATE 5 MG: 5 TABLET ORAL at 10:11

## 2019-01-03 RX ADMIN — SODIUM CHLORIDE 125 ML/HR: 900 INJECTION, SOLUTION INTRAVENOUS at 09:57

## 2019-01-03 RX ADMIN — VANCOMYCIN HYDROCHLORIDE 125 MG: KIT at 01:20

## 2019-01-03 RX ADMIN — HEPARIN SODIUM 5000 UNITS: 5000 INJECTION INTRAVENOUS; SUBCUTANEOUS at 23:09

## 2019-01-03 NOTE — PROGRESS NOTES
Hospitalist Progress NoteNAME: Denny Vargas :  1945 MRN:  [id number] Interim Hospital Summary: 75 y.o. male whom presented on 2019 with Assessment / Plan: 
 
C difficile + diarrhea 
-CT abdomen did not show significant colitis 
-no fever or leukocytosis  
-start oral Vancomycin 
-discussed with patient and family importance of hand washing and to consider probiotics KAITLIN on CKD3 Hx of renal CA s/p partial nephrectomy Suspect prerenal d/t vol depletion from diarrhea Cr trending down. Continue IVFs Holding ACEi and lasix 
  
HTN/HLD Cont amlodipine, carvedilol Cont atorvastatin Holding ACEi and lasix 
  
DM2 Hold home glipizide SSI and POCs 
  
Hx inflammatory arthritis Gout Hold home allopurinol in setting of KAITLIN  
  
Hx of remote tobacco use Encouraged continued abstinence 
  
Code Status: Full Surrogate Decision Maker: wife 
  
DVT Prophylaxis: heparin sq GI Prophylaxis: not indicated 
  
Baseline: Independent ADLs, lives at home with wife. Uses a cane to ambulate when out of the house Subjective: Chief Complaint / Reason for Physician Visit Follow up of  c difficille colitis, KAITLIN, DM Chart reviewed in detail. Discussed with RN events overnight. Still having BMs every hour but no vomiting and tolerating food today Review of Systems: 
Symptom Y/N Comments  Symptom Y/N Comments Fever/Chills    Chest Pain Poor Appetite    Edema Cough    Abdominal Pain y Sputum    Joint Pain SOB/OG    Pruritis/Rash Nausea/vomit    Tolerating PT/OT Diarrhea y   Tolerating Diet Constipation    Other Could NOT obtain due to:   
 
PO intake: No data found. Objective: VITALS:  
Last 24hrs VS reviewed since prior progress note. Most recent are: 
Patient Vitals for the past 24 hrs: 
 Temp Pulse Resp BP SpO2  
19 1453 97.6 °F (36.4 °C) 85 18 125/61 94 % 01/03/19 1340 96.1 °F (35.6 °C) 84 20 120/70 96 % 01/03/19 1339  85 19  96 % 01/03/19 1300  87 21 121/60 96 % 01/03/19 1112 97.2 °F (36.2 °C)      
01/03/19 0800 98.1 °F (36.7 °C) 91 29 142/66 96 % 01/03/19 0715  83 26 112/58 94 % 01/03/19 0630  84 24 115/55 94 % 01/03/19 0624  83 26 111/60 94 % 01/03/19 0430  83 21 110/56 96 % 01/03/19 0400 97.8 °F (36.6 °C) 82 24 116/61 93 % 01/03/19 0330  82 27 111/57 94 % 01/03/19 0300  80 29 108/53 94 % 01/03/19 0230  82 17 99/55 95 % 01/03/19 0201  90 23 123/42 94 % 01/03/19 0130  82 19 104/60 95 % 01/03/19 0100  81 22 95/52 93 % 01/03/19 0045  82 20 102/53 95 % 01/03/19 0000  86 23 108/62 94 % 01/02/19 2330  84 14 96/58 90 % 01/02/19 2300  86 21 103/72 94 % 01/02/19 2230  93 30 138/59 90 % 01/02/19 2200  88 21 127/57 92 % 01/02/19 2130  83 26 120/57 90 % 01/02/19 2100  84 25 114/50 90 % 01/02/19 2030  84 18 113/51 92 % 01/02/19 2027     97 % 01/02/19 2000 97.7 °F (36.5 °C) 81 16 101/48 92 % No intake or output data in the 24 hours ending 01/03/19 1633 PHYSICAL EXAM: 
General: WD, WN. Alert, cooperative, no acute distress   
EENT:  EOMI. Anicteric sclerae. MMM Resp:  CTA bilaterally, no wheezing or rales. No accessory muscle use CV:  Regular  rhythm,  No edema GI:  Soft, Non distended, mild lower abdominal tenderness.  +Bowel sounds Neurologic:  Alert and oriented X 3, normal speech, Psych:   Good insight. Not anxious nor agitated Skin:  No rashes. No jaundice Reviewed most current lab test results and cultures  YES Reviewed most current radiology test results   YES Review and summation of old records today    NO Reviewed patient's current orders and MAR    YES 
PMH/SH reviewed - no change compared to H&P 
________________________________________________________________________ Care Plan discussed with: 
  Comments Patient x Family  x wife RN Care Manager Consultant Multidiciplinary team rounds were held today with , nursing, pharmacist and clinical coordinator. Patient's plan of care was discussed; medications were reviewed and discharge planning was addressed. ________________________________________________________________________ Total NON critical care TIME:   30    Minutes Total CRITICAL CARE TIME Spent:   Minutes non procedure based Comments >50% of visit spent in counseling and coordination of care x This includes time during multidisciplinary rounds if indicated above  
________________________________________________________________________ Natty Guerrero MD  
 
Procedures: see electronic medical records for all procedures/Xrays and details which were not copied into this note but were reviewed prior to creation of Plan. LABS: 
I reviewed today's most current labs and imaging studies. Pertinent labs include: 
Recent Labs 01/02/19  WBC 7.4 HGB 11.7* HCT 38.3  Recent Labs 01/03/19 
0426 01/02/19   139  
K 3.9 4.4  
* 108 CO2 19* 21 * 82 BUN 47* 54* CREA 2.61* 3.79* CA 7.1* 7.9* ALB  --  2.9* TBILI  --  0.4 SGOT  --  10* ALT  --  14

## 2019-01-03 NOTE — ED NOTES
Pt medicated per MAR. Pt on monitors x 3. Pt. Resting comfortably in bed, denies needs at this time. Bed locked and low, call bell in reach.

## 2019-01-03 NOTE — ED NOTES
Bedside and Verbal shift change report given to Fernando Mir (oncoming nurse) by Gil Peoples RN (offgoing nurse). Report included the following information SBAR, Kardex, ED Summary, [facility] and Recent Results.

## 2019-01-03 NOTE — H&P
Hospitalist Admission NoteNAME: Herminia Eden :  1945 MRN:  [id number] Date/Time:  2019 10:46 PM 
 
Patient PCP: Lee Calero MD 
______________________________________________________________________ Given the patient's current clinical presentation, I have a high level of concern for decompensation if discharged from the emergency department. Complex decision making was performed, which includes reviewing the patient's available past medical records, laboratory results, and x-ray films. My assessment of this patient's clinical condition and my plan of care is as follows. Assessment / Plan: 
Voluminous water diarrhea, concerning for C diff 
reports recent abx for URI symps from urgent care, unsure of which antibiotic 
stool studies, C diff panel pending 
start oral vanc KAITLIN on CKD3 Hx of renal CA s/p partial nephrectomy Suspect prerenal d/t vol depletion from diarrhea Monitor BMP Consider nephro consult if not improving with IVF and treatment of above HTN/HLD Cont amlodipine, carvedilol, hold lisinopril d/t kaitlin Cont atorvastatin DM2 Hold home glipizide SSI and POCs Hx inflammatory arthritis Gout Hold home allopurinol in setting of KAITLIN  
freq Tx with prednisone but has not been on recently Hx of remote tobacco use Encouraged continued abstinence Code Status: Full Surrogate Decision Maker: wife DVT Prophylaxis: heparin sq GI Prophylaxis: not indicated Baseline: Independent ADLs, lives at home with wife. Uses a cane to ambulate when out of the house Subjective: CHIEF COMPLAINT: Watery diarrhea HISTORY OF PRESENT ILLNESS:    
Herminia Eden is a 75 y.o. male with PMhx of gout, HTN, DM2, inflammatory arthritis, Hx or renal CA, and CKD who presents to the ED with primary complaint of voluminous watery diarrhea, reporting easily more than 5 BMs per day--\"I lose count\".  He admits to taking antibiotics which were started recently for a cough and cold. He admits occasional chills, denies any fevers/nausea/vomiting. Pt also denies any recent travel, exotic foods, or known proximity to sick contacts. He denies gross blood per rectum or dark stools. We were asked to admit for work up and evaluation of the above problems. Past Medical History:  
Diagnosis Date  Arthritis  Chronic back pain  Diabetes ([facility] [address]  Dyslipidemia  Gout  Hypertension  Left wrist fracture  Renal cancer ([facility] [address] Past Surgical History:  
Procedure Laterality Date  COLONOSCOPY N/A 2018 COLONOSCOPY performed by Seth Cruz MD at \[facility]\"" ENDOSCOPY  COLONOSCOPY,DIAGNOSTIC  3/24/2016  COLONOSCOPY,DIAGNOSTIC  2018  COLONOSCOPY,TANA CONKLIN,SNARE  3/24/2016  HX ORTHOPAEDIC Back and right knee surgery  HX ORTHOPAEDIC    
 carpal tunnel bilateral  
 RENAL SCOPE,W/RESECTION,TUMOR  UPPER GI ENDOSCOPY,BIOPSY  3/23/2016  UPPER GI ENDOSCOPY,BIOPSY  2018 Social History Tobacco Use  Smoking status: Former Smoker Packs/day: 1.00 Years: 45.00 Pack years: 45.00 Last attempt to quit: 3/12/2005 Years since quittin.8  Smokeless tobacco: Never Used Substance Use Topics  Alcohol use: No  
  Comment: occasional  
  
 
Family History Problem Relation Age of Onset  Breast Cancer Mother  Diabetes Mother  Prostate Cancer Father  Cancer Sister   
     lung  Cancer Brother No Known Allergies Prior to Admission medications Medication Sig Start Date End Date Taking? Authorizing Provider  
potassium chloride SA (MICRO-K) 10 mEq capsule  18   Provider, Historical  
predniSONE (DELTASONE) 5 mg tablet TK 1 T PO BID 5/15/18   Provider, Historical  
allopurinol (ZYLOPRIM) 300 mg tablet Take 2 Tabs by mouth daily.  Indications: acute gouty arthritis 18   Melissa López MD  
 predniSONE (DELTASONE) 5 mg tablet 20mg x 3 days, 15mg x 3 days, 10mg x 3 days, 5mg x 3 days 7/23/18   Aneesh Cardoza MD  
GAVILYTE-C 287-87.82-3.75 -5.84 gram solution TAKE AS DIRECTED 4/17/18   Provider, Historical  
cyanocobalamin (VITAMIN B-12) 1,000 mcg tablet Take 1,000 mcg by mouth daily. Provider, Historical  
amLODIPine (NORVASC) 5 mg tablet TAKE 1 TABLET BY MOUTH EVERY MORNING 11/21/17   Provider, Historical  
magnesium oxide 250 mg tablet TAKE 1 TABLET BY MOUTH AS NEEDED ONCE A DAY ORALLY 30 DAY(S) 4/21/17   Provider, Historical  
aspirin 81 mg chewable tablet Take 1 Tab by mouth daily. 4/17/17   Reinaldo Bojorquez MD  
carvedilol (COREG) 25 mg tablet Take 1 Tab by mouth two (2) times daily (with meals). 4/17/17   Jarett Quigley MD  
potassium chloride SR (KLOR-CON 10) 10 mEq tablet Take 1 Tab by mouth daily. 4/17/17   Renialdo Bojorquez MD  
furosemide (LASIX) 20 mg tablet Take 1 Tab by mouth daily. 4/17/17   Jarett Quigley MD  
cholecalciferol (VITAMIN D3) 1,000 unit cap Take  by mouth daily. Provider, Historical  
lisinopril (PRINIVIL, ZESTRIL) 20 mg tablet Take  by mouth daily. Provider, Historical  
glipiZIDE SR (GLUCOTROL XL) 5 mg CR tablet Take 5 mg by mouth two (2) times a day. 12/26/16   Provider, Historical  
pantoprazole (PROTONIX) 40 mg tablet Take 40 mg by mouth daily. Provider, Historical  
atorvastatin (LIPITOR) 10 mg tablet TAKE 1 TABLET BY MOUTH ONCE A DAY 8/31/16   Provider, Historical  
tamsulosin (FLOMAX) 0.4 mg capsule Take 0.4 mg by mouth daily. Other, MD Maverick  
 
 
REVIEW OF SYSTEMS:    
I am not able to complete the review of systems because: The patient is intubated and sedated The patient has altered mental status due to his acute medical problems The patient has baseline aphasia from prior stroke(s) The patient has baseline dementia and is not reliable historian The patient is in acute medical distress and unable to provide information Total of 12 systems reviewed as follows:   
   POSITIVE= underlined text  Negative = text not underlined General:  fever, chills, sweats, generalized weakness, weight loss/gain,  
   loss of appetite Eyes:    blurred vision, eye pain, loss of vision, double vision ENT:    rhinorrhea, pharyngitis Respiratory:   cough, sputum production, SOB, OG, wheezing, pleuritic pain  
Cardiology:   chest pain, palpitations, orthopnea, PND, edema, syncope Gastrointestinal:  abdominal pain , N/V, diarrhea, dysphagia, constipation, bleeding Genitourinary:  frequency, urgency, dysuria, hematuria, incontinence Muskuloskeletal :  arthralgia, myalgia, back pain Hematology:  easy bruising, nose or gum bleeding, lymphadenopathy Dermatological: rash, ulceration, pruritis, color change / jaundice Endocrine:   hot flashes or polydipsia Neurological:  headache, dizziness, confusion, focal weakness, paresthesia, Speech difficulties, memory loss, gait difficulty Psychological: Feelings of anxiety, depression, agitation Objective: VITALS:   
Visit Vitals /59 (BP 1 Location: Left arm, BP Patient Position: At rest) Pulse 93 Temp 97.7 °F (36.5 °C) Resp 30 Ht 6' (1.829 m) Wt 89 kg (196 lb 3.4 oz) SpO2 90% BMI 26.61 kg/m² PHYSICAL EXAM: 
 
General:    Alert, cooperative, appears uncomfortable, no acute distress HEENT: Atraumatic, anicteric sclerae, pink conjunctivae No oral ulcers, mucosa moist, throat clear, dentition fair Neck:  Supple, symmetrical,  thyroid: non tender Lungs:   Clear to auscultation bilaterally. No Wheezing or Rhonchi. No rales. Chest wall:  No tenderness  No Accessory muscle use. Heart:   Regular  rhythm,  No  murmur   No edema Abdomen:   Soft, non-tender. Not distended. Bowel sounds normal 
Extremities: No cyanosis. No clubbing,   
  Skin turgor normal, Capillary refill normal, Radial dial pulse 2+ Skin:     Not pale. Not Jaundiced  No rashes Psych:  Good insight. Not depressed. Not anxious or agitated. Neurologic: EOMs intact. No facial asymmetry. No aphasia or slurred speech. Symmetrical strength, Sensation grossly intact. Alert and oriented X 4.  
 
_______________________________________________________________________ Care Plan discussed with: 
  Comments Patient x Family  x   
RN x Care Manager Consultant:     
_______________________________________________________________________ Expected  Disposition:  
Home with Family x HH/PT/OT/RN x  
SNF/LTC   
JANEL   
________________________________________________________________________ TOTAL TIME: > 60 Minutes Critical Care Provided     Minutes non procedure based Comments  
 x Reviewed previous records  
>50% of visit spent in counseling and coordination of care x Discussion with patient and/or family and questions answered 
  
 
________________________________________________________________________ Signed: Cindy Cuello DO 
 
Procedures: see electronic medical records for all procedures/Xrays and details which were not copied into this note but were reviewed prior to creation of Plan. LAB DATA REVIEWED:   
Recent Results (from the past 24 hour(s)) CBC WITH AUTOMATED DIFF Collection Time: 01/02/19  3:10 PM  
Result Value Ref Range WBC 7.4 4.1 - 11.1 K/uL  
 RBC 3.80 (L) 4.10 - 5.70 M/uL  
 HGB 11.7 (L) 12.1 - 17.0 g/dL HCT 38.3 36.6 - 50.3 % .8 (H) 80.0 - 99.0 FL  
 MCH 30.8 26.0 - 34.0 PG  
 MCHC 30.5 30.0 - 36.5 g/dL  
 RDW 14.9 (H) 11.5 - 14.5 % PLATELET 367 608 - 169 K/uL MPV 12.1 8.9 - 12.9 FL  
 NRBC 0.0 0  WBC ABSOLUTE NRBC 0.00 0.00 - 0.01 K/uL NEUTROPHILS 62 32 - 75 % LYMPHOCYTES 20 12 - 49 % MONOCYTES 17 (H) 5 - 13 % EOSINOPHILS 2 0 - 7 % BASOPHILS 0 0 - 1 % IMMATURE GRANULOCYTES 0 0.0 - 0.5 % ABS. NEUTROPHILS 4.5 1.8 - 8.0 K/UL  
 ABS. LYMPHOCYTES 1.4 0.8 - 3.5 K/UL ABS. MONOCYTES 1.2 (H) 0.0 - 1.0 K/UL  
 ABS. EOSINOPHILS 0.1 0.0 - 0.4 K/UL  
 ABS. BASOPHILS 0.0 0.0 - 0.1 K/UL  
 ABS. IMM. GRANS. 0.0 0.00 - 0.04 K/UL  
 DF AUTOMATED METABOLIC PANEL, COMPREHENSIVE Collection Time: 01/02/19  3:10 PM  
Result Value Ref Range Sodium 139 136 - 145 mmol/L Potassium 4.4 3.5 - 5.1 mmol/L Chloride 108 97 - 108 mmol/L  
 CO2 21 21 - 32 mmol/L Anion gap 10 5 - 15 mmol/L Glucose 82 65 - 100 mg/dL BUN 54 (H) 6 - 20 MG/DL Creatinine 3.79 (H) 0.70 - 1.30 MG/DL  
 BUN/Creatinine ratio 14 12 - 20 GFR est AA 19 (L) >60 ml/min/1.73m2 GFR est non-AA 16 (L) >60 ml/min/1.73m2 Calcium 7.9 (L) 8.5 - 10.1 MG/DL Bilirubin, total 0.4 0.2 - 1.0 MG/DL  
 ALT (SGPT) 14 12 - 78 U/L  
 AST (SGOT) 10 (L) 15 - 37 U/L Alk. phosphatase 66 45 - 117 U/L Protein, total 7.1 6.4 - 8.2 g/dL Albumin 2.9 (L) 3.5 - 5.0 g/dL Globulin 4.2 (H) 2.0 - 4.0 g/dL A-G Ratio 0.7 (L) 1.1 - 2.2 URINALYSIS W/ REFLEX CULTURE Collection Time: 01/02/19  5:11 PM  
Result Value Ref Range Color DARK YELLOW Appearance CLOUDY (A) CLEAR Specific gravity 1.018 1.003 - 1.030    
 pH (UA) 5.0 5.0 - 8.0 Protein 30 (A) NEG mg/dL Glucose NEGATIVE  NEG mg/dL Ketone TRACE (A) NEG mg/dL Blood NEGATIVE  NEG Urobilinogen 0.2 0.2 - 1.0 EU/dL Nitrites NEGATIVE  NEG Leukocyte Esterase SMALL (A) NEG    
 WBC 0-4 0 - 4 /hpf  
 RBC 0-5 0 - 5 /hpf Epithelial cells FEW FEW /lpf Bacteria 1+ (A) NEG /hpf  
 UA:UC IF INDICATED URINE CULTURE ORDERED (A) CNI Mucus 1+ (A) NEG /lpf Hyaline cast 0-2 0 - 5 /lpf  
BILIRUBIN, CONFIRM Collection Time: 01/02/19  5:11 PM  
Result Value Ref Range Bilirubin UA, confirm NEGATIVE  NEG    
SAMPLES BEING HELD Collection Time: 01/02/19  6:30 PM  
Result Value Ref Range SAMPLES BEING HELD RD, LAV PST, SHAKIRA   
 COMMENT   Add-on orders for these samples will be processed based on acceptable specimen integrity and analyte stability, which may vary by analyte. GLUCOSE, POC Collection Time: 01/02/19  6:56 PM  
Result Value Ref Range Glucose (POC) 90 65 - 100 mg/dL Performed by Kayce Bejarano (PCT) POC CHEM8 Collection Time: 01/02/19 10:01 PM  
Result Value Ref Range Calcium, ionized (POC) 1.06 (L) 1.12 - 1.32 mmol/L Sodium (POC) 142 136 - 145 mmol/L Potassium (POC) 3.9 3.5 - 5.1 mmol/L Chloride (POC) 108 (H) 98 - 107 mmol/L  
 CO2 (POC) 18 (L) 21 - 32 mmol/L Anion gap (POC) 21 (H) 10 - 20 mmol/L Glucose (POC) 66 65 - 100 mg/dL BUN (POC) 43 (H) 9 - 20 mg/dL Creatinine (POC) 3.4 (H) 0.6 - 1.3 mg/dL GFRAA, POC 22 (L) >60 ml/min/1.73m2 GFRNA, POC 18 (L) >60 ml/min/1.73m2 Hematocrit (POC) 29 (L) 36.6 - 50.3 % Comment Comment Not Indicated. GLUCOSE, POC Collection Time: 01/02/19 10:25 PM  
Result Value Ref Range Glucose (POC) 87 65 - 100 mg/dL Performed by Estiven Dunn GLUCOSE, POC Collection Time: 01/02/19 10:43 PM  
Result Value Ref Range Glucose (POC) 120 (H) 65 - 100 mg/dL Performed by Estiven Dunn

## 2019-01-03 NOTE — ED NOTES
Pt medicated per MAR. Bedside cleansed for pts comfort. Pts face wiped with washcloth for care. Pt. Resting comfortably in bed, denies needs at this time. Bed locked and low, call bell in reach.

## 2019-01-03 NOTE — ED NOTES
Pt medicated per MAR. Pt on monitors x 3. Wife bedside. IVF infusing at this time. Pt. Resting comfortably in bed, denies needs at this time. Bed locked and low, call bell in reach.

## 2019-01-03 NOTE — ED NOTES
Pt continues to have IVF. Pt. Resting comfortably in bed, denies needs at this time. Bed locked and low, call bell in reach.

## 2019-01-03 NOTE — PROGRESS NOTES
TRANSFER - IN REPORT: 
 
Verbal report received from Jaylen(name) on Geovany Octave  being received from ER(unit) for routine progression of care Report consisted of patients Situation, Background, Assessment and  
Recommendations(SBAR). Information from the following report(s) SBAR, Kardex, Intake/Output, MAR and Recent Results was reviewed with the receiving nurse. Opportunity for questions and clarification was provided. Assessment completed upon patients arrival to unit and care assumed.

## 2019-01-03 NOTE — ED NOTES
Pt in bed at this time. Bedside commode cleaned and ready for pt. Pt on monitors x 3. Wife bedside. Denies needs at this time. Bed locked and low, call bell in reach.

## 2019-01-03 NOTE — ED NOTES
Bedside shift change report given to Lilli Serra RN (oncoming nurse) by Elizabeth Johnson RN (offgoing nurse). Report included the following information SBAR, Kardex, ED Summary, [facility] and Recent Results. Assumed care of patient who is lying quietly on the stretcher in no apparent distress. Pt is alert and oriented x 4. Respirations are even and unlabored. No needs are expressed at this time. Call bell within reach. Side rails x 2. Cardiac monitor x 3. Stretcher locked in the lowest position. Will continue to monitor.

## 2019-01-03 NOTE — ED NOTES
Pts IV infiltrated at this time. Pts IV changed and pts IVF infusing at this time. Family bedside. Pt. Resting comfortably in bed, denies needs at this time. Bed locked and low, call bell in reach.

## 2019-01-03 NOTE — ED NOTES
Pt sleeping at this time. Pt. Resting comfortably in bed, denies needs at this time. Bed locked and low, call bell in reach.

## 2019-01-03 NOTE — ED NOTES
Pt medicated per MAR. Pt placed on 2L via n/c for comfort. Pt. Resting comfortably in bed, denies needs at this time. Bed locked and low, call bell in reach. \

## 2019-01-03 NOTE — ED NOTES
Pt returned from CT at this time. Family bedside. Pt on monitors x 3. Pt c/o mild chest discomfort due to cough. Pt has had diarrhea and cough for 1 week. Denies blood in stool. Pt reports non-productive cough. Pt pain 6/10. Pt medicated per MAR. Pt. Resting comfortably in bed, denies needs at this time. Bed locked and low, call bell in reach.

## 2019-01-03 NOTE — ED NOTES
Bedside shift change report given to [facility] RN (oncoming nurse) by Andrea Kim (offgoing nurse). Report included the following information SBAR, ED Summary, MAR and Recent Results.

## 2019-01-04 ENCOUNTER — APPOINTMENT (OUTPATIENT)
Dept: GENERAL RADIOLOGY | Age: 74
DRG: 372 | End: 2019-01-04
Attending: NURSE PRACTITIONER
Payer: MEDICARE

## 2019-01-04 LAB
ANION GAP SERPL CALC-SCNC: 8 MMOL/L (ref 5–15)
BACTERIA SPEC CULT: ABNORMAL
BACTERIA SPEC CULT: ABNORMAL
BUN SERPL-MCNC: 39 MG/DL (ref 6–20)
BUN/CREAT SERPL: 19 (ref 12–20)
CALCIUM SERPL-MCNC: 8.2 MG/DL (ref 8.5–10.1)
CC UR VC: ABNORMAL
CHLORIDE SERPL-SCNC: 114 MMOL/L (ref 97–108)
CO2 SERPL-SCNC: 20 MMOL/L (ref 21–32)
CREAT SERPL-MCNC: 2.03 MG/DL (ref 0.7–1.3)
GLUCOSE BLD STRIP.AUTO-MCNC: 128 MG/DL (ref 65–100)
GLUCOSE BLD STRIP.AUTO-MCNC: 141 MG/DL (ref 65–100)
GLUCOSE BLD STRIP.AUTO-MCNC: 148 MG/DL (ref 65–100)
GLUCOSE BLD STRIP.AUTO-MCNC: 151 MG/DL (ref 65–100)
GLUCOSE SERPL-MCNC: 170 MG/DL (ref 65–100)
MAGNESIUM SERPL-MCNC: 1.9 MG/DL (ref 1.6–2.4)
POTASSIUM SERPL-SCNC: 4.8 MMOL/L (ref 3.5–5.1)
SERVICE CMNT-IMP: ABNORMAL
SODIUM SERPL-SCNC: 142 MMOL/L (ref 136–145)

## 2019-01-04 PROCEDURE — 74011250636 HC RX REV CODE- 250/636: Performed by: HOSPITALIST

## 2019-01-04 PROCEDURE — 74011250637 HC RX REV CODE- 250/637: Performed by: INTERNAL MEDICINE

## 2019-01-04 PROCEDURE — 82962 GLUCOSE BLOOD TEST: CPT

## 2019-01-04 PROCEDURE — 65270000029 HC RM PRIVATE

## 2019-01-04 PROCEDURE — 72040 X-RAY EXAM NECK SPINE 2-3 VW: CPT

## 2019-01-04 PROCEDURE — 74011250637 HC RX REV CODE- 250/637: Performed by: NURSE PRACTITIONER

## 2019-01-04 PROCEDURE — 97161 PT EVAL LOW COMPLEX 20 MIN: CPT

## 2019-01-04 PROCEDURE — 83735 ASSAY OF MAGNESIUM: CPT

## 2019-01-04 PROCEDURE — 74011250636 HC RX REV CODE- 250/636: Performed by: INTERNAL MEDICINE

## 2019-01-04 PROCEDURE — 51798 US URINE CAPACITY MEASURE: CPT

## 2019-01-04 PROCEDURE — 36415 COLL VENOUS BLD VENIPUNCTURE: CPT

## 2019-01-04 PROCEDURE — 80048 BASIC METABOLIC PNL TOTAL CA: CPT

## 2019-01-04 PROCEDURE — 97165 OT EVAL LOW COMPLEX 30 MIN: CPT

## 2019-01-04 RX ORDER — FENTANYL CITRATE 50 UG/ML
25 INJECTION, SOLUTION INTRAMUSCULAR; INTRAVENOUS ONCE
Status: COMPLETED | OUTPATIENT
Start: 2019-01-04 | End: 2019-01-04

## 2019-01-04 RX ORDER — FINASTERIDE 5 MG/1
5 TABLET, FILM COATED ORAL DAILY
Status: DISCONTINUED | OUTPATIENT
Start: 2019-01-04 | End: 2019-01-07 | Stop reason: HOSPADM

## 2019-01-04 RX ADMIN — PANTOPRAZOLE SODIUM 40 MG: 40 TABLET, DELAYED RELEASE ORAL at 06:53

## 2019-01-04 RX ADMIN — VANCOMYCIN HYDROCHLORIDE 125 MG: KIT at 23:35

## 2019-01-04 RX ADMIN — ACETAMINOPHEN 650 MG: 325 TABLET ORAL at 21:11

## 2019-01-04 RX ADMIN — VANCOMYCIN HYDROCHLORIDE 125 MG: KIT at 06:53

## 2019-01-04 RX ADMIN — HYDROCODONE BITARTRATE AND ACETAMINOPHEN 1 TABLET: 5; 325 TABLET ORAL at 18:14

## 2019-01-04 RX ADMIN — FENTANYL CITRATE 25 MCG: 50 INJECTION, SOLUTION INTRAMUSCULAR; INTRAVENOUS at 02:53

## 2019-01-04 RX ADMIN — AMLODIPINE BESYLATE 5 MG: 5 TABLET ORAL at 09:52

## 2019-01-04 RX ADMIN — CARVEDILOL 25 MG: 12.5 TABLET, FILM COATED ORAL at 09:51

## 2019-01-04 RX ADMIN — VANCOMYCIN HYDROCHLORIDE 125 MG: KIT at 18:07

## 2019-01-04 RX ADMIN — SODIUM CHLORIDE 125 ML/HR: 900 INJECTION, SOLUTION INTRAVENOUS at 20:01

## 2019-01-04 RX ADMIN — Medication 10 ML: at 21:13

## 2019-01-04 RX ADMIN — Medication 10 ML: at 13:57

## 2019-01-04 RX ADMIN — TAMSULOSIN HYDROCHLORIDE 0.4 MG: 0.4 CAPSULE ORAL at 09:52

## 2019-01-04 RX ADMIN — CARVEDILOL 25 MG: 12.5 TABLET, FILM COATED ORAL at 16:52

## 2019-01-04 RX ADMIN — HEPARIN SODIUM 5000 UNITS: 5000 INJECTION INTRAVENOUS; SUBCUTANEOUS at 06:53

## 2019-01-04 RX ADMIN — FINASTERIDE 5 MG: 5 TABLET, FILM COATED ORAL at 12:08

## 2019-01-04 RX ADMIN — Medication 10 ML: at 05:37

## 2019-01-04 RX ADMIN — ATORVASTATIN CALCIUM 10 MG: 10 TABLET, FILM COATED ORAL at 21:11

## 2019-01-04 RX ADMIN — VITAMIN D, TAB 1000IU (100/BT) 1000 UNITS: 25 TAB at 09:51

## 2019-01-04 RX ADMIN — VANCOMYCIN HYDROCHLORIDE 125 MG: KIT at 11:58

## 2019-01-04 RX ADMIN — HEPARIN SODIUM 5000 UNITS: 5000 INJECTION INTRAVENOUS; SUBCUTANEOUS at 13:57

## 2019-01-04 RX ADMIN — HEPARIN SODIUM 5000 UNITS: 5000 INJECTION INTRAVENOUS; SUBCUTANEOUS at 23:35

## 2019-01-04 NOTE — PROGRESS NOTES
Hospitalist Progress NoteNAME: Tyler Kearns :  1945 MRN:  [id number] Assessment / Plan: 
 
C difficile infection POA with persistent diarrhea 
-CT abdomen did not show significant colitis 
-no fever or leukocytosis  
-start oral Vancomycin 
-discussed with patient and family importance of hand washing and to consider probiotics Acute kidney injury POA admit creat 3.79 CKD stage 3 POA baseline creat 1.1 to 1.3 in 2018 Acute urinary retention 2018 Hx of renal CA s/p partial nephrectomy Suspect prerenal d/t vol depletion from diarrhea and ? Retention(no hydro on US) Cr trending down. Continue IVFs Required straight cath overnight 1000 cc, still retaining later in day ~ 1000 cc Dougherty placed 1/3/2018 Holding ACEi and lasix IVF Creatinine improving Serial labs Suspect the urinary retention due to BPH and IVF May need to consider an MRI C spine with the neck pain to be sure not neurogenic Flomax Leave for 5 to 7 days, outpatient urology follow up Neck pain POA Seems musculoskeletal, known DDD of the L-spine Neck -x-rays just done, results pending Pain control Consider MRI Motor exam , non focal 
  
Essential HTN POA Cont amlodipine, carvedilol Holding ACE in and lasix PRN hydralazine Hyperlipidemia POA Statin 
  
DM type 2 POA Hold home glipizide SSI and POCs Peak BS past 24 hours was 154 
  
Hx inflammatory arthritis Gout Hold home allopurinol in setting of KAITLIN  
  
Hx of remote tobacco use Encouraged continued abstinence 
  
Code Status: Full Surrogate Decision Maker: wife 
  
DVT Prophylaxis: heparin sq GI Prophylaxis: not indicated 
  
Baseline: Independent ADLs, lives at home with wife. Uses a cane to ambulate when out of the house Subjective: Chief Complaint / Reason for Physician Visit Follow up of  c difficille colitis, KAITLIN, DM Still having a lot of loose stools\" Loose not watery diarrhea, reports 4 Bms overnight, maybe a bit better No abdominal pain, no N/V Mod to severe neck pain, no associated muscle weakness, Neck-X-rays just done Not passing urine well, straight cath for 1000 cc, then l;ater in day, not able to urinate, caldwell placed for 1000cc Chart reviewed in detail. Discussed with RN events overnight. Review of Systems: 
Symptom Y/N Comments  Symptom Y/N Comments Fever/Chills n   Chest Pain n   
Poor Appetite    Edema Cough n   Abdominal Pain y Sputum    Joint Pain SOB/OG n   Pruritis/Rash Nausea/vomit    Tolerating PT/OT Diarrhea y   Tolerating Diet n   
Constipation    Other Could NOT obtain due to:   
 
PO intake:  
Patient Vitals for the past 72 hrs: 
 % Diet Eaten 01/04/19 1400 75 % 01/04/19 1000 100 % Objective: VITALS:  
Last 24hrs VS reviewed since prior progress note. Most recent are: 
Patient Vitals for the past 24 hrs: 
 Temp Pulse Resp BP SpO2  
01/04/19 0856 97.5 °F (36.4 °C) 97 18 150/70 93 % 01/03/19 2316 98.5 °F (36.9 °C) 88 18 149/63 96 % Intake/Output Summary (Last 24 hours) at 1/4/2019 1454 Last data filed at 1/4/2019 1400 Gross per 24 hour Intake 360 ml Output 2600 ml Net -2240 ml PHYSICAL EXAM: 
General: WD, WN. Alert, cooperative, no acute distress   
EENT:  EOMI. Anicteric sclerae. MMM Resp:  CTA bilaterally, no wheezing or rales. No accessory muscle use CV:  Regular  rhythm,  No edema GI:  Soft, Non distended, mild lower abdominal tenderness.  +Bowel sounds Neurologic:  Alert and oriented X 3, normal speech, 5/5 motor strength Psych:   Good insight. Not anxious nor agitated Skin:  No rashes. No jaundice Reviewed most current lab test results and cultures  YES Reviewed most current radiology test results   YES Review and summation of old records today    NO Reviewed patient's current orders and MAR    YES 
PMH/SH reviewed - no change compared to H&P 
 ________________________________________________________________________ Care Plan discussed with: 
  Comments Patient x Family  x wife RN Care Manager Consultant Multidiciplinary team rounds were held today with , nursing, pharmacist and clinical coordinator. Patient's plan of care was discussed; medications were reviewed and discharge planning was addressed. ________________________________________________________________________ Total NON critical care TIME:   25    Minutes Total CRITICAL CARE TIME Spent:   Minutes non procedure based Comments >50% of visit spent in counseling and coordination of care x This includes time during multidisciplinary rounds if indicated above  
________________________________________________________________________ Krystian Mcdonough MD  
 
Procedures: see electronic medical records for all procedures/Xrays and details which were not copied into this note but were reviewed prior to creation of Plan. LABS: 
I reviewed today's most current labs and imaging studies. Pertinent labs include: 
Recent Labs 01/02/19 [address] 131 WBC 7.4 HGB 11.7* HCT 38.3  Recent Labs 01/04/19 
5498 01/03/19 
0426 01/02/19 [address]  142 139  
K 4.8 3.9 4.4  
* 114* 108 CO2 20* 19* 21 * 132* 82 BUN 39* 47* 54* CREA 2.03* 2.61* 3.79* CA 8.2* 7.1* 7.9*  
MG 1.9  --   --   
ALB  --   --  2.9* TBILI  --   --  0.4 SGOT  --   --  10* ALT  --   --  14

## 2019-01-04 NOTE — PROGRESS NOTES
Bedside and Verbal shift change report given to Estefania Aparicio (oncoming nurse) by Buffy Covarrubias (offgoing nurse). Report included the following information SBAR, Kardex, Intake/Output, MAR and Recent Results.
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
## 2019-01-04 NOTE — PROGRESS NOTES
Patient stated that he was unable to pee and felt like he could not pee. Bladder scanned patient and he had 1138 mL in bladder. 1199Hall Gone MD notified. 0: MD ordered straight catheter. 0405: Straight cath performed with Coude catheter due to patients prostate history. 1138 mL of urine removed from bladder. 0630: Patient was able to void on his own. Will continue to monitor and notify dayshift.

## 2019-01-04 NOTE — PROGRESS NOTES
Patient VOIDED 200ml of urine. PVR showed 1034 upon bladder scan. Will insert the caldwell as per doctor's orders

## 2019-01-05 LAB
ANION GAP SERPL CALC-SCNC: 8 MMOL/L (ref 5–15)
BACTERIA SPEC CULT: NORMAL
BASOPHILS # BLD: 0 K/UL (ref 0–0.1)
BASOPHILS NFR BLD: 0 % (ref 0–1)
BUN SERPL-MCNC: 20 MG/DL (ref 6–20)
BUN/CREAT SERPL: 14 (ref 12–20)
C JEJUNI+C COLI AG STL QL: NEGATIVE
CALCIUM SERPL-MCNC: 8.2 MG/DL (ref 8.5–10.1)
CHLORIDE SERPL-SCNC: 118 MMOL/L (ref 97–108)
CO2 SERPL-SCNC: 20 MMOL/L (ref 21–32)
CREAT SERPL-MCNC: 1.43 MG/DL (ref 0.7–1.3)
DIFFERENTIAL METHOD BLD: ABNORMAL
E COLI SXT1+2 STL IA: NEGATIVE
EOSINOPHIL # BLD: 0.1 K/UL (ref 0–0.4)
EOSINOPHIL NFR BLD: 3 % (ref 0–7)
ERYTHROCYTE [DISTWIDTH] IN BLOOD BY AUTOMATED COUNT: 14.6 % (ref 11.5–14.5)
GLUCOSE BLD STRIP.AUTO-MCNC: 109 MG/DL (ref 65–100)
GLUCOSE BLD STRIP.AUTO-MCNC: 117 MG/DL (ref 65–100)
GLUCOSE BLD STRIP.AUTO-MCNC: 125 MG/DL (ref 65–100)
GLUCOSE BLD STRIP.AUTO-MCNC: 125 MG/DL (ref 65–100)
GLUCOSE SERPL-MCNC: 112 MG/DL (ref 65–100)
HCT VFR BLD AUTO: 34.9 % (ref 36.6–50.3)
HGB BLD-MCNC: 10.8 G/DL (ref 12.1–17)
IMM GRANULOCYTES # BLD: 0 K/UL (ref 0–0.04)
IMM GRANULOCYTES NFR BLD AUTO: 0 % (ref 0–0.5)
LYMPHOCYTES # BLD: 0.9 K/UL (ref 0.8–3.5)
LYMPHOCYTES NFR BLD: 23 % (ref 12–49)
MCH RBC QN AUTO: 30.7 PG (ref 26–34)
MCHC RBC AUTO-ENTMCNC: 30.9 G/DL (ref 30–36.5)
MCV RBC AUTO: 99.1 FL (ref 80–99)
MONOCYTES # BLD: 0.6 K/UL (ref 0–1)
MONOCYTES NFR BLD: 14 % (ref 5–13)
NEUTS SEG # BLD: 2.5 K/UL (ref 1.8–8)
NEUTS SEG NFR BLD: 60 % (ref 32–75)
NRBC # BLD: 0 K/UL (ref 0–0.01)
NRBC BLD-RTO: 0 PER 100 WBC
PLATELET # BLD AUTO: 178 K/UL (ref 150–400)
PMV BLD AUTO: 11.5 FL (ref 8.9–12.9)
POTASSIUM SERPL-SCNC: 4.4 MMOL/L (ref 3.5–5.1)
RBC # BLD AUTO: 3.52 M/UL (ref 4.1–5.7)
SERVICE CMNT-IMP: ABNORMAL
SERVICE CMNT-IMP: NORMAL
SODIUM SERPL-SCNC: 146 MMOL/L (ref 136–145)
WBC # BLD AUTO: 4.1 K/UL (ref 4.1–11.1)

## 2019-01-05 PROCEDURE — 74011250636 HC RX REV CODE- 250/636: Performed by: NURSE PRACTITIONER

## 2019-01-05 PROCEDURE — 74011250637 HC RX REV CODE- 250/637: Performed by: INTERNAL MEDICINE

## 2019-01-05 PROCEDURE — 82962 GLUCOSE BLOOD TEST: CPT

## 2019-01-05 PROCEDURE — 74011250637 HC RX REV CODE- 250/637: Performed by: NURSE PRACTITIONER

## 2019-01-05 PROCEDURE — 36415 COLL VENOUS BLD VENIPUNCTURE: CPT

## 2019-01-05 PROCEDURE — 80048 BASIC METABOLIC PNL TOTAL CA: CPT

## 2019-01-05 PROCEDURE — 74011250636 HC RX REV CODE- 250/636: Performed by: INTERNAL MEDICINE

## 2019-01-05 PROCEDURE — 85025 COMPLETE CBC W/AUTO DIFF WBC: CPT

## 2019-01-05 PROCEDURE — 74011636637 HC RX REV CODE- 636/637: Performed by: NURSE PRACTITIONER

## 2019-01-05 PROCEDURE — 65270000029 HC RM PRIVATE

## 2019-01-05 RX ORDER — PREDNISONE 20 MG/1
40 TABLET ORAL
Status: COMPLETED | OUTPATIENT
Start: 2019-01-05 | End: 2019-01-06

## 2019-01-05 RX ADMIN — CARVEDILOL 25 MG: 12.5 TABLET, FILM COATED ORAL at 18:03

## 2019-01-05 RX ADMIN — VITAMIN D, TAB 1000IU (100/BT) 1000 UNITS: 25 TAB at 08:13

## 2019-01-05 RX ADMIN — VANCOMYCIN HYDROCHLORIDE 125 MG: KIT at 12:29

## 2019-01-05 RX ADMIN — FINASTERIDE 5 MG: 5 TABLET, FILM COATED ORAL at 08:13

## 2019-01-05 RX ADMIN — AMLODIPINE BESYLATE 5 MG: 5 TABLET ORAL at 08:13

## 2019-01-05 RX ADMIN — ATORVASTATIN CALCIUM 10 MG: 10 TABLET, FILM COATED ORAL at 22:14

## 2019-01-05 RX ADMIN — TAMSULOSIN HYDROCHLORIDE 0.4 MG: 0.4 CAPSULE ORAL at 08:13

## 2019-01-05 RX ADMIN — Medication 10 ML: at 05:17

## 2019-01-05 RX ADMIN — VANCOMYCIN HYDROCHLORIDE 125 MG: KIT at 05:18

## 2019-01-05 RX ADMIN — SODIUM CHLORIDE 75 ML/HR: 900 INJECTION, SOLUTION INTRAVENOUS at 20:29

## 2019-01-05 RX ADMIN — HEPARIN SODIUM 5000 UNITS: 5000 INJECTION INTRAVENOUS; SUBCUTANEOUS at 14:37

## 2019-01-05 RX ADMIN — HEPARIN SODIUM 5000 UNITS: 5000 INJECTION INTRAVENOUS; SUBCUTANEOUS at 22:14

## 2019-01-05 RX ADMIN — HYDROCODONE BITARTRATE AND ACETAMINOPHEN 1 TABLET: 5; 325 TABLET ORAL at 01:53

## 2019-01-05 RX ADMIN — HYDROCODONE BITARTRATE AND ACETAMINOPHEN 1 TABLET: 5; 325 TABLET ORAL at 09:34

## 2019-01-05 RX ADMIN — Medication 10 ML: at 14:37

## 2019-01-05 RX ADMIN — HYDROCODONE BITARTRATE AND ACETAMINOPHEN 1 TABLET: 5; 325 TABLET ORAL at 14:37

## 2019-01-05 RX ADMIN — SODIUM CHLORIDE 125 ML/HR: 900 INJECTION, SOLUTION INTRAVENOUS at 03:10

## 2019-01-05 RX ADMIN — HEPARIN SODIUM 5000 UNITS: 5000 INJECTION INTRAVENOUS; SUBCUTANEOUS at 06:18

## 2019-01-05 RX ADMIN — VANCOMYCIN HYDROCHLORIDE 125 MG: KIT at 18:03

## 2019-01-05 RX ADMIN — PANTOPRAZOLE SODIUM 40 MG: 40 TABLET, DELAYED RELEASE ORAL at 08:13

## 2019-01-05 RX ADMIN — CARVEDILOL 25 MG: 12.5 TABLET, FILM COATED ORAL at 08:13

## 2019-01-05 RX ADMIN — PREDNISONE 40 MG: 20 TABLET ORAL at 18:03

## 2019-01-05 RX ADMIN — Medication 10 ML: at 22:14

## 2019-01-05 NOTE — PROGRESS NOTES
Consult called for orthopedic surgery, patient has history with Dr. Kaitlynn Alfaro so [facility]o [facility] consulted.

## 2019-01-05 NOTE — PROGRESS NOTES
Hospitalist Progress Note NAME: Denny Vargas :  1945 MRN:  [id number] Interim Hospital Summary: 75 y.o. male whom presented on 2019 with Assessment / Plan: 
C difficile infection POA with persistent diarrhea 
- no loose stool over night 
- CT abdomen did not show significant colitis 
- no fever or leukocytosis  
- continue withl Vancomycin (last dose 2019) 
- discussed with patient and family importance of hand washing  
  
Acute kidney injury POA admit creat 3.79 CKD stage 3 POA baseline creat 1.1 to 1.3 in 2018 Acute urinary retention 2018 Hx of renal CA s/p partial nephrectomy 
- renal ultra sound:  
  1. Bilateral renal cysts. Renal parenchyma otherwise unremarkable. 2. Enlarged prostate 
- caldwell was inserted on 2019 due to large PVR (about 1000ml) 
- continue with flomax and proscar. 
- pt needs to follow up with  in one week 
- continue with IVF 
- Holding ACEi and lasix 
- creat trending down 1.43 (was 3.79 on admission) 
  
Neck pain POA 
- limited cervical ROM due to severe pain. - Cervical spine x-ray: kyphosis with extensive multilevel degenerative chagnes - PT recommends outpatient therapy only. Pt was having difficult time getting out of bed due to severe pain this morning. No numbness/tingling sensation. Attempt to apply soft cervical collar but the large one did not fit. Ortho consult has been placed 
- ultram PRN for pain 
  
Essential HTN POA 
- Cont amlodipine, carvedilol 
- Holding ACE in and lasix 
- PRN hydralazine 
  
Hyperlipidemia POA  
- Statin 
  
DM type 2 POA 
- Hold home glipizide 
- hgbA1C 7.5 on . Will recheck in am 
- check qac/qhs blood glucose and follow SSI and POCs 
- fasting blood glucose has been less than 150  
  
Hx inflammatory arthritis Gout 
- Hold home allopurinol in setting of KAITLIN; will resume if pt starts to c/o gouty pain 
  
Hx of remote tobacco use - Encouraged continued abstinence 
  
 Code Status: Full Surrogate Decision Maker: wife 
  
DVT Prophylaxis: heparin sq GI Prophylaxis: not indicated 
  
Baseline: Independent ADLs, lives at home with wife. Uses a cane to ambulate when out of the house Disposition; home with family 
  
Subjective: Chief Complaint / Reason for Physician Visit \"my neck hurts really bad. I am having hard time move around. \"  Discussed with RN events overnight. Review of Systems: 
Symptom Y/N Comments  Symptom Y/N Comments Fever/Chills n   Chest Pain n   
Poor Appetite    Edema Cough    Abdominal Pain Sputum    Joint Pain y Neck pain SOB/OG n   Pruritis/Rash Nausea/vomit n   Tolerating PT/OT Diarrhea n   Tolerating Diet Constipation    Other Could NOT obtain due to:   
 
Objective: VITALS:  
Last 24hrs VS reviewed since prior progress note. Most recent are: 
Patient Vitals for the past 24 hrs: 
 Temp Pulse Resp BP SpO2  
01/05/19 0802 97.7 °F (36.5 °C) 98 18 167/84 96 % 01/04/19 2223 97.4 °F (36.3 °C) 90 18 146/74 93 % 01/04/19 1533 97.4 °F (36.3 °C) 85 16 168/83 92 % Intake/Output Summary (Last 24 hours) at 1/5/2019 1019 Last data filed at 1/5/2019 0340 Gross per 24 hour Intake 6560.83 ml Output 2950 ml Net 3610.83 ml PHYSICAL EXAM: 
General: WD, WN. Alert, cooperative, no acute distress   
EENT:  EOMI. Anicteric sclerae. MMM. Limited ROM of neck due to pain Resp:  CTA bilaterally, no wheezing or rales. No accessory muscle use CV:  Regular  rhythm,  No edema GI:  Soft, Non distended, Non tender.  +Bowel sounds. Dougherty draining yellow urine Neurologic:  Alert and oriented X 3, normal speech, Psych:   Good insight. Not anxious nor agitated Skin:  No rashes. No jaundice Reviewed most current lab test results and cultures  YES Reviewed most current radiology test results   YES Review and summation of old records today    NO Reviewed patient's current orders and MAR    YES 
 PMH/SH reviewed - no change compared to H&P 
________________________________________________________________________ Care Plan discussed with: 
  Comments Patient y Family  y Wife at bedside RN y   
Care Manager Consultant Multidiciplinary team rounds were held today with , nursing, pharmacist and clinical coordinator. Patient's plan of care was discussed; medications were reviewed and discharge planning was addressed. ________________________________________________________________________ Total NON critical care TIME:  30  Minutes Total CRITICAL CARE TIME Spent:   Minutes non procedure based Comments >50% of visit spent in counseling and coordination of care    
________________________________________________________________________ Andrzej Espino NP Procedures: see electronic medical records for all procedures/Xrays and details which were not copied into this note but were reviewed prior to creation of Plan. LABS: 
I reviewed today's most current labs and imaging studies. Pertinent labs include: 
Recent Labs 01/05/19 
6815 01/02/19 [address] [facility] [address] [facility] 131 WBC 4.1 7.4 HGB 10.8* 11.7* HCT 34.9* 38.3  193 Recent Labs 01/05/19 
9343 01/04/19 
1506 01/03/19 
0426 01/02/19 [address] [facility] [address] [facility] 131 * 142 142 139  
K 4.4 4.8 3.9 4.4  
* 114* 114* 108 CO2 20* 20* 19* 21 * 170* 132* 82 BUN 20 39* 47* 54* CREA 1.43* 2.03* 2.61* 3.79* CA 8.2* 8.2* 7.1* 7.9*  
MG  --  1.9  --   --   
ALB  --   --   --  2.9* TBILI  --   --   --  0.4 SGOT  --   --   --  10* ALT  --   --   --  14 Signed: )Trish Galloway, NP

## 2019-01-05 NOTE — CONSULTS
Pt with on and off neck pain worse recently. Admitted with C. Diff  denies radiation or weakness    Patient Vitals for the past 12 hrs:   Temp Pulse Resp BP SpO2   01/05/19 1215 98.2 °F (36.8 °C) 82 18 146/77 95 %   01/05/19 0802 97.7 °F (36.5 °C) 98 18 167/84 96 %     ttp posterior neck  rom limited to pain   strength equal    Pt for gentle rom  Medrol dose pack if he can tolerate medically  May need  Cervical MRI.

## 2019-01-05 NOTE — PROGRESS NOTES
01/04/`19 2111: Pt given tylenol 650 mg for neck pain 01/05/19 
0153: Pt given Norco  1 tab for neck pain. Pt assisted in up-right position to  ease the neck pain. Bedside and Verbal shift change report given to [place] (oncoming nurse) by Radha Ennis RN (offgoing nurse). Report included the following information SBAR, Kardex, Intake/Output, MAR, Accordion, Recent Results and Med Rec Status.

## 2019-01-05 NOTE — PROGRESS NOTES
Bedside and Verbal shift change report given to Nancy Ortega (oncoming nurse) by Abbey Win (offgoing nurse). Report included the following information SBAR, Kardex, MAR and Recent Results.

## 2019-01-06 LAB
ALBUMIN SERPL-MCNC: 2.4 G/DL (ref 3.5–5)
ALBUMIN/GLOB SERPL: 0.6 {RATIO} (ref 1.1–2.2)
ALP SERPL-CCNC: 62 U/L (ref 45–117)
ALT SERPL-CCNC: 24 U/L (ref 12–78)
ANION GAP SERPL CALC-SCNC: 13 MMOL/L (ref 5–15)
AST SERPL-CCNC: 47 U/L (ref 15–37)
BASOPHILS # BLD: 0 K/UL (ref 0–0.1)
BASOPHILS NFR BLD: 0 % (ref 0–1)
BILIRUB SERPL-MCNC: 0.5 MG/DL (ref 0.2–1)
BUN SERPL-MCNC: 17 MG/DL (ref 6–20)
BUN/CREAT SERPL: 15 (ref 12–20)
CALCIUM SERPL-MCNC: 8 MG/DL (ref 8.5–10.1)
CHLORIDE SERPL-SCNC: 115 MMOL/L (ref 97–108)
CO2 SERPL-SCNC: 17 MMOL/L (ref 21–32)
CREAT SERPL-MCNC: 1.16 MG/DL (ref 0.7–1.3)
DIFFERENTIAL METHOD BLD: ABNORMAL
EOSINOPHIL # BLD: 0 K/UL (ref 0–0.4)
EOSINOPHIL NFR BLD: 0 % (ref 0–7)
ERYTHROCYTE [DISTWIDTH] IN BLOOD BY AUTOMATED COUNT: 14.4 % (ref 11.5–14.5)
GLOBULIN SER CALC-MCNC: 4.2 G/DL (ref 2–4)
GLUCOSE BLD STRIP.AUTO-MCNC: 141 MG/DL (ref 65–100)
GLUCOSE BLD STRIP.AUTO-MCNC: 143 MG/DL (ref 65–100)
GLUCOSE BLD STRIP.AUTO-MCNC: 144 MG/DL (ref 65–100)
GLUCOSE BLD STRIP.AUTO-MCNC: 148 MG/DL (ref 65–100)
GLUCOSE SERPL-MCNC: 168 MG/DL (ref 65–100)
HCT VFR BLD AUTO: 34.2 % (ref 36.6–50.3)
HGB BLD-MCNC: 10.6 G/DL (ref 12.1–17)
IMM GRANULOCYTES # BLD: 0.1 K/UL (ref 0–0.04)
IMM GRANULOCYTES NFR BLD AUTO: 1 % (ref 0–0.5)
LYMPHOCYTES # BLD: 0.6 K/UL (ref 0.8–3.5)
LYMPHOCYTES NFR BLD: 11 % (ref 12–49)
MCH RBC QN AUTO: 30.5 PG (ref 26–34)
MCHC RBC AUTO-ENTMCNC: 31 G/DL (ref 30–36.5)
MCV RBC AUTO: 98.6 FL (ref 80–99)
MONOCYTES # BLD: 0.2 K/UL (ref 0–1)
MONOCYTES NFR BLD: 3 % (ref 5–13)
NEUTS SEG # BLD: 4.1 K/UL (ref 1.8–8)
NEUTS SEG NFR BLD: 85 % (ref 32–75)
NRBC # BLD: 0 K/UL (ref 0–0.01)
NRBC BLD-RTO: 0 PER 100 WBC
PLATELET # BLD AUTO: 190 K/UL (ref 150–400)
PMV BLD AUTO: 11.6 FL (ref 8.9–12.9)
POTASSIUM SERPL-SCNC: 4.5 MMOL/L (ref 3.5–5.1)
PROT SERPL-MCNC: 6.6 G/DL (ref 6.4–8.2)
RBC # BLD AUTO: 3.47 M/UL (ref 4.1–5.7)
RBC MORPH BLD: ABNORMAL
SERVICE CMNT-IMP: ABNORMAL
SODIUM SERPL-SCNC: 145 MMOL/L (ref 136–145)
WBC # BLD AUTO: 5 K/UL (ref 4.1–11.1)

## 2019-01-06 PROCEDURE — 82962 GLUCOSE BLOOD TEST: CPT

## 2019-01-06 PROCEDURE — 65270000029 HC RM PRIVATE

## 2019-01-06 PROCEDURE — 74011250637 HC RX REV CODE- 250/637: Performed by: NURSE PRACTITIONER

## 2019-01-06 PROCEDURE — 74011636637 HC RX REV CODE- 636/637: Performed by: NURSE PRACTITIONER

## 2019-01-06 PROCEDURE — 74011250636 HC RX REV CODE- 250/636: Performed by: INTERNAL MEDICINE

## 2019-01-06 PROCEDURE — 74011250637 HC RX REV CODE- 250/637: Performed by: INTERNAL MEDICINE

## 2019-01-06 PROCEDURE — 85025 COMPLETE CBC W/AUTO DIFF WBC: CPT

## 2019-01-06 PROCEDURE — 36415 COLL VENOUS BLD VENIPUNCTURE: CPT

## 2019-01-06 PROCEDURE — 80053 COMPREHEN METABOLIC PANEL: CPT

## 2019-01-06 RX ADMIN — VITAMIN D, TAB 1000IU (100/BT) 1000 UNITS: 25 TAB at 10:03

## 2019-01-06 RX ADMIN — CARVEDILOL 25 MG: 12.5 TABLET, FILM COATED ORAL at 10:03

## 2019-01-06 RX ADMIN — VANCOMYCIN HYDROCHLORIDE 125 MG: KIT at 05:54

## 2019-01-06 RX ADMIN — VANCOMYCIN HYDROCHLORIDE 125 MG: KIT at 01:13

## 2019-01-06 RX ADMIN — AMLODIPINE BESYLATE 5 MG: 5 TABLET ORAL at 10:03

## 2019-01-06 RX ADMIN — PREDNISONE 40 MG: 20 TABLET ORAL at 17:56

## 2019-01-06 RX ADMIN — CARVEDILOL 25 MG: 12.5 TABLET, FILM COATED ORAL at 17:56

## 2019-01-06 RX ADMIN — VANCOMYCIN HYDROCHLORIDE 125 MG: KIT at 23:11

## 2019-01-06 RX ADMIN — HEPARIN SODIUM 5000 UNITS: 5000 INJECTION INTRAVENOUS; SUBCUTANEOUS at 15:23

## 2019-01-06 RX ADMIN — Medication 10 ML: at 15:23

## 2019-01-06 RX ADMIN — HEPARIN SODIUM 5000 UNITS: 5000 INJECTION INTRAVENOUS; SUBCUTANEOUS at 21:44

## 2019-01-06 RX ADMIN — Medication 10 ML: at 05:55

## 2019-01-06 RX ADMIN — VANCOMYCIN HYDROCHLORIDE 125 MG: KIT at 13:15

## 2019-01-06 RX ADMIN — HYDROCODONE BITARTRATE AND ACETAMINOPHEN 1 TABLET: 5; 325 TABLET ORAL at 18:06

## 2019-01-06 RX ADMIN — TAMSULOSIN HYDROCHLORIDE 0.4 MG: 0.4 CAPSULE ORAL at 10:03

## 2019-01-06 RX ADMIN — HYDROCODONE BITARTRATE AND ACETAMINOPHEN 1 TABLET: 5; 325 TABLET ORAL at 11:16

## 2019-01-06 RX ADMIN — HEPARIN SODIUM 5000 UNITS: 5000 INJECTION INTRAVENOUS; SUBCUTANEOUS at 05:55

## 2019-01-06 RX ADMIN — Medication 5 ML: at 21:45

## 2019-01-06 RX ADMIN — ATORVASTATIN CALCIUM 10 MG: 10 TABLET, FILM COATED ORAL at 21:44

## 2019-01-06 RX ADMIN — VANCOMYCIN HYDROCHLORIDE 125 MG: KIT at 18:06

## 2019-01-06 RX ADMIN — HYDROCODONE BITARTRATE AND ACETAMINOPHEN 1 TABLET: 5; 325 TABLET ORAL at 06:12

## 2019-01-06 RX ADMIN — FINASTERIDE 5 MG: 5 TABLET, FILM COATED ORAL at 10:03

## 2019-01-06 NOTE — PROGRESS NOTES
Hospitalist Progress Note NAME: Justyna Thompson :  1945 MRN:  [id number] Interim Hospital Summary: 75 y.o. male whom presented on 2019 with Assessment / Plan: 
C difficile infection POA with persistent diarrhea 
- one watery stool over night 
- CT abdomen did not show significant colitis 
- no fever or leukocytosis  
- continue withl Vancomycin (last dose 2019) 
- discussed with patient and family importance of hand washing  
  
Acute kidney injury POA admit creat 3.79 CKD stage 3 POA baseline creat 1.1 to 1.3 in 2018 Acute urinary retention 2018  Hx of renal CA s/p partial nephrectomy 
- renal ultra sound:  
  1. Bilateral renal cysts. Renal parenchyma otherwise unremarkable. 2. Enlarged prostate 
- caldwell was inserted on 2019 due to large PVR (about 1000ml) 
- continue with flomax and proscar. 
- pt needs to follow up with  in one week 
- d/c IVF 
- Holding ACEi and lasix; will resume these medication upon discharge 
- creat trending down 1.43 -> 1.16 (was 3.79 on admission) 
  
Neck pain POA 
DJD 
- limited cervical ROM due to severe pain. - Cervical spine x-ray: kyphosis with extensive multilevel degenerative chagnes - PT recommends outpatient therapy only. Pt was having difficult time getting out of bed due to severe pain this morning. No numbness/tingling sensation. - appreciate orthopedic team input; steroid started. Pt is feeling a little better today than yesterday 
- ultram PRN for pain 
  
Essential HTN POA 
- Cont amlodipine, carvedilol 
- Holding ACE in and lasix 
- PRN hydralazine 
  
Hyperlipidemia POA  
- Statin 
  
DM type 2 POA 
- Hold home glipizide 
- hgbA1C 7.5 on . Will recheck in am 
- check qac/qhs blood glucose and follow SSI and POCs 
- fasting blood glucose has been less than 150  
  
Hx inflammatory arthritis Gout 
- Hold home allopurinol in setting of KAITLIN; will resume if pt starts to c/o gouty pain 
  
 Hx of remote tobacco use - Encouraged continued abstinence 
  
Code Status: Full Surrogate Decision Maker: wife 
  
DVT Prophylaxis: heparin sq GI Prophylaxis: not indicated 
  
Baseline: Independent ADLs, lives at home with wife. Uses a cane to ambulate when out of the house 
  
Disposition; home with family Subjective: Chief Complaint / Reason for Physician Visit \"my tummy feels ok, my neck pain is still there but not as bad as yesterday\". Discussed with RN events overnight. Review of Systems: 
Symptom Y/N Comments  Symptom Y/N Comments Fever/Chills n   Chest Pain n   
Poor Appetite    Edema Cough    Abdominal Pain Sputum    Joint Pain y Neck pain SOB/OG n   Pruritis/Rash Nausea/vomit n   Tolerating PT/OT Diarrhea    Tolerating Diet Constipation    Other Could NOT obtain due to:   
 
Objective: VITALS:  
Last 24hrs VS reviewed since prior progress note. Most recent are: 
Patient Vitals for the past 24 hrs: 
 Temp Pulse Resp BP SpO2  
01/06/19 0810 98 °F (36.7 °C) 89 18 156/90 95 % 01/06/19 0409 98.2 °F (36.8 °C) 99 18 156/76 96 % 01/06/19 0044 98.8 °F (37.1 °C) (!) 101 17 170/85 94 % 01/05/19 1534 98.7 °F (37.1 °C) 90 16 175/82 93 % 01/05/19 1215 98.2 °F (36.8 °C) 82 18 146/77 95 % Intake/Output Summary (Last 24 hours) at 1/6/2019 1102 Last data filed at 1/6/2019 1005 Gross per 24 hour Intake 1765 ml Output 1350 ml Net 415 ml PHYSICAL EXAM: 
General: WD, WN. Alert, cooperative, no acute distress   
EENT:  EOMI. Anicteric sclerae. MMM, still limited cervical ROM Resp:  CTA bilaterally, no wheezing or rales. No accessory muscle use CV:  Regular  rhythm,  No edema GI:  Soft, Non distended, Non tender.  +Bowel sounds Neurologic:  Alert and oriented X 3, normal speech, Psych:   Good insight. Not anxious nor agitated Skin:  No rashes. No jaundice Reviewed most current lab test results and cultures  YES 
 Reviewed most current radiology test results   YES Review and summation of old records today    NO Reviewed patient's current orders and MAR    YES 
PMH/SH reviewed - no change compared to H&P 
________________________________________________________________________ Care Plan discussed with: 
  Comments Patient y Family  y Wife at bedside RN Care Manager Consultant Multidiciplinary team rounds were held today with , nursing, pharmacist and clinical coordinator. Patient's plan of care was discussed; medications were reviewed and discharge planning was addressed. ________________________________________________________________________ Total NON critical care TIME:  25   Minutes Total CRITICAL CARE TIME Spent:   Minutes non procedure based Comments >50% of visit spent in counseling and coordination of care    
________________________________________________________________________ Andrzej Ramesh NP Procedures: see electronic medical records for all procedures/Xrays and details which were not copied into this note but were reviewed prior to creation of Plan. LABS: 
I reviewed today's most current labs and imaging studies. Pertinent labs include: 
Recent Labs 01/06/19 
0345 01/05/19 
6541 WBC 5.0 4.1 HGB 10.6* 10.8* HCT 34.2* 34.9*  
 178 Recent Labs 01/06/19 
0345 01/05/19 
9927 01/04/19 
3572  146* 142  
K 4.5 4.4 4.8  
* 118* 114* CO2 17* 20* 20* * 112* 170* BUN 17 20 39* CREA 1.16 1.43* 2.03* CA 8.0* 8.2* 8.2* MG  --   --  1.9 ALB 2.4*  --   --   
TBILI 0.5  --   --   
SGOT 47*  --   --   
ALT 24  --   --   
 
 
Signed: )Andrzej Ramesh, NP

## 2019-01-06 NOTE — PROGRESS NOTES
Bedside and Verbal shift change report given to [place] (oncoming nurse) by Dave Lara RN (offgoing nurse). Report included the following information SBAR, Kardex, Intake/Output, MAR, Accordion, Recent Results and Med Rec Status.

## 2019-01-07 ENCOUNTER — HOME HEALTH ADMISSION (OUTPATIENT)
Dept: HOME HEALTH SERVICES | Facility: HOME HEALTH | Age: 74
End: 2019-01-07

## 2019-01-07 VITALS
OXYGEN SATURATION: 96 % | TEMPERATURE: 97.6 F | DIASTOLIC BLOOD PRESSURE: 82 MMHG | HEIGHT: 72 IN | BODY MASS INDEX: 26.58 KG/M2 | WEIGHT: 196.21 LBS | RESPIRATION RATE: 17 BRPM | HEART RATE: 77 BPM | SYSTOLIC BLOOD PRESSURE: 150 MMHG

## 2019-01-07 PROBLEM — M19.90 DJD (DEGENERATIVE JOINT DISEASE): Status: ACTIVE | Noted: 2019-01-07

## 2019-01-07 PROBLEM — E11.9 DM II (DIABETES MELLITUS, TYPE II), CONTROLLED (HCC): Status: ACTIVE | Noted: 2019-01-07

## 2019-01-07 PROBLEM — A04.72 C. DIFFICILE DIARRHEA: Status: ACTIVE | Noted: 2019-01-07

## 2019-01-07 PROBLEM — M54.2 NECK PAIN: Status: ACTIVE | Noted: 2019-01-07

## 2019-01-07 PROBLEM — R33.9 URINARY RETENTION: Status: ACTIVE | Noted: 2019-01-07

## 2019-01-07 LAB
ANION GAP SERPL CALC-SCNC: 8 MMOL/L (ref 5–15)
BUN SERPL-MCNC: 19 MG/DL (ref 6–20)
BUN/CREAT SERPL: 18 (ref 12–20)
CALCIUM SERPL-MCNC: 8.3 MG/DL (ref 8.5–10.1)
CHLORIDE SERPL-SCNC: 114 MMOL/L (ref 97–108)
CO2 SERPL-SCNC: 21 MMOL/L (ref 21–32)
CREAT SERPL-MCNC: 1.05 MG/DL (ref 0.7–1.3)
GLUCOSE BLD STRIP.AUTO-MCNC: 155 MG/DL (ref 65–100)
GLUCOSE BLD STRIP.AUTO-MCNC: 161 MG/DL (ref 65–100)
GLUCOSE SERPL-MCNC: 185 MG/DL (ref 65–100)
POTASSIUM SERPL-SCNC: 4.8 MMOL/L (ref 3.5–5.1)
SERVICE CMNT-IMP: ABNORMAL
SERVICE CMNT-IMP: ABNORMAL
SODIUM SERPL-SCNC: 143 MMOL/L (ref 136–145)

## 2019-01-07 PROCEDURE — 74011250637 HC RX REV CODE- 250/637: Performed by: NURSE PRACTITIONER

## 2019-01-07 PROCEDURE — 82962 GLUCOSE BLOOD TEST: CPT

## 2019-01-07 PROCEDURE — 80048 BASIC METABOLIC PNL TOTAL CA: CPT

## 2019-01-07 PROCEDURE — 36415 COLL VENOUS BLD VENIPUNCTURE: CPT

## 2019-01-07 PROCEDURE — 74011250637 HC RX REV CODE- 250/637: Performed by: INTERNAL MEDICINE

## 2019-01-07 PROCEDURE — 74011250636 HC RX REV CODE- 250/636: Performed by: INTERNAL MEDICINE

## 2019-01-07 PROCEDURE — 77030010545

## 2019-01-07 RX ORDER — ALLOPURINOL 300 MG/1
300 TABLET ORAL DAILY
Qty: 180 TAB | Refills: 4 | Status: SHIPPED | OUTPATIENT
Start: 2019-01-07 | End: 2019-01-23 | Stop reason: ALTCHOICE

## 2019-01-07 RX ORDER — PREDNISONE 10 MG/1
20 TABLET ORAL
Qty: 5 TAB | Refills: 0 | Status: SHIPPED | OUTPATIENT
Start: 2019-01-07

## 2019-01-07 RX ORDER — FINASTERIDE 5 MG/1
5 TABLET, FILM COATED ORAL DAILY
Qty: 30 TAB | Refills: 0 | Status: SHIPPED | OUTPATIENT
Start: 2019-01-07

## 2019-01-07 RX ORDER — VANCOMYCIN HYDROCHLORIDE 250 MG/5ML
125 POWDER, FOR SOLUTION ORAL EVERY 6 HOURS
Qty: 50 ML | Refills: 0 | Status: SHIPPED | OUTPATIENT
Start: 2019-01-07 | End: 2019-01-12

## 2019-01-07 RX ADMIN — HEPARIN SODIUM 5000 UNITS: 5000 INJECTION INTRAVENOUS; SUBCUTANEOUS at 06:08

## 2019-01-07 RX ADMIN — FINASTERIDE 5 MG: 5 TABLET, FILM COATED ORAL at 09:31

## 2019-01-07 RX ADMIN — HYDROCODONE BITARTRATE AND ACETAMINOPHEN 1 TABLET: 5; 325 TABLET ORAL at 09:30

## 2019-01-07 RX ADMIN — VANCOMYCIN HYDROCHLORIDE 125 MG: KIT at 12:34

## 2019-01-07 RX ADMIN — Medication 5 ML: at 06:09

## 2019-01-07 RX ADMIN — TAMSULOSIN HYDROCHLORIDE 0.4 MG: 0.4 CAPSULE ORAL at 09:30

## 2019-01-07 RX ADMIN — VANCOMYCIN HYDROCHLORIDE 125 MG: KIT at 06:08

## 2019-01-07 RX ADMIN — VITAMIN D, TAB 1000IU (100/BT) 1000 UNITS: 25 TAB at 09:31

## 2019-01-07 RX ADMIN — CARVEDILOL 25 MG: 12.5 TABLET, FILM COATED ORAL at 09:30

## 2019-01-07 RX ADMIN — AMLODIPINE BESYLATE 5 MG: 5 TABLET ORAL at 09:30

## 2019-01-07 NOTE — PROGRESS NOTES
Problem: Falls - Risk of 
Goal: *Absence of Falls Document Alejandro Mcgarry Fall Risk and appropriate interventions in the flowsheet. Outcome: Progressing Towards Goal 
Fall Risk Interventions: 
Mobility Interventions: Communicate number of staff needed for ambulation/transfer Medication Interventions: Evaluate medications/consider consulting pharmacy, Patient to call before getting OOB

## 2019-01-07 NOTE — DISCHARGE SUMMARY
Hospitalist Discharge Summary     Patient ID:  Joe Benz  [id number]  80 y.o.  1945    PCP on record: Jake Downing MD    Admit date: 1/2/2019  Discharge date and time: 1/7/2019      DISCHARGE DIAGNOSIS:  C difficile infection POA with persistent diarrhea  Acute kidney injury POA admit creat 3.79  CKD stage 3 POA baseline creat 1.1 to 1.3 in 2018  Acute urinary retention 1/4/2018   Hx of renal CA s/p partial nephrectomy  Neck pain POA  DJD  Essential HTN POA  Hyperlipidemia POA   DM type 2 POA  Hx inflammatory arthritis  Gout  Hx of remote tobacco use    CONSULTATIONS:  IP CONSULT TO ORTHOPEDIC SURGERY    Excerpted HPI from H&P of Chance Blackwood DO:  Joe Benz is a 75 y.o. male with PMhx of gout, HTN, DM2, inflammatory arthritis, Hx or renal CA, and CKD who presents to the ED with primary complaint of voluminous watery diarrhea, reporting easily more than 5 BMs per day--\"I lose count\". He admits to taking antibiotics which were started recently for a cough and cold. He admits occasional chills, denies any fevers/nausea/vomiting. Pt also denies any recent travel, exotic foods, or known proximity to sick contacts. He denies gross blood per rectum or dark stools.           ______________________________________________________________________  DISCHARGE SUMMARY/HOSPITAL COURSE:  for full details see H&P, daily progress notes, labs, consult notes. C difficile infection POA with persistent diarrhea  - one watery stool over night  - CT abdomen did not show significant colitis  - no fever or leukocytosis   - continue withl Vancomycin (last dose 1/12/2019)  - discussed with patient and family importance of hand washing      Acute kidney injury POA admit creat 3.79  CKD stage 3 POA baseline creat 1.1 to 1.3 in 2018  Acute urinary retention 1/4/2018   Hx of renal CA s/p partial nephrectomy  - renal ultra sound:     1. Bilateral renal cysts.  Renal parenchyma otherwise unremarkable.    2. Enlarged prostate  - caldwell was inserted on 1/4/2019 due to large PVR (about 1000ml)  - continue with flomax and proscar.  - pt needs to follow up with  in one week  - resume these medication upon discharge  - creat trending down 1.43 -> 1.16 -> 1.0 (was 3.79 on admission)  - home health set up for caldwell cath education     Neck pain POA  DJD  - limited cervical ROM. Neck pain improved with steroid. Pt will complete steroid taper, participate with PT/OT, and follow up with Dr. Evelyn Rene as outpatient. - Cervical spine x-ray: kyphosis with extensive multilevel degenerative chagnes  - PT recommends outpatient therapy only. Pt was having difficult time getting out of bed due to severe pain this morning. No numbness/tingling sensation.      Essential HTN POA  - Cont amlodipine, carvedilol  - continue with ACE and lasix     Hyperlipidemia POA   - Statin     DM type 2 POA  - Hold home glipizide  - hgbA1C 7.5 on 2016. Will recheck in am  - check qac/qhs blood glucose and follow SSI and POCs  - fasting blood glucose has been less than 150      Hx inflammatory arthritis  Gout  - decreased the dose of allopurinol     Hx of remote tobacco use  - Encouraged continued abstinence                 _______________________________________________________________________  Patient seen and examined by me on discharge day. Pertinent Findings:  Gen:    Not in distress  Chest: Clear lungs  CVS:   Regular rhythm. No edema  Abd:  Soft, not distended, not tender  Neuro:  Alert, orient x 4  _______________________________________________________________________  DISCHARGE MEDICATIONS:   Current Discharge Medication List      START taking these medications    Details   finasteride (PROSCAR) 5 mg tablet Take 1 Tab by mouth daily. Qty: 30 Tab, Refills: 0      vancomycin (FIRVANQ) 50 mg/mL oral solution Take 2.5 mL by mouth every six (6) hours for 5 days.   Qty: 50 mL, Refills: 0      predniSONE (DELTASONE) 10 mg tablet Take 20 mg by mouth daily (with breakfast). 2 tablet once a day for 2 days  1 tablet once a day for 2 days  Qty: 5 Tab, Refills: 0      OTHER PT eval and treat  DX: Cervical pain  Qty: 1 Act, Refills: 0         CONTINUE these medications which have CHANGED    Details   allopurinol (ZYLOPRIM) 300 mg tablet Take 1 Tab by mouth daily. Qty: 180 Tab, Refills: 4         CONTINUE these medications which have NOT CHANGED    Details   VENTOLIN HFA 90 mcg/actuation inhaler Take 2 Puffs by inhalation every six (6) hours as needed for Shortness of Breath. Refills: 0      gabapentin (NEURONTIN) 300 mg capsule Take 300 mg by mouth three (3) times daily. cyanocobalamin (VITAMIN B-12) 1,000 mcg tablet Take 1,000 mcg by mouth daily. amLODIPine (NORVASC) 5 mg tablet TAKE 1 TABLET BY MOUTH EVERY MORNING  Refills: 3      magnesium oxide 250 mg tablet TAKE 1 TABLET BY MOUTH AS NEEDED ONCE A DAY ORALLY 30 DAY(S)  Refills: 2      aspirin 81 mg chewable tablet Take 1 Tab by mouth daily. Qty: 30 Tab, Refills: 11      carvedilol (COREG) 25 mg tablet Take 1 Tab by mouth two (2) times daily (with meals). Qty: 30 Tab, Refills: 11      potassium chloride SR (KLOR-CON 10) 10 mEq tablet Take 1 Tab by mouth daily. Qty: 30 Tab, Refills: 11      furosemide (LASIX) 20 mg tablet Take 1 Tab by mouth daily. Qty: 30 Tab, Refills: 11      cholecalciferol (VITAMIN D3) 1,000 unit cap Take 1,000 Units by mouth daily. lisinopril (PRINIVIL, ZESTRIL) 20 mg tablet Take 20 mg by mouth daily. glipiZIDE SR (GLUCOTROL XL) 5 mg CR tablet Take 5 mg by mouth two (2) times a day. pantoprazole (PROTONIX) 40 mg tablet Take 40 mg by mouth Daily (before breakfast). atorvastatin (LIPITOR) 10 mg tablet TAKE 1 TABLET BY MOUTH ONCE A DAY  Refills: 2      tamsulosin (FLOMAX) 0.4 mg capsule Take 0.4 mg by mouth daily.          STOP taking these medications       potassium chloride SA (MICRO-K) 10 mEq capsule Comments:   Reason for Stopping: My Recommended Diet, Activity, Wound Care, and follow-up labs are listed in the patient's Discharge Insturctions which I have personally completed and reviewed.     _______________________________________________________________________  DISPOSITION:    Home with Family:    Home with HH/PT/OT/RN: y   SNF/LTC:    JANEL:    OTHER:        Condition at Discharge:  Stable  _______________________________________________________________________  Follow up with:   PCP : Chandler Landeros MD  Follow-up Information     Follow up With Specialties Details Why Contact Info    Chandler Landeros MD Family Practice Go on 1/10/2019 For hospital follow up appointment at 2:40PM [facility]  [phone]      Kenna Osman MD Urology Schedule an appointment as soon as possible for a visit 1 week 60 [address] [phone]      Gerardo Mcduffie MD Orthopedic Surgery Schedule an appointment as soon as possible for a visit 1-2 weeks [address]  [address] [facility] Waller MD Rheumatology, Pediatric Rheumatology, Internal Medicine Go on 1/23/2019 For scheduled appointment at [address] [place] [address] Dr [address] [phone]  [phone]                Total time in minutes spent coordinating this discharge (includes going over instructions, follow-up, prescriptions, and preparing report for sign off to her PCP) :  31 minutes    Signed:  Andrzej Rios NP

## 2019-01-07 NOTE — PROGRESS NOTES
Discharge instructions, follow up appointments and prescriptions reviewed with patient and patient's spouse both of whom verbalized an understanding. An opportunity for questions and clarification were provided for.

## 2019-01-07 NOTE — DISCHARGE INSTRUCTIONS
Patient Discharge Instructions     Pt Name  Migue Olivares   Date of Birth 1945   Age  75 y.o. Medical Record Number  [id number]   PCP Berl Peabody, MD    Admit date:  1/2/2019 @    [address] [place] [address]    Room Number  3258/01   Date of Discharge 1/7/2019     Admission Diagnoses:     C. difficile diarrhea        No Known Allergies     You were admitted to [address] [place] [address] for  C. difficile diarrhea    YOUR OTHER MEDICAL DIAGNOSES INCLUDE (BUT NOT LIMITED TO ):  Present on Admission:   Acute renal failure (ARF) ([address]   C. difficile diarrhea   KAITLIN (acute kidney injury) ([address]   Neck pain   DJD (degenerative joint disease)   DM II (diabetes mellitus, type II), controlled ([address]      DIET:  Diabetic Diet       Recommended activity: Activity as tolerated  Follow up : Follow-up Information     Follow up With Specialties Details Why Contact Info    Berl Peabody, MD Family Practice Schedule an appointment as soon as possible for a visit 1 week 84 [facility] [address]      Rosibel Metcalf MD Urology Schedule an appointment as soon as possible for a visit 1 week 60 [facility] [address] [facility]      Chula Holman MD Orthopedic Surgery Schedule an appointment as soon as possible for a visit 1-2 weeks [address] [facility]  [address] [place] [address],[address]  [address] [place] [address]  [phone]          Patient Education     Learning About How to Care for an Indwelling Urinary Catheter  Learning About How to Care for an Indwelling Urinary Catheter    A urinary catheter is a flexible plastic tube used to drain urine from the bladder when a person cannot urinate. A doctor will place the catheter into the bladder by inserting it through the urethra. The urethra is the opening that carries urine from the bladder to the outside of the body.   When the catheter is in the bladder, a small balloon is used to keep the catheter in place. The catheter lets urine drain from the bladder into a bag. The bag is usually attached to the thigh. Urinary catheters can be used in both men and women. A catheter that stays in place for a longer period of time is called an indwelling catheter. A catheter may be needed because of certain medical conditions. These include an enlarged prostate or problems controlling urine. It may be used after surgery on the pelvis or urinary tract. Urinary catheters are also used when the lower part of the body is paralyzed. When helping a loved one with a catheter, try to be relaxed. Caring for a catheter can be embarrassing for both of you. This may be especially true if you are caring for someone of the opposite sex. If you are calm and don't seem embarrassed, the person may feel more comfortable. How do you take care of the catheter? Wear disposable gloves when handling someone's catheter. Make sure to follow all of the instructions the doctor has given. And always wash your hands before and after you're done. Here are some other things to remember when caring for someone's catheter:  · Make sure that urine is running out of the catheter into the urine collection bag. And make sure that the catheter tubing does not get twisted or bent. · Keep the urine collection bag below the level of the bladder. At night it may be helpful to hang the bag on the side of the bed. · Make sure that the urine collection bag does not drag and pull on the catheter. · It is okay to shower with a catheter and urine collection bag in place, unless the doctor says not to. · Check for swelling or signs of infection in the area around the catheter. Signs of infection include pus or irritated, swollen, red, or tender skin. · Clean the area around the catheter twice a day with soap and water. Dry with a clean towel afterward. · Do not apply powder or lotion to the skin around the catheter.   · Do not tug or pull on the catheter. · Sexual intercourse may still be possible for individuals who wear a catheter. It is best to talk with a doctor about options. How do you empty the bag? The urine collection bag needs to be emptied regularly. It is best to empty the bag when it's about half full or at bedtime. If the doctor has asked you to measure the amount of urine, do that before you empty the urine into the toilet. When you are ready to empty the bag, follow these steps:  1. Put on disposable gloves. 2. Remove the drain spout from its sleeve at the bottom of the collection bag. Open the valve on the spout. 3. Let the urine flow out of the bag and into the toilet or a container. Do not let the tubing or drain spout touch anything. 4. After you empty the bag, wipe off any liquid on the end of the drain spout. Close the valve and put the drain spout back into its sleeve. 5. Remove your gloves and throw them away. 6. Wash your hands with soap and water. How do you care for someone after the catheter is removed? After the catheter is taken out, the person may have trouble urinating. If this happens, try helping them sit in a few inches of warm water (sitz bath). If the urge to urinate comes during the sitz bath, it may be easier for them to urinate while still in the bath. Some burning may happen the first few times the person urinates. If the burning lasts longer, it may be a sign of an infection. If the catheter causes irritation or a rash, wearing loose, cotton underwear may help. Watch closely for changes in the person's health, and be sure to contact their doctor if you notice any problems. Where can you learn more? Go to http://nimisha-joshua.info/. Enter U335 in the search box to learn more about \"Learning About How to Care for an Indwelling Urinary Catheter. \"  Current as of: April 19, 2018  Content Version: 11.8  © 3852-1233 Healthwise, Incorporated.  Care instructions adapted under license by [employer] (which disclaims liability or warranty for this information). If you have questions about a medical condition or this instruction, always ask your healthcare professional. [address] any warranty or liability for your use of this information. Learning About Clostridium Difficile Infection  What is Clostridium difficile? Clostridium difficile (also called C. diff) is a type of bacteria that can cause swelling and irritation of the large intestine. This can cause diarrhea, fever, and belly cramps. Infection by C. diff is most common in people who are taking antibiotics or who took them in the past few weeks. It is also more likely in older people and people who are getting chemotherapy for cancer. Though the infection can be mild, it can become serious, especially for people who have a weak immune system. The large intestine normally contains many good bacteria that keep it healthy and don't cause disease. When you take an antibiotic to kill specific bacteria that are causing an illness, your antibiotic may also kill the good bacteria. This can allow C. diff bacteria to grow and release harmful toxins. The inflammation of the large intestine, called colitis, is caused by these toxins. This is a serious infection that needs treatment. The toxins can also cause the colon to swell to many times its normal size. If that happens, it's very serious and needs emergency treatment. If you are still taking an antibiotic, your doctor may have you stop taking it because it may have led to the C. diff infection. Your doctor may then give you a different antibiotic that targets C. diff. How is it diagnosed? Your doctor may think you have C. diff colitis if both of the following are true:  · You are taking, or you recently took, antibiotics. · You have symptoms of the illness. These may include:  ? Watery diarrhea (which may contain blood or pus). ?  Swelling, cramps, pain, or tenderness in the belly. ? Fever. ? Dehydration. To confirm the diagnosis, a sample of your stool will be tested. The test will check for the bacteria by looking for its DNA. Another test may be done to look for the toxins that C. diff produces. Your doctor may look at the inside of your colon through a thin, lighted tube called a colonoscope. In the most serious cases, the doctor may see patches of yellow and white tissue on the inside of the colon. How does C. diff get passed to other people? C. diff can be passed from person to person. It can also be passed from person to object to person. It's important that your infection does not spread to other people. Preventing the spread of C. diff is a top health concern in hospitals and long-term care facilities. If you have a C. diff infection, you can spread it if you don't wash your hands well enough with soap and water after you use the bathroom. Anything you touch, like a door handle, bed rail, or phone, can then carry the bacteria. C. diff can live on objects for a very long time. The infection spreads to other people when they touch an object that has C. diff on it and then use their hands to eat or rub their faces. Health care workers can pass C. diff from room to room in a hospital or a long-term care facility. Visitors can also spread it. How can you avoid spreading C. diff infection? When you have C. diff, you and everyone around you must take special care to avoid spreading it. You must use extra care after you use the bathroom. The best way to prevent spreading the infection is to wash your hands well and often. The rubbing and rinsing action helps wash the bacteria from your skin. · Wash your hands with running water and soap. · Rub your hands together for at least 20 seconds. · Pay special attention to your wrists, the backs of your hands, between your fingers, and under your fingernails. · Don't touch the faucet with your hand. Use a paper towel to turn off the water. Don't use an alcohol-based hand  instead of washing your hands with soap and water.  will not kill C. diff. In the hospital  · Ask your nurse if visitors need to wear special gowns and gloves. · Remind your visitors to wash their hands before they visit and as they leave your room. · Expect the hospital staff to use extra precautions. That means following safe hand-washing routines and wearing gloves and gowns. They may wear masks when cleaning. If it seems that a staff person isn't being as careful as you expect, ask what steps the hospital uses to control the spread of your infection. This might help remind the person to be more careful. At home  After your diarrhea is better, you are much less likely to spread a C. diff infection. But you still need to take extra care to keep your home clean. · Regularly clean bathroom surfaces with a bleach solution to kill any C. diff spores. To dilute household bleach, follow the directions on the label. · Wash your hands often and well. This is most important after you use the bathroom and before you make and eat food. · Remind everyone in your home to also wash their hands often. If you start having diarrhea again, call your doctor right away. Your doctor will tell you when you no longer need to take special care to prevent spreading C. diff. Follow-up care is a key part of your treatment and safety. Be sure to make and go to all appointments, and call your doctor if you are having problems. It's also a good idea to know your test results and keep a list of the medicines you take. Where can you learn more? Go to http://nimisha-joshua.info/. Enter G435 in the search box to learn more about \"Learning About Clostridium Difficile Infection. \"  Current as of: November 18, 2017  Content Version: 11.8  © 1131-2194 Healthwise, Incorporated.  Care instructions adapted under license by [facility] (which disclaims liability or warranty for this information). If you have questions about a medical condition or this instruction, always ask your healthcare professional. [address] any warranty or liability for your use of this information. · It is important that you take the medication exactly as they are prescribed. · Keep your medication in the bottles provided by the pharmacist and keep a list of the medication names, dosages, and times to be taken in your wallet. · Do not take other medications without consulting your doctor. ADDITIONAL INFORMATION: If you experience any of the following symptoms or have any health problem not listed below, then please call your primary care physician or return to the emergency room if you cannot get hold of your doctor: Fever, chills, nausea, vomiting, diarrhea, change in mentation, falling, bleeding, shortness of breath. I understand that if any problems occur once I am discharged, I am supposed to call my Primary care physician for further care or seek help in the Emergency Department at the nearest Healthcare facility. I have had an opportunity to discuss my clinical issues with my doctor and nursing staff. I understand and acknowledge receipt of the above instructions.                                                                                                                                            Physician's or R.N.'s Signature                                                            Date/Time                                                                                                                                              Patient or Representative Signature                                                 Date/Time

## 2019-01-07 NOTE — PROGRESS NOTES
ORTHO  SPINE PROGRESS NOTE 2019 Admit Date: 2019 Admit Diagnosis: Acute renal failure (ARF) ([facility] Procedure:  
Post Op day: * No surgery found * Subjective:  
 
Alex De Santiago is a patient who has complaints Of pain in the neck with no radiation of pain down the arms. He was evaluated by our office back in December for low back pain postop lumbar fusion. Admitted to the hospital with neck pain and diarrhea. Found to have C diff and has been treated for that and making improvements. Continues to admit to pain in the neck worse with movement. Review of Systems: Pertinent items are noted in HPI. Objective:  
 
PT/OT:  
Distance Ambulated:          
Time Ambulated (min):       
Ambulation Response: Activity Response: Fairly tolerated Assistive Device:              Assistive Device: Fall prevention device Vital Signs:   
Blood pressure 150/82, pulse 77, temperature 97.6 °F (36.4 °C), resp. rate 17, height 6' (1.829 m), weight 89 kg (196 lb 3.4 oz), SpO2 96 %. Temp (24hrs), Av.9 °F (36.6 °C), Min:97.6 °F (36.4 °C), Max:98.1 °F (36.7 °C) No intake/output data recorded.  1901 -  0700 In: 2085 [P.O.:560; I.V.:1525] Out: 1950 [[id number]] LAB:   
Recent Labs 19 
0345 HGB 10.6* WBC 5.0  
 Wound/Drain Assessment: 
Drain:   
 
Dressing:  
 
Physical Exam: 
Neurological: no deficit 5/5 strength bilateral upper extremity Assessment:  
  
Patient Active Problem List  
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  
 GI bleed K92.2  Bilateral leg and foot pain M79.604, M79.605, M79.671, O65.903  KAITLIN (acute kidney injury) ([facility] N17.9  Chronic gouty arthropathy with tophi M1A.00X1  Chest pressure R07.89  Long term (current) use of systemic steroids Z79.52  
 Acute renal failure (ARF) ([facility]) N17.9  C. difficile diarrhea A04.72  
 Neck pain M54.2  DJD (degenerative joint disease) M19.90  
 DM II (diabetes mellitus, type II), controlled ([address] E11.9  
 Urinary retention R33.9 Plan:  
 
Continue PT/OT/Rehab Discontinue:  
Consult: PT  and OT 
 would recommend outpatient physical therapy for neck pain and follow up with our office in a couple of weeks. If he is not improving at that time will consider MRI cervical spine Signed By: BRENDA Viveros

## 2019-01-07 NOTE — PROGRESS NOTES
Chart reviewed and consult received. I've contacted the CM Specialist to arrange for follow up lela't and to be placed on the AVS.  The pt lives with spouse and uses a cane, glucometer, has Medicare, PCP Dr Gia Tinoco, and is a full code. Will follow as needed. Urology has been called to arrange a follow up lela't. They stated they will call the pt an lela't. (per CM specialist). I called Dr Priya Mg office and spoke with Darnell Baires who has arranged a follow up lela't with BRENDA Champion [facility]ides on 1/14/19 at 11:20am.  I've placed this info on the AVS 
 
[facility] will follow for caldwell care and DM management Please have pt contact his family to transport him home when possible.

## 2019-01-07 NOTE — PROGRESS NOTES
Bedside and Verbal shift change report given to [facility] (oncoming nurse) by Amy Cuellar (offgoing nurse). Report included the following information Kardex, MAR and Recent Results.

## 2019-01-07 NOTE — ROUTINE PROCESS
PCP [facility] apt scheduled with Dr. Duke Long on 1/10/19 at 2:40PM.  Pt has Rheumatology apt scheduled on 1/23/19 at 9:30AM.  Apts added to AVS

## 2019-01-08 ENCOUNTER — HOME CARE VISIT (OUTPATIENT)
Dept: SCHEDULING | Facility: HOME HEALTH | Age: 74
End: 2019-01-08

## 2019-01-08 LAB
O+P SPEC MICRO: NORMAL
O+P STL CONC: NORMAL
SPECIMEN SOURCE: NORMAL

## 2019-01-08 PROCEDURE — G0495 RN CARE TRAIN/EDU IN HH: HCPCS

## 2019-01-23 ENCOUNTER — OFFICE VISIT (OUTPATIENT)
Dept: RHEUMATOLOGY | Age: 74
End: 2019-01-23

## 2019-01-23 VITALS
HEART RATE: 81 BPM | WEIGHT: 198.2 LBS | TEMPERATURE: 98 F | DIASTOLIC BLOOD PRESSURE: 67 MMHG | BODY MASS INDEX: 26.84 KG/M2 | RESPIRATION RATE: 20 BRPM | SYSTOLIC BLOOD PRESSURE: 126 MMHG | OXYGEN SATURATION: 92 % | HEIGHT: 72 IN

## 2019-01-23 DIAGNOSIS — M10.9 GOUTY ARTHRITIS: ICD-10-CM

## 2019-01-23 DIAGNOSIS — M10.9 GOUT, ARTHRITIS: Primary | ICD-10-CM

## 2019-01-23 RX ORDER — GLIPIZIDE 10 MG/1
10 TABLET ORAL 2 TIMES DAILY
COMMUNITY

## 2019-01-23 RX ORDER — ALLOPURINOL 300 MG/1
600 TABLET ORAL DAILY
Qty: 60 TAB | Refills: 6 | Status: SHIPPED | OUTPATIENT
Start: 2019-01-23

## 2019-01-23 NOTE — PROGRESS NOTES
RHEUMATOLOGY PROBLEM LIST AND CHIEF COMPLAINT 1. Gout - crystal-proven (9/2016), diagnosed 3 years ago; most recent uric acid 2.7 (07/2018) 2. CPPD INTERVAL HISTORY This is a 75 y.o.  male. Today, the patient complains of generalized pain. Location: generalized Severity:  5 on a scale of 0-10 Timing: all day Duration:  6 months Context/Associated signs and symptoms: The patient denies any gout flare since his last visit. He recently went to the hospital for treatment of C. Diff. The hospitalist decreased his Allpurinol down to 300 mg daily, which is the dose he continues today. RHEUMATOLOGY REVIEW OF SYSTEMS Positives as per history Negatives as follows: 
CONSTITUTlONAL:  Denies unexplained persistent fevers, weight change, chronic fatigue HEAD/EYES:   Denies eye redness, blurry vision or sudden loss of vision, dry eyes, HA, temporal artery pain ENT:    Denies oral/nasal ulcers, recurrent sinus infections, dry mouth RESPIRATORY:  No pleuritic pain, history of pleural effusions, hemoptysis, exertional dyspnea CARDIOVASCULAR:  Denies chest pain, history of pericardial effusions GASTRO:   Denies heartburn, esophageal dysmotility, abdominal pain, nausea, vomiting, diarrhea, blood in the stool HEMATOLOGIC:  No easy bruising, purpura, swollen lymph nodes SKIN:    Denies alopecia, ulcers, nodules, sun sensitivity, unexplained persistent rash VASCULAR:   Denies edema, cyanosis, raynaud phenomenon NEUROLOGIC:  Denies specific muscle weakness, paresthesias PSYCHIATRIC:  No sleep disturbance / snoring, depression, anxiety MSK:    No morning stiffness >1 hour, SI joint pain, persistent joint swelling, persistent joint pain PAST MEDICAL HISTORY Reviewed with patient, significant changes in medical history - yes, recent hospital visit for C. Diff PHYSICAL EXAM 
Blood pressure 126/67, pulse 81, temperature 98 °F (36.7 °C), temperature source Oral, resp. rate 20, height 6' (1.829 m), weight 198 lb 3.2 oz (89.9 kg), SpO2 92 %. GENERAL APPEARANCE: Well-nourished adult in no acute distress. Walking with a cane. EYES: No scleral erythema, conjunctival injection. ENT: No oral ulcer, parotid enlargement. NECK: No adenopathy, thyroid enlargement. CARDIOVASCULAR: Heart rhythm is regular. No murmur, rub, gallop. CHEST: Normal vesicular breath sounds. No wheezes, rales, pleural friction rubs. ABDOMINAL: The abdomen is soft and nontender. Liver and spleen are nonpalpable. Bowel sounds are normal. 
EXTREMITIES: There is no evidence of clubbing, cyanosis, edema. SKIN: No rash, palpable purpura, digital ulcer, abnormal thickening. NEUROLOGICAL: Normal gait and station, full strength in upper and lower extremities, normal sensation to light touch. MUSCULOSKELETAL:  
Upper extremities - OA changes noted. B/L Hands mild dROM, no synovitis. Lower extremities - full range of motion, no tenderness, no swelling, no synovial thickening and no deformity of joints LABS, RADIOLOGY AND PROCEDURES Previous labs reviewed -Yes Uric Acid 12/2017 - 5.2 
1/2018 - 4.6 
4/2018 - 3.0 ASSESSMENT 1. Gout (Established problem -  Complete Response) - His gout is currently stable. I want him to increase the Allopurinol back to 600 mg daily, which is the dose that works to control his disease. We discussed the importance of continuing 600 mg daily. I sent a message to his hospitalist asking why his Allopurinol was decreased. Return in 1 year for a follow up. 2. Osteoarthritis - We did not discuss this issue today. PLAN 1. Allopurinol 600 mg daily 2. Return in 1 year Linh Ortiz MD 
Adult and Pediatric Rheumatology [facility], [address], Phone [phone], Fax [phone] E-mail: Pat Sadler  of Pediatrics Department of Pediatrics, [facility] of [address] [place] [address], [address] [place] [address], Phone [phone], Fax [phone] E-mail: [email or web address] There are no Patient Instructions on file for this visit. cc: 
MD Miguelito Luo 
 
Written by sky Iqbal, as dictated by Kym Abad.  Kylee Hugo M.D.

## 2019-01-27 ENCOUNTER — HOSPITAL ENCOUNTER (EMERGENCY)
Age: 74
Discharge: HOME | End: 2019-01-27
Attending: EMERGENCY MEDICINE
Payer: MEDICARE

## 2019-01-27 VITALS
OXYGEN SATURATION: 94 % | HEIGHT: 72 IN | HEART RATE: 99 BPM | RESPIRATION RATE: 12 BRPM | WEIGHT: 197.97 LBS | SYSTOLIC BLOOD PRESSURE: 126 MMHG | BODY MASS INDEX: 26.81 KG/M2 | TEMPERATURE: 98.6 F | DIASTOLIC BLOOD PRESSURE: 59 MMHG

## 2019-01-27 DIAGNOSIS — A04.72 C. DIFFICILE DIARRHEA: Primary | ICD-10-CM

## 2019-01-27 LAB
ALBUMIN SERPL-MCNC: 3.1 G/DL (ref 3.5–5)
ALBUMIN/GLOB SERPL: 0.8 {RATIO} (ref 1.1–2.2)
ALP SERPL-CCNC: 82 U/L (ref 45–117)
ALT SERPL-CCNC: 15 U/L (ref 12–78)
ANION GAP SERPL CALC-SCNC: 9 MMOL/L (ref 5–15)
AST SERPL-CCNC: 10 U/L (ref 15–37)
BASOPHILS # BLD: 0 K/UL (ref 0–0.1)
BASOPHILS NFR BLD: 0 % (ref 0–1)
BILIRUB SERPL-MCNC: 0.7 MG/DL (ref 0.2–1)
BUN SERPL-MCNC: 14 MG/DL (ref 6–20)
BUN/CREAT SERPL: 10 (ref 12–20)
CALCIUM SERPL-MCNC: 8.6 MG/DL (ref 8.5–10.1)
CHLORIDE SERPL-SCNC: 107 MMOL/L (ref 97–108)
CO2 SERPL-SCNC: 25 MMOL/L (ref 21–32)
CREAT SERPL-MCNC: 1.34 MG/DL (ref 0.7–1.3)
DIFFERENTIAL METHOD BLD: ABNORMAL
EOSINOPHIL # BLD: 0 K/UL (ref 0–0.4)
EOSINOPHIL NFR BLD: 0 % (ref 0–7)
ERYTHROCYTE [DISTWIDTH] IN BLOOD BY AUTOMATED COUNT: 15.5 % (ref 11.5–14.5)
GLOBULIN SER CALC-MCNC: 4 G/DL (ref 2–4)
GLUCOSE BLD STRIP.AUTO-MCNC: 113 MG/DL (ref 65–100)
GLUCOSE SERPL-MCNC: 132 MG/DL (ref 65–100)
HCT VFR BLD AUTO: 37 % (ref 36.6–50.3)
HGB BLD-MCNC: 11.5 G/DL (ref 12.1–17)
IMM GRANULOCYTES # BLD AUTO: 0.1 K/UL (ref 0–0.04)
IMM GRANULOCYTES NFR BLD AUTO: 1 % (ref 0–0.5)
LYMPHOCYTES # BLD: 0.8 K/UL (ref 0.8–3.5)
LYMPHOCYTES NFR BLD: 6 % (ref 12–49)
MCH RBC QN AUTO: 30.5 PG (ref 26–34)
MCHC RBC AUTO-ENTMCNC: 31.1 G/DL (ref 30–36.5)
MCV RBC AUTO: 98.1 FL (ref 80–99)
MONOCYTES # BLD: 0.8 K/UL (ref 0–1)
MONOCYTES NFR BLD: 5 % (ref 5–13)
NEUTS SEG # BLD: 12.2 K/UL (ref 1.8–8)
NEUTS SEG NFR BLD: 88 % (ref 32–75)
NRBC # BLD: 0 K/UL (ref 0–0.01)
NRBC BLD-RTO: 0 PER 100 WBC
PLATELET # BLD AUTO: 214 K/UL (ref 150–400)
PMV BLD AUTO: 12.3 FL (ref 8.9–12.9)
POTASSIUM SERPL-SCNC: 3.9 MMOL/L (ref 3.5–5.1)
PROT SERPL-MCNC: 7.1 G/DL (ref 6.4–8.2)
RBC # BLD AUTO: 3.77 M/UL (ref 4.1–5.7)
SERVICE CMNT-IMP: ABNORMAL
SODIUM SERPL-SCNC: 141 MMOL/L (ref 136–145)
WBC # BLD AUTO: 13.9 K/UL (ref 4.1–11.1)

## 2019-01-27 PROCEDURE — 74011250637 HC RX REV CODE- 250/637: Performed by: EMERGENCY MEDICINE

## 2019-01-27 PROCEDURE — 80053 COMPREHEN METABOLIC PANEL: CPT

## 2019-01-27 PROCEDURE — 99283 EMERGENCY DEPT VISIT LOW MDM: CPT

## 2019-01-27 PROCEDURE — 74011250636 HC RX REV CODE- 250/636: Performed by: PHYSICIAN ASSISTANT

## 2019-01-27 PROCEDURE — 96361 HYDRATE IV INFUSION ADD-ON: CPT

## 2019-01-27 PROCEDURE — 36415 COLL VENOUS BLD VENIPUNCTURE: CPT

## 2019-01-27 PROCEDURE — 82962 GLUCOSE BLOOD TEST: CPT

## 2019-01-27 PROCEDURE — 96360 HYDRATION IV INFUSION INIT: CPT

## 2019-01-27 PROCEDURE — 85025 COMPLETE CBC W/AUTO DIFF WBC: CPT

## 2019-01-27 RX ORDER — VANCOMYCIN HYDROCHLORIDE 250 MG/5ML
125 POWDER, FOR SOLUTION ORAL
Status: COMPLETED | OUTPATIENT
Start: 2019-01-27 | End: 2019-01-27

## 2019-01-27 RX ORDER — VANCOMYCIN HYDROCHLORIDE 250 MG/5ML
125 POWDER, FOR SOLUTION ORAL EVERY 6 HOURS
Qty: 100 ML | Refills: 0 | Status: SHIPPED | OUTPATIENT
Start: 2019-01-27 | End: 2019-02-06

## 2019-01-27 RX ORDER — METRONIDAZOLE 500 MG/1
500 TABLET ORAL 3 TIMES DAILY
Qty: 30 TAB | Refills: 0 | Status: SHIPPED | OUTPATIENT
Start: 2019-01-27 | End: 2019-02-06

## 2019-01-27 RX ORDER — METRONIDAZOLE 250 MG/1
500 TABLET ORAL
Status: COMPLETED | OUTPATIENT
Start: 2019-01-27 | End: 2019-01-27

## 2019-01-27 RX ADMIN — VANCOMYCIN HYDROCHLORIDE 125 MG: KIT at 15:16

## 2019-01-27 RX ADMIN — SODIUM CHLORIDE 1000 ML: 900 INJECTION, SOLUTION INTRAVENOUS at 13:59

## 2019-01-27 RX ADMIN — METRONIDAZOLE 500 MG: 250 TABLET ORAL at 14:43

## 2019-01-27 NOTE — ED PROVIDER NOTES
EMERGENCY DEPARTMENT HISTORY AND PHYSICAL EXAM 
 
 
Date: 1/27/2019 Patient Name: Justyna Thompson History of Presenting Illness Chief Complaint Patient presents with  Diarrhea  
  x 3 weeks, seen here 2 weeks ago for this issue and no improvement. History Provided By: Patient HPI: Justyna Thompson, 75 y.o. male with PMHx significant for HTN, DM, renal cancer, presents via wheelchair to the ED with cc of new onset watery malodorous diarrhea x 3days. Pt reports no associated sx. He expresses he was admitted on 1/2 for diarrhea noting he was diagnosed with C-diff and placed on 5 days of oral Vancomycin at time of discharge. Pt endorses finishing his abx regimen with resolution of the diarrhea. He discloses the diarrhea returned 3 days ago and has found no exacerbating or alleviating factors leading him back to the ED. He denies any fevers, chills, chest pain, SOB, abdominal pain, nausea, vomiting, or dysuria. There are no other complaints, changes, or physical findings at this time. PCP: Jenaro Morris MD 
SHx: (-)Tobacco; (-) ETOH; (-) Illicit drug use No current facility-administered medications on file prior to encounter. Current Outpatient Medications on File Prior to Encounter Medication Sig Dispense Refill  glipiZIDE (GLUCOTROL) 10 mg tablet Take 10 mg by mouth two (2) times a day.  allopurinol (ZYLOPRIM) 300 mg tablet Take 2 Tabs by mouth daily. 60 Tab 6  
 finasteride (PROSCAR) 5 mg tablet Take 1 Tab by mouth daily. 30 Tab 0  predniSONE (DELTASONE) 10 mg tablet Take 20 mg by mouth daily (with breakfast). 2 tablet once a day for 2 days 1 tablet once a day for 2 days 5 Tab 0  
 OTHER PT eval and treat DX: Cervical pain 1 Act 0  
 VENTOLIN HFA 90 mcg/actuation inhaler Take 2 Puffs by inhalation every six (6) hours as needed for Shortness of Breath.  0  
 gabapentin (NEURONTIN) 300 mg capsule Take 300 mg by mouth three (3) times daily.  cyanocobalamin (VITAMIN B-12) 1,000 mcg tablet Take 1,000 mcg by mouth daily.  amLODIPine (NORVASC) 5 mg tablet TAKE 1 TABLET BY MOUTH EVERY MORNING  3  
 magnesium oxide 250 mg tablet TAKE 1 TABLET BY MOUTH AS NEEDED ONCE A DAY ORALLY 30 DAY(S)  2  
 aspirin 81 mg chewable tablet Take 1 Tab by mouth daily. 30 Tab 11  
 carvedilol (COREG) 25 mg tablet Take 1 Tab by mouth two (2) times daily (with meals). 30 Tab 11  
 potassium chloride SR (KLOR-CON 10) 10 mEq tablet Take 1 Tab by mouth daily. 30 Tab 11  
 furosemide (LASIX) 20 mg tablet Take 1 Tab by mouth daily. 30 Tab 11  
 cholecalciferol (VITAMIN D3) 1,000 unit cap Take 1,000 Units by mouth daily.  lisinopril (PRINIVIL, ZESTRIL) 20 mg tablet Take 20 mg by mouth daily.  glipiZIDE SR (GLUCOTROL XL) 5 mg CR tablet Take 5 mg by mouth two (2) times a day.  pantoprazole (PROTONIX) 40 mg tablet Take 40 mg by mouth Daily (before breakfast).  atorvastatin (LIPITOR) 10 mg tablet TAKE 1 TABLET BY MOUTH ONCE A DAY  2  
 tamsulosin (FLOMAX) 0.4 mg capsule Take 0.4 mg by mouth daily. Past History Past Medical History: 
Past Medical History:  
Diagnosis Date  Arthritis  Chronic back pain  Diabetes ([facility] [address]  Dyslipidemia  Gout  Hypertension  Left wrist fracture  Renal cancer ([facility] [address] Past Surgical History: 
Past Surgical History:  
Procedure Laterality Date  COLONOSCOPY N/A 6/5/2018 COLONOSCOPY performed by Vannessa Katz MD at [facility] ENDOSCOPY  COLONOSCOPY,DIAGNOSTIC  3/24/2016  COLONOSCOPY,DIAGNOSTIC  6/5/2018  COLONOSCOPY,TANA CONKLIN,SNARE  3/24/2016  HX ORTHOPAEDIC Back and right knee surgery  HX ORTHOPAEDIC    
 carpal tunnel bilateral  
 RENAL SCOPE,W/RESECTION,TUMOR  UPPER GI ENDOSCOPY,BIOPSY  3/23/2016  UPPER GI ENDOSCOPY,BIOPSY  6/5/2018 Family History: 
Family History Problem Relation Age of Onset  Breast Cancer Mother  Diabetes Mother  Prostate Cancer Father  Cancer Sister   
     lung  Cancer Brother Social History: 
Social History Tobacco Use  Smoking status: Former Smoker Packs/day: 1.00 Years: 45.00 Pack years: 45.00 Last attempt to quit: 3/12/2005 Years since quittin.8  Smokeless tobacco: Never Used Substance Use Topics  Alcohol use: No  
  Comment: occasional  
 Drug use: No  
 
 
Allergies: 
No Known Allergies Review of Systems Review of Systems Constitutional: Negative for chills and fever. HENT: Negative for congestion and sore throat. Eyes: Negative for visual disturbance. Respiratory: Negative for cough and shortness of breath. Cardiovascular: Negative for chest pain and leg swelling. Gastrointestinal: Positive for diarrhea. Negative for abdominal pain, blood in stool, nausea and vomiting. Endocrine: Negative for polyuria. Genitourinary: Negative for dysuria and testicular pain. Musculoskeletal: Negative for arthralgias, joint swelling and myalgias. Skin: Negative for rash. Allergic/Immunologic: Negative for immunocompromised state. Neurological: Negative for weakness and headaches. Hematological: Does not bruise/bleed easily. Psychiatric/Behavioral: Negative for confusion. Physical Exam  
Physical Exam  
Constitutional: He is oriented to person, place, and time. He appears well-developed and well-nourished. HENT:  
Head: Normocephalic and atraumatic. Moist mucous membranes Eyes: Conjunctivae are normal. Pupils are equal, round, and reactive to light. Right eye exhibits no discharge. Left eye exhibits no discharge. Neck: Normal range of motion. Neck supple. No tracheal deviation present. Cardiovascular: Normal rate, regular rhythm and normal heart sounds. No murmur heard.  
Pulmonary/Chest: Effort normal and breath sounds normal. No respiratory distress. He has no wheezes. He has no rales. Abdominal: Soft. Bowel sounds are normal. There is no tenderness. There is no rebound and no guarding. Musculoskeletal: Normal range of motion. He exhibits no edema, tenderness or deformity. Neurological: He is alert and oriented to person, place, and time. Skin: Skin is warm and dry. No rash noted. No erythema. Psychiatric: His behavior is normal.  
Nursing note and vitals reviewed. Diagnostic Study Results Labs - Recent Results (from the past 12 hour(s)) CBC WITH AUTOMATED DIFF Collection Time: 01/27/19 11:24 AM  
Result Value Ref Range WBC 13.9 (H) 4.1 - 11.1 K/uL  
 RBC 3.77 (L) 4.10 - 5.70 M/uL  
 HGB 11.5 (L) 12.1 - 17.0 g/dL HCT 37.0 36.6 - 50.3 % MCV 98.1 80.0 - 99.0 FL  
 MCH 30.5 26.0 - 34.0 PG  
 MCHC 31.1 30.0 - 36.5 g/dL  
 RDW 15.5 (H) 11.5 - 14.5 % PLATELET 208 206 - 718 K/uL MPV 12.3 8.9 - 12.9 FL  
 NRBC 0.0 0  WBC ABSOLUTE NRBC 0.00 0.00 - 0.01 K/uL NEUTROPHILS 88 (H) 32 - 75 % LYMPHOCYTES 6 (L) 12 - 49 % MONOCYTES 5 5 - 13 % EOSINOPHILS 0 0 - 7 % BASOPHILS 0 0 - 1 % IMMATURE GRANULOCYTES 1 (H) 0.0 - 0.5 % ABS. NEUTROPHILS 12.2 (H) 1.8 - 8.0 K/UL  
 ABS. LYMPHOCYTES 0.8 0.8 - 3.5 K/UL  
 ABS. MONOCYTES 0.8 0.0 - 1.0 K/UL  
 ABS. EOSINOPHILS 0.0 0.0 - 0.4 K/UL  
 ABS. BASOPHILS 0.0 0.0 - 0.1 K/UL  
 ABS. IMM. GRANS. 0.1 (H) 0.00 - 0.04 K/UL  
 DF AUTOMATED METABOLIC PANEL, COMPREHENSIVE Collection Time: 01/27/19 11:24 AM  
Result Value Ref Range Sodium 141 136 - 145 mmol/L Potassium 3.9 3.5 - 5.1 mmol/L Chloride 107 97 - 108 mmol/L  
 CO2 25 21 - 32 mmol/L Anion gap 9 5 - 15 mmol/L Glucose 132 (H) 65 - 100 mg/dL BUN 14 6 - 20 MG/DL Creatinine 1.34 (H) 0.70 - 1.30 MG/DL  
 BUN/Creatinine ratio 10 (L) 12 - 20 GFR est AA >60 >60 ml/min/1.73m2 GFR est non-AA 52 (L) >60 ml/min/1.73m2  Calcium 8.6 8.5 - 10.1 MG/DL  
 Bilirubin, total 0.7 0.2 - 1.0 MG/DL  
 ALT (SGPT) 15 12 - 78 U/L  
 AST (SGOT) 10 (L) 15 - 37 U/L Alk. phosphatase 82 45 - 117 U/L Protein, total 7.1 6.4 - 8.2 g/dL Albumin 3.1 (L) 3.5 - 5.0 g/dL Globulin 4.0 2.0 - 4.0 g/dL A-G Ratio 0.8 (L) 1.1 - 2.2 GLUCOSE, POC Collection Time: 01/27/19  2:26 PM  
Result Value Ref Range Glucose (POC) 113 (H) 65 - 100 mg/dL Performed by Rose Marie Lovett Medical Decision Making I am the first provider for this patient. I reviewed the vital signs, available nursing notes, past medical history, past surgical history, family history and social history. Vital Signs-Reviewed the patient's vital signs. Patient Vitals for the past 12 hrs: 
 Temp Pulse Resp BP SpO2  
01/27/19 1057 98.6 °F (37 °C) 99 12 126/59 94 % Pulse Oximetry Analysis - 94% on room air Cardiac Monitor:  
Rate: 99 bpm 
Rhythm: Normal Sinus Rhythm Records Reviewed: Nursing Notes, Old Medical Records, Previous Radiology Studies and Previous Laboratory Studies Provider Notes (Medical Decision Making): Recurrence of C-diff. Will restart PO vancomycin per guidelines. Pt does not have an acute abdominal exam. No concern for toxic megacolon ED Course:  
Initial assessment performed. The patients presenting problems have been discussed, and they are in agreement with the care plan formulated and outlined with them. I have encouraged them to ask questions as they arise throughout their visit. Progress Notes: 
 
2:30 PM 
The pt has been re-evaluated. Griselda Borrero DO spoke with Adi Flores from pharmacy for pt's second reoccurrence of C-diff. She recommends placing the pt on 125mg Vancomycin BID x 10 days. ED Course as of Jan 27 1552 Saulo Agent Jan 27, 2019  
1551 3:51 PM  
The pt has been re-evaluated. Pt was updated on plan for discharge with PO Vancomycin for his sx. Pt was given return precautions. [EB] ED Course User Index 
[EB] Malika Collins Critical Care Time:  
0 minutes Disposition: 
Discharge Note: 
3:43 PM 
The patient is ready for discharge. The patient's signs, symptoms, diagnosis, and discharge instruction have been discussed and the patient has conveyed their understanding. The patient is to follow up as recommended or return to the ER should their symptoms worsen. Plan has been discussed and the patient is in agreement. Written by Hunter Collins ED Scribe, as dictated by Sreekanth Acuña DO 
 
PLAN: 
1. Current Discharge Medication List  
  
START taking these medications Details  
vancomycin (FIRVANQ) 50 mg/mL oral solution Take 2.5 mL by mouth every six (6) hours for 10 days. Qty: 100 mL, Refills: 0  
  
metroNIDAZOLE (FLAGYL) 500 mg tablet Take 1 Tab by mouth three (3) times daily for 10 days. Qty: 30 Tab, Refills: 0  
  
  
 
2. Follow-up Information Follow up With Specialties Details Why Contact Info Cherelle Kaur MD Family Practice Schedule an appointment as soon as possible for a visit  [address] [place] [address] [phone] [facility] EMERGENCY DEPT Emergency Medicine  If symptoms worsen [address] 
[phone] Return to ED if worse Diagnosis Clinical Impression:  
1. C. difficile diarrhea Attestations: 
 
Attestation: This note is prepared by Jose E Collins, acting as Scribe for Sreekanth Acuña DO. Sreekanth Acuña DO: The scribe's documentation has been prepared under my direction and personally reviewed by me in its entirety. I confirm that the note above accurately reflects all work, treatment, procedures, and medical decision making performed by me.

## 2019-01-27 NOTE — ED NOTES
Discharge paperwork given to pt by MD. No acute changes noted pt. Pt stable and ambulatory upon discharge.

## 2019-01-27 NOTE — ED NOTES
Pt c/o diarrhea x2 weeks and reporting 5 episodes this morning. Denies abdominal pain, n/v. Pt is A&O. Informed pt in need for stool sample.

## 2019-01-27 NOTE — DISCHARGE INSTRUCTIONS
Patient Education        Clostridium Difficile Colitis: Care Instructions  Your Care Instructions    Clostridium difficile (also called C. difficile) are bacteria that can cause swelling and irritation of the large intestine, or colon. This inflammation is also called colitis. It can cause diarrhea, fever, and belly cramps. You may get C. difficile colitis if you take antibiotics. The infection is most common in people who are taking antibiotics while in the hospital. It is also common in older people in hospitals and nursing homes. Severe disease could cause the colon to swell to many times its normal size (toxic megacolon). This can cause death and needs emergency treatment. You may have a swollen belly that is painful or tender, a rapid heartbeat, and a fever. Follow-up care is a key part of your treatment and safety. Be sure to make and go to all appointments, and call your doctor if you are having problems. It's also a good idea to know your test results and keep a list of the medicines you take. How can you care for yourself at home? · Your doctor may give you antibiotics to treat C. difficile colitis. If your doctor prescribes an antibiotic, he or she will give you a different antibiotic than the one that caused your infection. Take your antibiotics as directed. Do not stop taking them just because you feel better. You need to take the full course of antibiotics. · To prevent dehydration, drink plenty of fluids, enough so that your urine is light yellow or clear like water. Choose water and other caffeine-free clear liquids until you feel better. If you have kidney, heart, or liver disease and have to limit fluids, talk with your doctor before you increase the amount of fluids you drink. · Begin eating small amounts of mild foods, if you feel like it. Try yogurt that has live cultures of lactobacillus (check the label). ?  Avoid spicy foods, fruits, alcohol, and caffeine until 48 hours after all symptoms go away. ? Avoid chewing gum that contains sorbitol. ? Avoid dairy products (except for yogurt with lactobacillus) while you have diarrhea and for 3 days after symptoms go away. · To prevent the spread of C. difficile, practice good hygiene. Keep your hands clean by washing them well and often with soap and clean, running water. Alcohol-based hand sanitizers do not kill C. difficile. When should you call for help? Call 911 if:    · You passed out (lost consciousness).    Call your doctor now or seek immediate medical care if:    · You have a fever over 101°F or shaking chills.     · You feel lightheaded or have a fast heart rate.     · You pass stools that are almost always bloody.     · You have signs of needing more fluids. You have sunken eyes and a dry mouth, and you pass only a little dark urine.     · You have severe belly pain with or without bloating.     · You have severe vomiting and cannot keep down liquids.     · You are not passing any stools or gas.    Watch closely for changes in your health, and be sure to contact your doctor if:    · You do not get better as expected. Where can you learn more? Go to http://nimisha-joshua.info/. Enter ([phone] [id number] in the search box to learn more about \"Clostridium Difficile Colitis: Care Instructions. \"  Current as of: July 30, 2018  Content Version: 11.9  © 3707-1864 [employer]. Care instructions adapted under license by [employer] (which disclaims liability or warranty for this information). If you have questions about a medical condition or this instruction, always ask your healthcare professional. [place] [address] any warranty or liability for your use of this information.

## 2019-02-15 ENCOUNTER — HOSPITAL ENCOUNTER (EMERGENCY)
Age: 74
Discharge: HOME | End: 2019-02-15
Attending: EMERGENCY MEDICINE
Payer: MEDICARE

## 2019-02-15 VITALS
HEIGHT: 72 IN | BODY MASS INDEX: 26.28 KG/M2 | OXYGEN SATURATION: 99 % | TEMPERATURE: 97.7 F | RESPIRATION RATE: 16 BRPM | DIASTOLIC BLOOD PRESSURE: 68 MMHG | HEART RATE: 93 BPM | WEIGHT: 194 LBS | SYSTOLIC BLOOD PRESSURE: 114 MMHG

## 2019-02-15 DIAGNOSIS — A04.72 C. DIFFICILE DIARRHEA: Primary | ICD-10-CM

## 2019-02-15 PROCEDURE — 99282 EMERGENCY DEPT VISIT SF MDM: CPT

## 2019-02-15 NOTE — DISCHARGE INSTRUCTIONS
Patient Education        Clostridium Difficile Colitis: Care Instructions  Your Care Instructions    Clostridium difficile (also called C. difficile) are bacteria that can cause swelling and irritation of the large intestine, or colon. This inflammation is also called colitis. It can cause diarrhea, fever, and belly cramps. You may get C. difficile colitis if you take antibiotics. The infection is most common in people who are taking antibiotics while in the hospital. It is also common in older people in hospitals and nursing homes. Severe disease could cause the colon to swell to many times its normal size (toxic megacolon). This can cause death and needs emergency treatment. You may have a swollen belly that is painful or tender, a rapid heartbeat, and a fever. Follow-up care is a key part of your treatment and safety. Be sure to make and go to all appointments, and call your doctor if you are having problems. It's also a good idea to know your test results and keep a list of the medicines you take. How can you care for yourself at home? · Your doctor may give you antibiotics to treat C. difficile colitis. If your doctor prescribes an antibiotic, he or she will give you a different antibiotic than the one that caused your infection. Take your antibiotics as directed. Do not stop taking them just because you feel better. You need to take the full course of antibiotics. · To prevent dehydration, drink plenty of fluids, enough so that your urine is light yellow or clear like water. Choose water and other caffeine-free clear liquids until you feel better. If you have kidney, heart, or liver disease and have to limit fluids, talk with your doctor before you increase the amount of fluids you drink. · Begin eating small amounts of mild foods, if you feel like it. Try yogurt that has live cultures of lactobacillus (check the label). ?  Avoid spicy foods, fruits, alcohol, and caffeine until 48 hours after all symptoms go away. ? Avoid chewing gum that contains sorbitol. ? Avoid dairy products (except for yogurt with lactobacillus) while you have diarrhea and for 3 days after symptoms go away. · To prevent the spread of C. difficile, practice good hygiene. Keep your hands clean by washing them well and often with soap and clean, running water. Alcohol-based hand sanitizers do not kill C. difficile. When should you call for help? Call 911 if:    · You passed out (lost consciousness).    Call your doctor now or seek immediate medical care if:    · You have a fever over 101°F or shaking chills.     · You feel lightheaded or have a fast heart rate.     · You pass stools that are almost always bloody.     · You have signs of needing more fluids. You have sunken eyes and a dry mouth, and you pass only a little dark urine.     · You have severe belly pain with or without bloating.     · You have severe vomiting and cannot keep down liquids.     · You are not passing any stools or gas.    Watch closely for changes in your health, and be sure to contact your doctor if:    · You do not get better as expected. Where can you learn more? Go to http://nimisha-joshua.info/. Enter (20) 6624-0622 in the search box to learn more about \"Clostridium Difficile Colitis: Care Instructions. \"  Current as of: July 30, 2018  Content Version: 11.9  © 4663-4350 [employer]. Care instructions adapted under license by [employer] (which disclaims liability or warranty for this information). If you have questions about a medical condition or this instruction, always ask your healthcare professional. [place] [address] any warranty or liability for your use of this information.

## 2019-02-15 NOTE — ED PROVIDER NOTES
EMERGENCY DEPARTMENT HISTORY AND PHYSICAL EXAM 
 
 
Date: 2/15/2019 Patient Name: Itzel Rod History of Presenting Illness Chief Complaint Patient presents with  Diarrhea \"that diarrhea back again\" a week PROVIDER IN TRIAGE NOTE: 
1:49 PM 
Laura Dickey PA-C has evaluated the patient as the Provider in Triage (PIT) / provider in [facility] for diarrhea and history of C. dif. The vital signs and the triage nurse assessment have been reviewed. The patient and any available family have been advised that the appropriate studies have been ordered to initiate the work up based on the clinical presentation during the assessment. The pt has been advised that they will be accommodated in Emergency Department as soon as possible. The pt has been requested to contact the triage nurse or PIT/JET immediately if they experiences any changes in their condition during this brief waiting period. History Provided By: Patient and Patient's Wife HPI: Itzel Rod, 75 y.o. male with PMHx significant for HTN, arthritis, DM, dyslipidemia, gout, and renal cancer, presents ambulatory to the ED with cc of new onset watery diarrhea x 4 days. Pt states that he recently visited the ED  Approximately 2 weeks ago for treatment of diarrhea. Pts wife states that he was diagnosed with C.dif. at that time. He states that he was discharged with metronidazole and vancomycin and notes that his diarrhea alleviated while he was taking the medication. Pt states that he has since exhausted his supply, causing his diarrhea to return. He notes that the diarrhea is less than it was previously, but states that it occurs with every BM. Pt denies taking any other medication. He denies any modifying factors. Pt denies any associated sxs. He specifically denies any fevers, chills, nausea, vomiting, chest pain, shortness of breath, headache, rash, sweating or weight loss. There are no other complaints, changes, or physical findings at this time. PCP: Jenaro Morris MD 
 
No current facility-administered medications on file prior to encounter. Current Outpatient Medications on File Prior to Encounter Medication Sig Dispense Refill  glipiZIDE (GLUCOTROL) 10 mg tablet Take 10 mg by mouth two (2) times a day.  allopurinol (ZYLOPRIM) 300 mg tablet Take 2 Tabs by mouth daily. 60 Tab 6  
 finasteride (PROSCAR) 5 mg tablet Take 1 Tab by mouth daily. 30 Tab 0  predniSONE (DELTASONE) 10 mg tablet Take 20 mg by mouth daily (with breakfast). 2 tablet once a day for 2 days 1 tablet once a day for 2 days 5 Tab 0  
 OTHER PT eval and treat DX: Cervical pain 1 Act 0  
 VENTOLIN HFA 90 mcg/actuation inhaler Take 2 Puffs by inhalation every six (6) hours as needed for Shortness of Breath.  0  
 gabapentin (NEURONTIN) 300 mg capsule Take 300 mg by mouth three (3) times daily.  cyanocobalamin (VITAMIN B-12) 1,000 mcg tablet Take 1,000 mcg by mouth daily.  amLODIPine (NORVASC) 5 mg tablet TAKE 1 TABLET BY MOUTH EVERY MORNING  3  
 magnesium oxide 250 mg tablet TAKE 1 TABLET BY MOUTH AS NEEDED ONCE A DAY ORALLY 30 DAY(S)  2  
 aspirin 81 mg chewable tablet Take 1 Tab by mouth daily. 30 Tab 11  
 carvedilol (COREG) 25 mg tablet Take 1 Tab by mouth two (2) times daily (with meals). 30 Tab 11  
 potassium chloride SR (KLOR-CON 10) 10 mEq tablet Take 1 Tab by mouth daily. 30 Tab 11  
 furosemide (LASIX) 20 mg tablet Take 1 Tab by mouth daily. 30 Tab 11  
 cholecalciferol (VITAMIN D3) 1,000 unit cap Take 1,000 Units by mouth daily.  lisinopril (PRINIVIL, ZESTRIL) 20 mg tablet Take 20 mg by mouth daily.  glipiZIDE SR (GLUCOTROL XL) 5 mg CR tablet Take 5 mg by mouth two (2) times a day.  pantoprazole (PROTONIX) 40 mg tablet Take 40 mg by mouth Daily (before breakfast).     
 atorvastatin (LIPITOR) 10 mg tablet TAKE 1 TABLET BY MOUTH ONCE A DAY  2  
  tamsulosin (FLOMAX) 0.4 mg capsule Take 0.4 mg by mouth daily. Past History Past Medical History: 
Past Medical History:  
Diagnosis Date  Arthritis  Chronic back pain  Diabetes ([facility] [address]  Dyslipidemia  Gout  Hypertension  Left wrist fracture  Renal cancer ([facility] [address] Past Surgical History: 
Past Surgical History:  
Procedure Laterality Date  COLONOSCOPY N/A 2018 COLONOSCOPY performed by Murray Goodell., MD at [facility] ENDOSCOPY  COLONOSCOPY,DIAGNOSTIC  3/24/2016  COLONOSCOPY,DIAGNOSTIC  2018  COLONOSCOPY,REMV LESN,SNARE  3/24/2016  HX ORTHOPAEDIC Back and right knee surgery  HX ORTHOPAEDIC    
 carpal tunnel bilateral  
 RENAL SCOPE,W/RESECTION,TUMOR  UPPER GI ENDOSCOPY,BIOPSY  3/23/2016  UPPER GI ENDOSCOPY,BIOPSY  2018 Family History: 
Family History Problem Relation Age of Onset  Breast Cancer Mother  Diabetes Mother  Prostate Cancer Father  Cancer Sister   
     lung  Cancer Brother Social History: 
Social History Tobacco Use  Smoking status: Former Smoker Packs/day: 1.00 Years: 45.00 Pack years: 45.00 Last attempt to quit: 3/12/2005 Years since quittin.9  Smokeless tobacco: Never Used Substance Use Topics  Alcohol use: No  
  Comment: occasional  
 Drug use: No  
 
 
Allergies: 
No Known Allergies Review of Systems Review of Systems Constitutional: Negative. Negative for activity change, appetite change, chills, fatigue, fever and unexpected weight change. HENT: Negative. Negative for congestion, hearing loss, rhinorrhea, sneezing and voice change. Eyes: Negative. Negative for pain and visual disturbance. Respiratory: Negative. Negative for apnea, cough, choking, chest tightness and shortness of breath. Cardiovascular: Negative. Negative for chest pain and palpitations. Gastrointestinal: Positive for diarrhea. Negative for abdominal distention, abdominal pain, blood in stool, nausea and vomiting. Genitourinary: Negative. Negative for difficulty urinating, flank pain, frequency and urgency. No discharge Musculoskeletal: Negative. Negative for arthralgias, back pain, myalgias and neck stiffness. Skin: Negative. Negative for color change and rash. Neurological: Negative. Negative for dizziness, seizures, syncope, speech difficulty, weakness, numbness and headaches. Hematological: Negative for adenopathy. Psychiatric/Behavioral: Negative. Negative for agitation, behavioral problems, dysphoric mood and suicidal ideas. The patient is not nervous/anxious. Physical Exam  
Physical Exam  
Constitutional: He is oriented to person, place, and time. He appears well-developed and well-nourished. No distress. HENT:  
Head: Normocephalic and atraumatic. Mouth/Throat: Oropharynx is clear and moist. No oropharyngeal exudate. Eyes: Conjunctivae and EOM are normal. Pupils are equal, round, and reactive to light. Right eye exhibits no discharge. Left eye exhibits no discharge. Neck: Normal range of motion. Neck supple. Cardiovascular: Normal rate, regular rhythm and intact distal pulses. Exam reveals no gallop and no friction rub. No murmur heard. Pulmonary/Chest: Effort normal and breath sounds normal. No respiratory distress. He has no wheezes. He has no rales. He exhibits no tenderness. Abdominal: Soft. Bowel sounds are normal. He exhibits no distension and no mass. There is no tenderness. There is no rebound and no guarding. Musculoskeletal: Normal range of motion. He exhibits no edema. Lymphadenopathy:  
  He has no cervical adenopathy. Neurological: He is alert and oriented to person, place, and time. No cranial nerve deficit. Coordination normal.  
Skin: Skin is warm and dry. No rash noted. No erythema. Psychiatric: He has a normal mood and affect. Nursing note and vitals reviewed. Diagnostic Study Results Labs - No results found for this or any previous visit (from the past 12 hour(s)). Medical Decision Making I am the first provider for this patient. I reviewed the vital signs, available nursing notes, past medical history, past surgical history, family history and social history. Vital Signs-Reviewed the patient's vital signs. Patient Vitals for the past 12 hrs: 
 Temp Pulse Resp BP SpO2  
02/15/19 1315 97.7 °F (36.5 °C) 93 16 114/68 99 % Pulse Oximetry Analysis - 99% on room air Cardiac Monitor:  
Rate: 93 bpm 
Rhythm: Normal Sinus Rhythm Records Reviewed: Nursing Notes, Old Medical Records, Previous Radiology Studies and Previous Laboratory Studies Provider Notes (Medical Decision Making): DDx: Medication side-effect or c-diff. No suspicion for toxic francine colon, abd non tender, non febrile, not dehydrated appearing. ED Course:  
Initial assessment performed. The patients presenting problems have been discussed, and they are in agreement with the care plan formulated and outlined with them. I have encouraged them to ask questions as they arise throughout their visit. ED Course as of Feb 16 1218 Fri Feb 15, 2019  
1617 The patients pharmacy has called and the patient cannot afford the medication. We will change to vanc and have the patient follow up with GI.  [CS] ED Course User Index 
[CS] Claudene Plummer, MD  
 
CONSULT NOTE:  
2:12 PM 
Claudene Plummer, MD spoke with Nora Santiago, PharmD, Specialty: Pharmacy Discussed pt's hx, disposition, and available diagnostic and imaging results. Reviewed care plans. Consultant agrees with plans as outlined. Recommends prescribing 200 mg bid fidaxomicin x 10 days. Written by Loan Mccormick ED Scribe, as dictated by Claudene Plummer, MD. Critical Care Time:  
0 minutes. Disposition: DISCHARGE NOTE: 
2:18 PM 
The patient is ready for discharge. The patients signs, symptoms, diagnosis, and instructions for discharge have been discussed and the pt has conveyed their understanding. The patient is to follow up as recommended with PCP or return to the ER should their symptoms worsen. Plan has been discussed and patient has conveyed their agreement. PLAN: 
1. Discharge home. Current Discharge Medication List  
  
START taking these medications Details  
fidaxomicin (DIFICID) 200 mg tab tablet Take 1 Tab by mouth two (2) times a day for 10 days. Qty: 20 Tab, Refills: 0  
  
  
 
2. Follow-up Information Follow up With Specialties Details Why Contact Info Jake Downing MD Family [address] [address] [phone] Return to ED if worse Diagnosis Clinical Impression:  
1. C. difficile diarrhea Attestations: This note is prepared by Jamison Dover, acting as Scribe for Leticia Neal MD. 
 
Leticia Neal MD: The scribe's documentation has been prepared under my direction and personally reviewed by me in its entirety. I confirm that the note above accurately reflects all work, treatment, procedures, and medical decision making performed by me. This note will not be viewable in [address]

## 2019-07-03 ENCOUNTER — HOSPITAL ENCOUNTER (OUTPATIENT)
Dept: GENERAL RADIOLOGY | Age: 74
Discharge: HOME | End: 2019-07-03
Payer: MEDICARE

## 2019-07-03 DIAGNOSIS — I27.0 PRIMARY PULMONARY HYPERTENSION (HCC): ICD-10-CM

## 2019-07-03 PROCEDURE — 71046 X-RAY EXAM CHEST 2 VIEWS: CPT

## 2019-07-23 ENCOUNTER — HOSPITAL ENCOUNTER (OUTPATIENT)
Dept: LAB | Age: 74
Discharge: HOME | End: 2019-07-23
Payer: MEDICARE

## 2019-07-23 ENCOUNTER — OFFICE VISIT (OUTPATIENT)
Dept: RHEUMATOLOGY | Age: 74
End: 2019-07-23

## 2019-07-23 VITALS
WEIGHT: 207.4 LBS | SYSTOLIC BLOOD PRESSURE: 150 MMHG | RESPIRATION RATE: 16 BRPM | OXYGEN SATURATION: 92 % | TEMPERATURE: 97.6 F | BODY MASS INDEX: 28.13 KG/M2 | DIASTOLIC BLOOD PRESSURE: 68 MMHG | HEART RATE: 76 BPM

## 2019-07-23 DIAGNOSIS — M10.9 GOUT, ARTHRITIS: Primary | ICD-10-CM

## 2019-07-23 PROCEDURE — 84550 ASSAY OF BLOOD/URIC ACID: CPT

## 2019-07-23 PROCEDURE — 36415 COLL VENOUS BLD VENIPUNCTURE: CPT

## 2019-07-23 RX ORDER — SPIRONOLACTONE 25 MG/1
TABLET ORAL
Refills: 3 | COMMUNITY
Start: 2019-06-04

## 2019-07-23 NOTE — PROGRESS NOTES
Chief Complaint   Patient presents with    Joint Pain     1. Have you been to the ER, urgent care clinic since your last visit? Hospitalized since your last visit? No    2. Have you seen or consulted any other health care providers outside of the [address] [place] [address] since your last visit? Include any pap smears or colon screening.  No

## 2019-07-23 NOTE — PROGRESS NOTES
RHEUMATOLOGY PROBLEM LIST AND CHIEF COMPLAINT  1. Gout - crystal-proven (9/2016), diagnosed 3 years ago; most recent uric acid 2.7 (07/2018)  2. CPPD    INTERVAL HISTORY  This is a 75 y.o.  male. Today, the patient complains of generalized pain. Location: hand  Severity: 6 on a scale of 0-10  Timing: all day   Duration:  3 months  Context/Associated signs and symptoms: The patient complains of frequent episodes of joint pain for the last three months. During these episodes he has hand pain with 15-20 minutes of morning stiffness and intermittent ankle pain. He has taken Tylenol PRN for this complaint. He continues with Allpurinol down to 600 mg daily.  He had recent labs performed which he states were all normal.     RHEUMATOLOGY REVIEW OF SYSTEMS   Positives as per history  Negatives as follows:  CONSTITUTlONAL:  Denies unexplained persistent fevers, weight change, chronic fatigue  HEAD/EYES:   Denies eye redness, blurry vision or sudden loss of vision, dry eyes, HA, temporal artery pain  ENT:    Denies oral/nasal ulcers, recurrent sinus infections, dry mouth  RESPIRATORY:  No pleuritic pain, history of pleural effusions, hemoptysis, exertional dyspnea  CARDIOVASCULAR:  Denies chest pain, history of pericardial effusions  GASTRO:   Denies heartburn, esophageal dysmotility, abdominal pain, nausea, vomiting, diarrhea, blood in the stool  HEMATOLOGIC:  No easy bruising, purpura, swollen lymph nodes  SKIN:    Denies alopecia, ulcers, nodules, sun sensitivity, unexplained persistent rash   VASCULAR:   Denies edema, cyanosis, raynaud phenomenon  NEUROLOGIC:  Denies specific muscle weakness, paresthesias   PSYCHIATRIC:  No sleep disturbance / snoring, depression, anxiety  MSK:    No morning stiffness >1 hour, SI joint pain, persistent joint swelling, persistent joint pain    PAST MEDICAL HISTORY  Reviewed with patient, significant changes in medical history - yes, recent hospital visit for FEMI Diff    PHYSICAL EXAM  Blood pressure 150/68, pulse 76, temperature 97.6 °F (36.4 °C), temperature source Oral, resp. rate 16, weight 207 lb 6.4 oz (94.1 kg), SpO2 92 %. GENERAL APPEARANCE: Well-nourished adult in no acute distress. Walking with a cane. EYES: No scleral erythema, conjunctival injection. ENT: No oral ulcer, parotid enlargement. NECK: No adenopathy, thyroid enlargement. CARDIOVASCULAR: Heart rhythm is regular. No murmur, rub, gallop. CHEST: Normal vesicular breath sounds. No wheezes, rales, pleural friction rubs. ABDOMINAL: The abdomen is soft and nontender. Liver and spleen are nonpalpable. Bowel sounds are normal.  EXTREMITIES: There is no evidence of clubbing, cyanosis, edema. SKIN: No rash, palpable purpura, digital ulcer, abnormal thickening. NEUROLOGICAL: Normal gait and station, full strength in upper and lower extremities, normal sensation to light touch. MUSCULOSKELETAL:   Upper extremities - OA changes noted. B/L Hands mild dROM, no synovitis. dROM b/l 3rd digits,   Lower extremities - full range of motion, no tenderness, no swelling, no synovial thickening and no deformity of joints     LABS, RADIOLOGY AND PROCEDURES   Previous labs reviewed -Yes    Uric Acid  12/2017 - 5.2  1/2018 - 4.6  4/2018 - 3.0    ASSESSMENT  1. Gout (Established problem -  Complete Response) - I do not suspect his current joint complaints are due to gout. I will check his uric acid to ensure it is not chronic gout, even though his exam is not consistent with this. For now. he should continue on Allopurinol 600 mg daily. He should return in 6 months. 2. Osteoarthritis - I suspect the majority of his pain today is do to this. We discussed that NSAIDs were contraindicated based on his kidney function and Prednisone will elevate his sugars. I recommend  tylenol 500 mg BID to improve his joint complaints. PLAN  1. Allopurinol 600 mg daily  2. Tylenol 500 mg BID  3. Check uric acid  4.  Return in 6 larry Lemus MD  Adult and Pediatric Rheumatology     [address], Phone [phone], Fax [phone]   E-mail: [email or web address]    Visiting  of Pediatrics    Department of Pediatrics, [facility] [address], Phone [phone], Fax [phone]  E-mail: [email or web address]    There are no Patient Instructions on file for this visit. cc:  MD Caro Cheng    Written by sky Samson, as dictated by [facility].  Gregory Lemus M.D.

## 2019-07-24 LAB — URATE SERPL-MCNC: 2.9 MG/DL (ref 3.7–8.6)

## 2019-11-12 RX ORDER — ALLOPURINOL 300 MG/1
TABLET ORAL
Qty: 180 TAB | Refills: 0 | Status: SHIPPED | OUTPATIENT
Start: 2019-11-12 | End: 2020-01-21 | Stop reason: SDUPTHER

## 2020-01-21 ENCOUNTER — OFFICE VISIT (OUTPATIENT)
Dept: RHEUMATOLOGY | Age: 75
End: 2020-01-21

## 2020-01-21 VITALS
WEIGHT: 211.8 LBS | RESPIRATION RATE: 16 BRPM | OXYGEN SATURATION: 92 % | HEART RATE: 81 BPM | BODY MASS INDEX: 28.73 KG/M2 | SYSTOLIC BLOOD PRESSURE: 121 MMHG | TEMPERATURE: 97.3 F | DIASTOLIC BLOOD PRESSURE: 64 MMHG

## 2020-01-21 DIAGNOSIS — M10.041 ACUTE IDIOPATHIC GOUT OF RIGHT HAND: ICD-10-CM

## 2020-01-21 DIAGNOSIS — M10.9 GOUT, ARTHRITIS: Primary | ICD-10-CM

## 2020-01-21 RX ORDER — ALLOPURINOL 300 MG/1
TABLET ORAL
Qty: 180 TAB | Refills: 2 | Status: SHIPPED | OUTPATIENT
Start: 2020-01-21 | End: 2020-11-11

## 2020-01-21 RX ORDER — BUDESONIDE AND FORMOTEROL FUMARATE DIHYDRATE 160; 4.5 UG/1; UG/1
AEROSOL RESPIRATORY (INHALATION)
COMMUNITY
Start: 2019-10-29

## 2020-01-21 NOTE — PROGRESS NOTES
Chief Complaint   Patient presents with    Joint Pain     1. Have you been to the ER, urgent care clinic since your last visit? Hospitalized since your last visit? No    2. Have you seen or consulted any other health care providers outside of the [address] [place] [address] since your last visit? Include any pap smears or colon screening. No

## 2020-01-21 NOTE — PROGRESS NOTES
RHEUMATOLOGY PROBLEM LIST AND CHIEF COMPLAINT  1. 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 CPPD    INTERVAL HISTORY  This is a 75 y.o.  male. Today, the patient complains of generalized pain. Location: none  Severity: 0 on a scale of 0-10  Timing: none  Duration:  6 months  Context/Associated signs and symptoms: The patient is doing well today. He denies any further episodes of gout since he was last seen. His last uric acid was 2.9. He continues with Allopurinol 600 mg daily. His chief complaint today is intermittent pain in the hands. He is using copper gloves for his joint pain with good relief. He denies new medications or medical issues. RHEUMATOLOGY REVIEW OF SYSTEMS   Positives as per history  Negatives as follows:  Anjelica Millfield:  Denies unexplained persistent fevers, weight change, chronic fatigue  HEAD/EYES:   Denies eye redness, blurry vision or sudden loss of vision, dry eyes, HA, temporal artery pain  ENT:    Denies oral/nasal ulcers, recurrent sinus infections, dry mouth  RESPIRATORY:  No pleuritic pain, history of pleural effusions, hemoptysis, exertional dyspnea  CARDIOVASCULAR:  Denies chest pain, history of pericardial effusions  GASTRO:   Denies heartburn, esophageal dysmotility, abdominal pain, nausea, vomiting, diarrhea, blood in the stool  HEMATOLOGIC:  No easy bruising, purpura, swollen lymph nodes  SKIN:    Denies alopecia, ulcers, nodules, sun sensitivity, unexplained persistent rash   VASCULAR:   Denies edema, cyanosis, raynaud phenomenon  NEUROLOGIC:  Denies specific muscle weakness, paresthesias   PSYCHIATRIC:  No sleep disturbance / snoring, depression, anxiety  MSK:    No morning stiffness >1 hour, SI joint pain, persistent joint swelling, persistent joint pain    PAST MEDICAL HISTORY  Reviewed with patient, significant changes in medical history - yes, recent hospital visit for C.  Diff    PHYSICAL EXAM  Blood pressure 121/64, pulse 81, temperature 97.3 °F (36.3 °C), temperature source Oral, resp. rate 16, weight 211 lb 12.8 oz (96.1 kg), SpO2 92 %. GENERAL APPEARANCE: Well-nourished adult in no acute distress. Walking with a cane. EYES: No scleral erythema, conjunctival injection. ENT: No oral ulcer, parotid enlargement. NECK: No adenopathy, thyroid enlargement. CARDIOVASCULAR: Heart rhythm is regular. No murmur, rub, gallop. CHEST: Normal vesicular breath sounds. No wheezes, rales, pleural friction rubs. ABDOMINAL: The abdomen is soft and nontender. Liver and spleen are nonpalpable. Bowel sounds are normal.  EXTREMITIES: There is no evidence of clubbing, cyanosis, edema. SKIN: No rash, palpable purpura, digital ulcer, abnormal thickening. NEUROLOGICAL: Normal gait and station, full strength in upper and lower extremities, normal sensation to light touch. MUSCULOSKELETAL:   Upper extremities - OA changes noted. B/L Hands mild dROM, no synovitis. dROM b/l 3rd digits,   Lower extremities - full range of motion, no tenderness, no swelling, no synovial thickening and no deformity of joints     LABS, RADIOLOGY AND PROCEDURES   Previous labs reviewed -Yes    Uric Acid  12/2017 - 5.2  1/2018 - 4.6  4/2018 - 3.0    ASSESSMENT  1. Gout (Established problem -  Complete Response) - The patient's gout is currently stable. His last uric acid (7/2019) was 2.9. I recommend he continue on Allopurinol 600 mg daily. We will continue to monitor his kidney function, if this worsens we will consider decreasing the dose of Allopurinol. He should return in 1 year. 2. Osteoarthritis -The majority of his hand pain today is due to this. He can continue with tylenol PRN and copper gloves which have provided some relief. PLAN  1. Allopurinol 600 mg daily  2. Tylenol 500 mg PRN  3. Return in 1 year    Linh Mendez MD  Adult and Pediatric Rheumatology     [address] [place] [address], Phone [phone], Fax [phone]   E-mail: [email or web address]    Visiting  of Pediatrics    Department of Pediatrics, [facility], [address] [place] [address], Phone [phone], Fax [phone]  E-mail: [email or web address]    There are no Patient Instructions on file for this visit. cc:  MD Michelle Albright    Written by sky Garcia, as dictated by Sally Leigh.  King Woods M.D.

## 2020-08-10 ENCOUNTER — OFFICE VISIT (OUTPATIENT)
Dept: URGENT CARE | Age: 75
End: 2020-08-10
Payer: MEDICARE

## 2020-08-10 VITALS — HEART RATE: 68 BPM | RESPIRATION RATE: 18 BRPM | TEMPERATURE: 98.6 F | OXYGEN SATURATION: 97 %

## 2020-08-10 DIAGNOSIS — U07.1 COVID-19: ICD-10-CM

## 2020-08-10 DIAGNOSIS — Z20.822 ENCOUNTER FOR LABORATORY TESTING FOR COVID-19 VIRUS: Primary | ICD-10-CM

## 2020-08-10 PROCEDURE — 99211 OFF/OP EST MAY X REQ PHY/QHP: CPT | Performed by: FAMILY MEDICINE

## 2020-08-12 LAB — SARS-COV-2, NAA: NOT DETECTED

## 2020-09-08 ENCOUNTER — OFFICE VISIT (OUTPATIENT)
Dept: URGENT CARE | Age: 75
End: 2020-09-08
Payer: MEDICARE

## 2020-09-08 VITALS — TEMPERATURE: 98.4 F | OXYGEN SATURATION: 95 % | RESPIRATION RATE: 16 BRPM | HEART RATE: 84 BPM

## 2020-09-08 DIAGNOSIS — Z20.822 ENCOUNTER FOR LABORATORY TESTING FOR COVID-19 VIRUS: Primary | ICD-10-CM

## 2020-09-08 PROCEDURE — 99211 OFF/OP EST MAY X REQ PHY/QHP: CPT | Performed by: FAMILY MEDICINE

## 2020-09-10 LAB — SARS-COV-2, NAA: NOT DETECTED

## 2020-09-11 ENCOUNTER — HOSPITAL ENCOUNTER (OUTPATIENT)
Dept: GENERAL RADIOLOGY | Age: 75
Discharge: HOME | End: 2020-09-11
Attending: INTERNAL MEDICINE
Payer: MEDICARE

## 2020-09-11 ENCOUNTER — HOSPITAL ENCOUNTER (OUTPATIENT)
Dept: NUCLEAR MEDICINE | Age: 75
Discharge: HOME | End: 2020-09-11
Attending: INTERNAL MEDICINE
Payer: MEDICARE

## 2020-09-11 DIAGNOSIS — I27.20 PULMONARY HTN (HCC): ICD-10-CM

## 2020-09-11 PROCEDURE — 71046 X-RAY EXAM CHEST 2 VIEWS: CPT

## 2020-09-11 PROCEDURE — 78582 LUNG VENTILAT&PERFUS IMAGING: CPT

## 2020-10-21 ENCOUNTER — OFFICE VISIT (OUTPATIENT)
Dept: RHEUMATOLOGY | Age: 75
End: 2020-10-21
Payer: MEDICARE

## 2020-10-21 VITALS
OXYGEN SATURATION: 92 % | DIASTOLIC BLOOD PRESSURE: 72 MMHG | SYSTOLIC BLOOD PRESSURE: 138 MMHG | WEIGHT: 212 LBS | HEART RATE: 71 BPM | TEMPERATURE: 97.6 F | RESPIRATION RATE: 16 BRPM | BODY MASS INDEX: 28.75 KG/M2

## 2020-10-21 DIAGNOSIS — M10.9 GOUTY ARTHRITIS: ICD-10-CM

## 2020-10-21 DIAGNOSIS — M10.041 ACUTE IDIOPATHIC GOUT OF RIGHT HAND: ICD-10-CM

## 2020-10-21 DIAGNOSIS — M10.9 GOUT, ARTHRITIS: Primary | ICD-10-CM

## 2020-10-21 PROCEDURE — G0463 HOSPITAL OUTPT CLINIC VISIT: HCPCS | Performed by: PEDIATRICS

## 2020-10-21 PROCEDURE — G8536 NO DOC ELDER MAL SCRN: HCPCS | Performed by: PEDIATRICS

## 2020-10-21 PROCEDURE — G8419 CALC BMI OUT NRM PARAM NOF/U: HCPCS | Performed by: PEDIATRICS

## 2020-10-21 PROCEDURE — 1101F PT FALLS ASSESS-DOCD LE1/YR: CPT | Performed by: PEDIATRICS

## 2020-10-21 PROCEDURE — G8510 SCR DEP NEG, NO PLAN REQD: HCPCS | Performed by: PEDIATRICS

## 2020-10-21 PROCEDURE — 99214 OFFICE O/P EST MOD 30 MIN: CPT | Performed by: PEDIATRICS

## 2020-10-21 PROCEDURE — G8427 DOCREV CUR MEDS BY ELIG CLIN: HCPCS | Performed by: PEDIATRICS

## 2020-10-21 NOTE — PROGRESS NOTES
Chief Complaint   Patient presents with    Joint Pain     1. Have you been to the ER, urgent care clinic since your last visit? Hospitalized since your last visit? No    2. Have you seen or consulted any other health care providers outside of the [address] [place] [address] since your last visit? Include any pap smears or colon screening.  No

## 2020-10-21 NOTE — PROGRESS NOTES
RHEUMATOLOGY PROBLEM LIST AND CHIEF COMPLAINT  1. 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 CPPD    INTERVAL HISTORY  This is a 67 y.o.  male. Today, the patient complains of no generalized pain. Location: none  Severity: 0 on a scale of 0-10  Timing: none  Duration:  9 months  Context/Associated signs and symptoms: The patient reports doing well today with no episodes of gout since last visit. His last uric acid was 2.9 in 7/2019. He continues with Allopurinol 600 mg daily. He states his kidney function is stable. He denies new medications or medical issues. RHEUMATOLOGY REVIEW OF SYSTEMS   Positives as per history  Negatives as follows:  Nikole Mcnallyk:  Denies unexplained persistent fevers, weight change, chronic fatigue  HEAD/EYES:   Denies eye redness, blurry vision or sudden loss of vision, dry eyes, HA, temporal artery pain  ENT:    Denies oral/nasal ulcers, recurrent sinus infections, dry mouth  RESPIRATORY:  No pleuritic pain, history of pleural effusions, hemoptysis, exertional dyspnea  CARDIOVASCULAR:  Denies chest pain, history of pericardial effusions  GASTRO:   Denies heartburn, esophageal dysmotility, abdominal pain, nausea, vomiting, diarrhea, blood in the stool  HEMATOLOGIC:  No easy bruising, purpura, swollen lymph nodes  SKIN:    Denies alopecia, ulcers, nodules, sun sensitivity, unexplained persistent rash   VASCULAR:   Denies edema, cyanosis, raynaud phenomenon  NEUROLOGIC:  Denies specific muscle weakness, paresthesias   PSYCHIATRIC:  No sleep disturbance / snoring, depression, anxiety  MSK:    No morning stiffness >1 hour, SI joint pain, persistent joint swelling, persistent joint pain    PAST MEDICAL HISTORY  Reviewed with patient, significant changes in medical history - no     PHYSICAL EXAM  Blood pressure 138/72, pulse 71, temperature 97.6 °F (36.4 °C), temperature source Temporal, resp. rate 16, weight 212 lb (96.2 kg), SpO2 92 %.   GENERAL APPEARANCE: Well-nourished adult in no acute distress. Walking with a cane. EYES: No scleral erythema, conjunctival injection. ENT: No oral ulcer, parotid enlargement. NECK: No adenopathy, thyroid enlargement. CARDIOVASCULAR: Heart rhythm is regular. No murmur, rub, gallop. CHEST: Normal vesicular breath sounds. No wheezes, rales, pleural friction rubs. ABDOMINAL: The abdomen is soft and nontender. Liver and spleen are nonpalpable. Bowel sounds are normal.  EXTREMITIES: There is no evidence of clubbing, cyanosis, edema. SKIN: No rash, palpable purpura, digital ulcer, abnormal thickening. NEUROLOGICAL: Normal gait and station, full strength in upper and lower extremities, normal sensation to light touch. MUSCULOSKELETAL:   Upper extremities - OA changes noted. B/L Hands mild dROM, no synovitis. dROM b/l 3rd digits - unchanged  Lower extremities - full range of motion, no tenderness, no swelling, no synovial thickening and no deformity of joints     LABS, RADIOLOGY AND PROCEDURES   Previous labs reviewed -Yes    Uric Acid  12/2017 - 5.2  1/2018 - 4.6  4/2018 - 3.0    ASSESSMENT  1. Gout - Stable - The patient's gout is currently stable. His last uric acid (7/2019) was 2.9. He should obtain a repeat uric acid with next labs drawn by primary care. I recommend he continue on Allopurinol 600 mg daily. We will continue to monitor his kidney function, if this worsens we will consider decreasing the dose of Allopurinol. I will provide a prescription for Prednisone if patient flares. He should return in 1 year. 2. Osteoarthritis - This was not a concern today. He can continue with tylenol PRN and copper gloves which have provided some relief. PLAN  1. Allopurinol 600 mg daily  2. Tylenol 500 mg PRN  3. Return in 1 year    Linh Meredith MD  Adult and Pediatric Rheumatology     [facility], [address] [facility], Phone [phone], Fax [phone]     Visiting  of Pediatrics Department of Pediatrics, [facility] of [address], [address] [place] [address], Phone [phone], Fax [phone]    There are no Patient Instructions on file for this visit.     cc:  MD Racihd Silva    Written by sky Pope, as dictated by Dr. Ellis Payne M.D.

## 2020-10-22 ENCOUNTER — TELEPHONE (OUTPATIENT)
Dept: RHEUMATOLOGY | Age: 75
End: 2020-10-22

## 2020-10-22 NOTE — TELEPHONE ENCOUNTER
Spoke to  at Dr Briant Dancer office requested the latest lab results on the pt be faxed to our office,  verbally acknowledged understanding and stated she will fax them over

## 2020-10-22 NOTE — TELEPHONE ENCOUNTER
----- Message from Alexander Ford MD sent at 10/21/2020 10:45 AM EDT -----  Dr. Percy Flores - get labs that were done this year (most recnet only) loyd

## 2020-11-11 RX ORDER — ALLOPURINOL 300 MG/1
TABLET ORAL
Qty: 180 TAB | Refills: 2 | Status: SHIPPED | OUTPATIENT
Start: 2020-11-11 | End: 2021-07-26

## 2021-07-26 RX ORDER — ALLOPURINOL 300 MG/1
TABLET ORAL
Qty: 180 TABLET | Refills: 2 | Status: SHIPPED | OUTPATIENT
Start: 2021-07-26 | End: 2021-10-26 | Stop reason: SDUPTHER

## 2021-10-26 ENCOUNTER — OFFICE VISIT (OUTPATIENT)
Dept: RHEUMATOLOGY | Age: 76
End: 2021-10-26
Payer: MEDICARE

## 2021-10-26 VITALS
RESPIRATION RATE: 16 BRPM | OXYGEN SATURATION: 92 % | TEMPERATURE: 97.9 F | WEIGHT: 206.8 LBS | BODY MASS INDEX: 28.05 KG/M2 | DIASTOLIC BLOOD PRESSURE: 71 MMHG | SYSTOLIC BLOOD PRESSURE: 123 MMHG | HEART RATE: 78 BPM

## 2021-10-26 DIAGNOSIS — M10.9 GOUT, ARTHRITIS: Primary | ICD-10-CM

## 2021-10-26 PROCEDURE — G0463 HOSPITAL OUTPT CLINIC VISIT: HCPCS | Performed by: PEDIATRICS

## 2021-10-26 PROCEDURE — 1101F PT FALLS ASSESS-DOCD LE1/YR: CPT | Performed by: PEDIATRICS

## 2021-10-26 PROCEDURE — 99213 OFFICE O/P EST LOW 20 MIN: CPT | Performed by: PEDIATRICS

## 2021-10-26 PROCEDURE — G8419 CALC BMI OUT NRM PARAM NOF/U: HCPCS | Performed by: PEDIATRICS

## 2021-10-26 PROCEDURE — G8510 SCR DEP NEG, NO PLAN REQD: HCPCS | Performed by: PEDIATRICS

## 2021-10-26 PROCEDURE — G8536 NO DOC ELDER MAL SCRN: HCPCS | Performed by: PEDIATRICS

## 2021-10-26 PROCEDURE — G8427 DOCREV CUR MEDS BY ELIG CLIN: HCPCS | Performed by: PEDIATRICS

## 2021-10-26 RX ORDER — ALLOPURINOL 300 MG/1
600 TABLET ORAL DAILY
Qty: 180 TABLET | Refills: 2 | Status: SHIPPED | OUTPATIENT
Start: 2021-10-26 | End: 2022-10-24 | Stop reason: SDUPTHER

## 2021-10-26 NOTE — PROGRESS NOTES
RHEUMATOLOGY PROBLEM LIST AND CHIEF COMPLAINT  1. 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 CPPD  COVID-19 vaccination (+, Moderna)    INTERVAL HISTORY  This is a 67 y.o.  male. Today, the patient complains of no generalized pain. Location: none  Severity: 0 on a scale of 0-10  Timing: none  Duration:  9 months  Context/Associated signs and symptoms: The patient reports doing well today with no episodes of gout since last visit. He reports occasional pain in his toe. He continues with Allopurinol 600 mg daily. He states his kidney function is stable. He denies new medications or medical issues. Of note, he states that has been vaccinated against COVID-19 including his booster shot.      RHEUMATOLOGY REVIEW OF SYSTEMS   Positives as per history  Negatives as follows:  Felicia Andersoning:  Denies unexplained persistent fevers, weight change, chronic fatigue  HEAD/EYES:   Denies eye redness, blurry vision or sudden loss of vision, dry eyes, HA, temporal artery pain  ENT:    Denies oral/nasal ulcers, recurrent sinus infections, dry mouth  RESPIRATORY:  No pleuritic pain, history of pleural effusions, hemoptysis, exertional dyspnea  CARDIOVASCULAR:  Denies chest pain, history of pericardial effusions  GASTRO:   Denies heartburn, esophageal dysmotility, abdominal pain, nausea, vomiting, diarrhea, blood in the stool  HEMATOLOGIC:  No easy bruising, purpura, swollen lymph nodes  SKIN:    Denies alopecia, ulcers, nodules, sun sensitivity, unexplained persistent rash   VASCULAR:   Denies edema, cyanosis, raynaud phenomenon  NEUROLOGIC:  Denies specific muscle weakness, paresthesias   PSYCHIATRIC:  No sleep disturbance / snoring, depression, anxiety  MSK:    No morning stiffness >1 hour, SI joint pain, persistent joint swelling, persistent joint pain    PAST MEDICAL HISTORY  Reviewed with patient, significant changes in medical history - no     PHYSICAL EXAM  Blood pressure 123/71, pulse 78, temperature 97.9 °F (36.6 °C), temperature source Oral, resp. rate 16, weight 206 lb 12.8 oz (93.8 kg), SpO2 92 %. GENERAL APPEARANCE: Well-nourished adult in no acute distress. Walking with a cane. EYES: No scleral erythema, conjunctival injection. ENT: No oral ulcer, parotid enlargement. NECK: No adenopathy, thyroid enlargement. CARDIOVASCULAR: Heart rhythm is regular. No murmur, rub, gallop. CHEST: Normal vesicular breath sounds. No wheezes, rales, pleural friction rubs. ABDOMINAL: The abdomen is soft and nontender. Liver and spleen are nonpalpable. Bowel sounds are normal.  EXTREMITIES: There is no evidence of clubbing, cyanosis, edema. SKIN: No rash, palpable purpura, digital ulcer, abnormal thickening. NEUROLOGICAL: Normal gait and station, full strength in upper and lower extremities, normal sensation to light touch. MUSCULOSKELETAL:   Upper extremities - OA changes noted. B/L Hands mild dROM, no synovitis. dROM b/l 3rd digits - unchanged  Lower extremities - full range of motion, no tenderness, no swelling, no synovial thickening and no deformity of joints     LABS, RADIOLOGY AND PROCEDURES   Previous labs reviewed -Yes    Uric Acid  12/2017 - 5.2  1/2018 - 4.6  4/2018 - 3.0    ASSESSMENT  1. Gout -(is unchanged)- The patient's disease continues to be well managed with no recent flares. He should obtain a repeat uric acid with next labs drawn by primary care - I will provide patient with orders today. I recommend he continue on Allopurinol 600 mg daily. We will continue to monitor his kidney function, if this worsens we will consider decreasing the dose of Allopurinol. I will provide a prescription for Prednisone if patient flares. He should return in 1 year. 2. Osteoarthritis - This was not discussed at length today. He can continue with tylenol PRN and copper gloves which have provided some relief. PLAN  1. Allopurinol 600 mg daily   2. Tylenol 500 mg PRN  3. Check CBC, CMP, uric acid   4.  Return in 1 year    Linh Angelina Womack MD  Adult and Pediatric Rheumatology     [facility], [address] [facility], Phone [phone], Fax [phone]     Visiting  of Pediatrics    Department of Pediatrics, [facility] of [address] [place] [address], [address] [place] [address], Phone [phone], Fax [phone]    There are no Patient Instructions on file for this visit.     cc:  Ilona Leventhal, MD Aurea Staple     Written by sky Cordova, as dictated by Dr. Hugh Eldridge M.D.

## 2022-03-18 PROBLEM — N17.9 ACUTE RENAL FAILURE (ARF) (HCC): Status: ACTIVE | Noted: 2019-01-02

## 2022-03-18 PROBLEM — R33.9 URINARY RETENTION: Status: ACTIVE | Noted: 2019-01-07

## 2022-03-18 PROBLEM — M19.90 DJD (DEGENERATIVE JOINT DISEASE): Status: ACTIVE | Noted: 2019-01-07

## 2022-03-19 PROBLEM — R07.89 CHEST PRESSURE: Status: ACTIVE | Noted: 2017-04-15

## 2022-03-20 PROBLEM — M54.2 NECK PAIN: Status: ACTIVE | Noted: 2019-01-07

## 2022-03-20 PROBLEM — A04.72 C. DIFFICILE DIARRHEA: Status: ACTIVE | Noted: 2019-01-07

## 2022-03-20 PROBLEM — M1A.00X1 CHRONIC GOUTY ARTHROPATHY WITH TOPHI: Status: ACTIVE | Noted: 2017-02-24

## 2022-03-20 PROBLEM — Z79.52 LONG TERM (CURRENT) USE OF SYSTEMIC STEROIDS: Status: ACTIVE | Noted: 2017-05-24

## 2022-03-20 PROBLEM — E11.9 DM II (DIABETES MELLITUS, TYPE II), CONTROLLED (HCC): Status: ACTIVE | Noted: 2019-01-07

## 2022-05-20 NOTE — MR AVS SNAPSHOT
Visit Information Date & Time Provider Department Dept. Phone Encounter #  
 2/24/2017 11:30 AM Jamal Gilbert MD [facility] [id number] Follow-up Instructions Return in about 2 months (around 4/24/2017). Upcoming Health Maintenance Date Due  
 LIPID PANEL Q1 1945 FOOT EXAM Q1 5/27/1954 MICROALBUMIN Q1 5/27/1954 EYE EXAM RETINAL OR DILATED Q1 5/27/1954 FOBT Q 1 YEAR AGE 50-75 5/27/1994 ZOSTER VACCINE AGE 60> 5/27/2004 GLAUCOMA SCREENING Q2Y 5/27/2009 Pneumococcal 65+ High/Highest Risk (1 of 2 - PCV13) 5/27/2009 MEDICARE YEARLY EXAM 5/27/2009 DTaP/Tdap/Td series (1 - Tdap) 8/27/2011 HEMOGLOBIN A1C Q6M 3/17/2017 Allergies as of 2/24/2017  Review Complete On: 2/24/2017 By: Jamal Gilbert MD  
 No Known Allergies Current Immunizations  Reviewed on 12/13/2016 Name Date  
 TD Vaccine 8/26/2011  8:04 PM  
  
 Not reviewed this visit You Were Diagnosed With   
  
 Codes Comments Chronic gouty arthropathy with tophi    -  Primary ICD-10-CM: M1A.00X1 ICD-9-CM: 274.03   
 CKD (chronic kidney disease), stage 2 (mild)     ICD-10-CM: N18.2 ICD-9-CM: 585.2 Long-term use of high-risk medication     ICD-10-CM: Z79.899 ICD-9-CM: V58.69 Long term (current) use of systemic steroids     ICD-10-CM: Z79.52 
ICD-9-CM: V58.65 Essential hypertension     ICD-10-CM: I10 
ICD-9-CM: 401.9 Vitals BP  
  
  
  
  
  
 174/82 (BP 1 Location: Left arm, BP Patient Position: Sitting) BMI and BSA Data Body Mass Index Body Surface Area  
 25.88 kg/m 2 2.1 m 2 Preferred Pharmacy Pharmacy Name Phone [facility], [facility] [address] [place] [address] Drive [phone] Your Updated Medication List  
  
   
This list is accurate as of: 2/24/17 11:53 AM.  Always use your most recent med list.  
  
  
  
  
 allopurinol 100 mg tablet Commonly known as:  Rawnancy Salas Take 1 and 1/2 tabs po daily. THIS IS A NEW AND HIGHER DOSE.  
  
 atorvastatin 10 mg tablet Commonly known as:  LIPITOR  
TAKE 1 TABLET BY MOUTH ONCE A DAY cholecalciferol 1,000 unit Cap Commonly known as:  VITAMIN D3 Take  by mouth daily. colchicine 0.6 mg tablet Take 1 tab po daily or as directed  
  
 ferrous sulfate 325 mg (65 mg iron) tablet TAKE 1 TABLET BY MOUTH EVERY DAY  
  
 folic acid 1 mg tablet Commonly known as:  FOLVITE  
1 TABLET ONCE A DAY ORALLY 30 DAY(S)  
  
 glipiZIDE SR 5 mg CR tablet Commonly known as:  GLUCOTROL XL Take 5 mg by mouth two (2) times a day. glyBURIDE 2.5 mg tablet Commonly known as:  Megan Cárdenas Not taking. lisinopril 20 mg tablet Commonly known as:  Donnald Code Take  by mouth daily. metoprolol succinate 50 mg XL tablet Commonly known as:  TOPROL-XL Take 50 mg by mouth daily. oxyCODONE-acetaminophen 5-325 mg per tablet Commonly known as:  PERCOCET Take 1-2 Tabs by mouth every four (4) hours as needed for Pain. Max Daily Amount: 12 Tabs. pantoprazole 40 mg tablet Commonly known as:  PROTONIX Take 40 mg by mouth daily. predniSONE 5 mg tablet Commonly known as:  Yovana Abelardo Take 2 tabs po qam with breakfast  
  
 SENNA WITH DOCUSATE SODIUM 8.6-50 mg per tablet Generic drug:  senna-docusate Take 1 Tab by mouth two (2) times a day. Not taking  
  
 tamsulosin 0.4 mg capsule Commonly known as:  FLOMAX Take 0.4 mg by mouth daily. traMADol 50 mg tablet Commonly known as:  ULTRAM  
Take 50 mg by mouth every six (6) hours as needed for Pain. Follow-up Instructions Return in about 2 months (around 4/24/2017). To-Do List   
 02/24/2017 Imaging:  DEXA BONE DENSITY STUDY AXIAL   
  
 03/24/2017 Lab:  CBC WITH AUTOMATED DIFF   
  
 03/24/2017   Lab:  METABOLIC PANEL, COMPREHENSIVE   
  
 03/24/2017 Lab:  URIC ACID Patient Instructions Schedule bone density Colchicine 0.6 mg once daily. If diarrhea , lower back to every other day ( and call) Allopurinol 150 mg daily every day Next week, begin lowering prednisone if tolerated: 
1) For 1 week, 10 mg daily, alternating with 7.5 mg daily, then 2) For 1 week 7.5 mg daily (1 1/2 tabs), then 3) For 1 week, 7.5 mg daily, alternating with 5 mg daily, then 4) for 2 weeks, 5 mg daily, then 5) for 1 week, 5 mg daily, alternating with 2.5 mg daily, then 6) for 2 weeks, 2.5 mg (1/2 tab) daily, then 7) For 2 weeks, 2.5 mg every other day, then stop Labs in 4 weeks if no recent flare Introducing [facility]! Ruby Holly introduces [employer] patient portal. Now you can access parts of your medical record, email your doctor's office, and request medication refills online. 1. In your internet browser, go to [email or web address] 2. Click on the First Time User? Click Here link in the Sign In box. You will see the New Member Sign Up page. 3. Enter your [employer] Access Code exactly as it appears below. You will not need to use this code after youve completed the sign-up process. If you do not sign up before the expiration date, you must request a new code. · [employer] Access Code: [email or web address] Expires: 3/13/2017  2:08 PM 
 
4. Enter the last four digits of your Social Security Number (xxxx) and Date of Birth (mm/dd/yyyy) as indicated and click Submit. You will be taken to the next sign-up page. 5. Create a [employer] ID. This will be your [employer] login ID and cannot be changed, so think of one that is secure and easy to remember. 6. Create a [employer] password. You can change your password at any time. 7. Enter your Password Reset Question and Answer. This can be used at a later time if you forget your password. 8. Enter your e-mail address.  You will receive e-mail notification when new information is available in [employer]. 9. Click Sign Up. You can now view and download portions of your medical record. 10. Click the Download Summary menu link to download a portable copy of your medical information. If you have questions, please visit the Frequently Asked Questions section of the [employer] website. Remember, [employer] is NOT to be used for urgent needs. For medical emergencies, dial 911. Now available from your iPhone and Android! Please provide this summary of care documentation to your next provider. Your primary care clinician is listed as John Parsons. If you have any questions after today's visit, please call [phone]. Double O-Z Flap Text: The defect edges were debeveled with a #15 scalpel blade.  Given the location of the defect, shape of the defect and the proximity to free margins a Double O-Z flap was deemed most appropriate.  Using a sterile surgical marker, an appropriate transposition flap was drawn incorporating the defect and placing the expected incisions within the relaxed skin tension lines where possible. The area thus outlined was incised deep to adipose tissue with a #15 scalpel blade.  The skin margins were undermined to an appropriate distance in all directions utilizing iris scissors.

## 2022-10-24 ENCOUNTER — OFFICE VISIT (OUTPATIENT)
Dept: RHEUMATOLOGY | Age: 77
End: 2022-10-24
Payer: MEDICARE

## 2022-10-24 VITALS
DIASTOLIC BLOOD PRESSURE: 74 MMHG | HEART RATE: 78 BPM | WEIGHT: 210 LBS | TEMPERATURE: 98.3 F | OXYGEN SATURATION: 94 % | RESPIRATION RATE: 16 BRPM | SYSTOLIC BLOOD PRESSURE: 147 MMHG | BODY MASS INDEX: 28.48 KG/M2

## 2022-10-24 DIAGNOSIS — M10.9 GOUT, ARTHRITIS: Primary | ICD-10-CM

## 2022-10-24 PROCEDURE — 99214 OFFICE O/P EST MOD 30 MIN: CPT | Performed by: PEDIATRICS

## 2022-10-24 PROCEDURE — 1101F PT FALLS ASSESS-DOCD LE1/YR: CPT | Performed by: PEDIATRICS

## 2022-10-24 PROCEDURE — G8536 NO DOC ELDER MAL SCRN: HCPCS | Performed by: PEDIATRICS

## 2022-10-24 PROCEDURE — 1123F ACP DISCUSS/DSCN MKR DOCD: CPT | Performed by: PEDIATRICS

## 2022-10-24 PROCEDURE — G8427 DOCREV CUR MEDS BY ELIG CLIN: HCPCS | Performed by: PEDIATRICS

## 2022-10-24 PROCEDURE — G0463 HOSPITAL OUTPT CLINIC VISIT: HCPCS | Performed by: PEDIATRICS

## 2022-10-24 PROCEDURE — G8510 SCR DEP NEG, NO PLAN REQD: HCPCS | Performed by: PEDIATRICS

## 2022-10-24 PROCEDURE — G8419 CALC BMI OUT NRM PARAM NOF/U: HCPCS | Performed by: PEDIATRICS

## 2022-10-24 RX ORDER — ALLOPURINOL 300 MG/1
600 TABLET ORAL DAILY
Qty: 180 TABLET | Refills: 2 | Status: SHIPPED | OUTPATIENT
Start: 2022-10-24

## 2022-10-24 NOTE — PROGRESS NOTES
RHEUMATOLOGY PROBLEM LIST AND CHIEF COMPLAINT  1. 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 CPPD  COVID-19 vaccination (+, Moderna)    INTERVAL HISTORY  This is a 65 y.o.  male. Today, the patient complains of no generalized pain. Location: none  Severity: 0 on a scale of 0-10  Timing: none  Duration:  12 months  Context/Associated signs and symptoms: The patient reports doing well overall. He continues on allopurinol 600 mg daily. He denies any recent gout flares. He continues to follow with Dr. Antony Murphy, nephrology, for CKD. States that he was told his kidney function was stable and he could continue on current medication at his last appointment with Dr. Antony Murphy. He states he had lab work done with his PCP about 3 months ago.      RHEUMATOLOGY REVIEW OF SYSTEMS   Positives as per history  Negatives as follows:  Jasmin Camarena:  Denies unexplained persistent fevers, weight change, chronic fatigue  HEAD/EYES:   Denies eye redness, blurry vision or sudden loss of vision, dry eyes, HA, temporal artery pain  ENT:    Denies oral/nasal ulcers, recurrent sinus infections, dry mouth  RESPIRATORY:  No pleuritic pain, history of pleural effusions, hemoptysis, exertional dyspnea  CARDIOVASCULAR:  Denies chest pain, history of pericardial effusions  GASTRO:   Denies heartburn, esophageal dysmotility, abdominal pain, nausea, vomiting, diarrhea, blood in the stool  HEMATOLOGIC:  No easy bruising, purpura, swollen lymph nodes  SKIN:    Denies alopecia, ulcers, nodules, sun sensitivity, unexplained persistent rash   VASCULAR:   Denies edema, cyanosis, raynaud phenomenon  NEUROLOGIC:  Denies specific muscle weakness, paresthesias   PSYCHIATRIC:  No sleep disturbance / snoring, depression, anxiety  MSK:    No morning stiffness >1 hour, SI joint pain, persistent joint swelling, persistent joint pain    PAST MEDICAL HISTORY  Reviewed with patient, significant changes in medical history - no     PHYSICAL EXAM  Blood pressure (!) 147/74, pulse 78, temperature 98.3 °F (36.8 °C), temperature source Oral, resp. rate 16, weight 210 lb (95.3 kg), SpO2 94 %. GENERAL APPEARANCE: Well-nourished adult in no acute distress. Walking with a cane. EYES: No scleral erythema, conjunctival injection. ENT: No oral ulcer, parotid enlargement. NECK: No adenopathy, thyroid enlargement. CARDIOVASCULAR: Heart rhythm is regular. No murmur, rub, gallop. CHEST: Normal vesicular breath sounds. No wheezes, rales, pleural friction rubs. ABDOMINAL: The abdomen is soft and nontender. Liver and spleen are nonpalpable. Bowel sounds are normal.  EXTREMITIES: There is no evidence of clubbing, cyanosis, edema. SKIN: No rash, palpable purpura, digital ulcer, abnormal thickening. NEUROLOGICAL: Normal gait and station, full strength in upper and lower extremities, normal sensation to light touch. MUSCULOSKELETAL:   Upper extremities - OA changes noted. B/L Hands mild dROM, no synovitis. dROM b/l 3rd digits - unchanged  Lower extremities - full range of motion, no tenderness, no swelling, no synovial thickening and no deformity of joints     LABS, RADIOLOGY AND PROCEDURES   Previous labs reviewed -Yes    Uric Acid  12/2017 - 5.2  1/2018 - 4.6  4/2018 - 3.0    ASSESSMENT  1. Gout -(is unchanged)- The patient's disease continues to be well managed with no recent flares. I recommend he continue on Allopurinol 600 mg daily. 2. Osteoarthritis - This was not discussed at length today. He can continue with tylenol PRN and copper gloves which have provided some relief. PLAN  1. Allopurinol 600 mg daily   2. Tylenol 500 mg PRN  3. Obtain lab results from PCP  4. Return in 1 year    Linh Waldron MD  Adult and Pediatric Rheumatology     [facility], [address] [place] [address], Phone [phone], Fax [phone]     Visiting  of Pediatrics    Department of Pediatrics, [place] [id number] [facility] [address] [place] [address], Phone [phone], Fax [phone]    There are no Patient Instructions on file for this visit. cc:  MD Bruce Rayo     Written by sky Rodriguez, as dictated by Dr. Mignon Small M.D. I have reviewed and agree with the note as scribed.

## 2022-10-24 NOTE — PROGRESS NOTES
Chief Complaint   Patient presents with    Joint Pain     1. Have you been to the ER, urgent care clinic since your last visit? Hospitalized since your last visit? No    2. Have you seen or consulted any other health care providers outside of the [address] [place] [address] since your last visit? Include any pap smears or colon screening.  No

## 2023-02-02 RX ORDER — ACETAMINOPHEN 500 MG
TABLET ORAL
COMMUNITY

## 2023-02-02 RX ORDER — LANOLIN ALCOHOL/MO/W.PET/CERES
CREAM (GRAM) TOPICAL
COMMUNITY

## 2023-02-02 NOTE — PERIOP NOTES
[facility]  Preoperative Instructions        Surgery Date 02/08/23        Time of Arrival --to be called  Contact # [phone]    1. On the day of your surgery, please report to the Surgical Services Registration Desk and sign in at your designated time. The Surgery Center is located to the right of the Emergency Room. 2. You must have someone with you to drive you home. You should not drive a car for 24 hours following surgery. Please make arrangements for a friend or family member to stay with you for the first 24 hours after your surgery. 3. Do not have anything to eat or drink (including water, gum, mints, coffee, juice) after midnight ?02/07/23 . ? This may not apply to medications prescribed by your physician. ?(Please note below the special instructions with medications to take the morning of your procedure.)    4. We recommend you do not drink any alcoholic beverages for 24 hours before and after your surgery. 5. Contact your surgeons office for instructions on the following medications: non-steroidal anti-inflammatory drugs (i.e. Advil, Aleve), vitamins, and supplements. (Some surgeons will want you to stop these medications prior to surgery and others may allow you to take them)  **If you are currently taking Plavix, Coumadin, Aspirin and/or other blood-thinning agents, contact your surgeon for instructions. ** Your surgeon will partner with the physician prescribing these medications to determine if it is safe to stop or if you need to continue taking. Please do not stop taking these medications without instructions from your surgeon    6. Wear comfortable clothes. Wear glasses instead of contacts. Do not bring any money or jewelry. Please bring picture ID, insurance card, and any prearranged co-payment or hospital payment. Do not wear make-up, particularly mascara the morning of your surgery.   Do not wear nail polish, particularly if you are having foot /hand surgery. Wear your hair loose or down, no ponytails, buns, humberto pins or clips. All body piercings must be removed. Please shower with antibacterial soap for three consecutive days before and on the morning of surgery, but do not apply any lotions, powders or deodorants after the shower on the day of surgery. Please use a fresh towels after each shower. Please sleep in clean clothes and change bed linens the night before surgery. Please do not shave for 48 hours prior to surgery. Shaving of the face is acceptable. 7. You should understand that if you do not follow these instructions your surgery may be cancelled. If your physical condition changes (I.e. fever, cold or flu) please contact your surgeon as soon as possible. 8. It is important that you be on time. If a situation occurs where you may be late, please call [phone] (OR Holding Area). 9. If you have any questions and or problems, please call [phone] (Pre-admission Testing). 10. Your surgery time may be subject to change. You will receive a phone call the evening prior if your time changes. 11.  If having outpatient surgery, you must have someone to drive you here, stay with you during the duration of your stay, and to drive you home at time of discharge. Special Instructions: Bring inhaler --use as needed  Follow surgeon office for supplements/vitamins  TAKE ALL MEDICATIONS DAY OF SURGERY EXCEPT:no diabetic meds,supplements,vitamins,aspirin      I understand a pre-operative phone call will be made to verify my surgery time. In the event that I am not available, I give permission for a message to be left on my answering service and/or with another person?   yes         ___________________      __________   _________    (Signature of Patient)             (Witness)                (Date and Time)

## 2023-02-08 ENCOUNTER — HOSPITAL ENCOUNTER (OUTPATIENT)
Age: 78
Setting detail: OUTPATIENT SURGERY
Discharge: HOME | End: 2023-02-08
Attending: PLASTIC SURGERY | Admitting: PLASTIC SURGERY
Payer: MEDICARE

## 2023-02-08 ENCOUNTER — ANESTHESIA (OUTPATIENT)
Dept: SURGERY | Age: 78
End: 2023-02-08
Payer: MEDICARE

## 2023-02-08 ENCOUNTER — ANESTHESIA EVENT (OUTPATIENT)
Dept: SURGERY | Age: 78
End: 2023-02-08
Payer: MEDICARE

## 2023-02-08 VITALS
DIASTOLIC BLOOD PRESSURE: 58 MMHG | WEIGHT: 206.35 LBS | OXYGEN SATURATION: 95 % | BODY MASS INDEX: 27.95 KG/M2 | SYSTOLIC BLOOD PRESSURE: 133 MMHG | HEIGHT: 72 IN | TEMPERATURE: 98 F | HEART RATE: 70 BPM | RESPIRATION RATE: 14 BRPM

## 2023-02-08 DIAGNOSIS — D17.0 LIPOMA OF SCALP: Primary | ICD-10-CM

## 2023-02-08 LAB
GLUCOSE BLD STRIP.AUTO-MCNC: 134 MG/DL (ref 65–117)
GLUCOSE BLD STRIP.AUTO-MCNC: 139 MG/DL (ref 65–117)
SERVICE CMNT-IMP: ABNORMAL
SERVICE CMNT-IMP: ABNORMAL

## 2023-02-08 PROCEDURE — 76010000149 HC OR TIME 1 TO 1.5 HR: Performed by: PLASTIC SURGERY

## 2023-02-08 PROCEDURE — 77030032060 HC PWDR HEMSTAT ARISTA ASRB 3GM BARD -C: Performed by: PLASTIC SURGERY

## 2023-02-08 PROCEDURE — 76210000006 HC OR PH I REC 0.5 TO 1 HR: Performed by: PLASTIC SURGERY

## 2023-02-08 PROCEDURE — 77030013629 HC ELECTRD NDL STRY -B: Performed by: PLASTIC SURGERY

## 2023-02-08 PROCEDURE — 74011250636 HC RX REV CODE- 250/636: Performed by: ANESTHESIOLOGY

## 2023-02-08 PROCEDURE — 74011000272 HC RX REV CODE- 272: Performed by: PLASTIC SURGERY

## 2023-02-08 PROCEDURE — 88304 TISSUE EXAM BY PATHOLOGIST: CPT

## 2023-02-08 PROCEDURE — 74011250637 HC RX REV CODE- 250/637: Performed by: PLASTIC SURGERY

## 2023-02-08 PROCEDURE — 76060000033 HC ANESTHESIA 1 TO 1.5 HR: Performed by: PLASTIC SURGERY

## 2023-02-08 PROCEDURE — 76210000021 HC REC RM PH II 0.5 TO 1 HR: Performed by: PLASTIC SURGERY

## 2023-02-08 PROCEDURE — 2709999900 HC NON-CHARGEABLE SUPPLY: Performed by: PLASTIC SURGERY

## 2023-02-08 PROCEDURE — 74011000250 HC RX REV CODE- 250: Performed by: PLASTIC SURGERY

## 2023-02-08 PROCEDURE — 82962 GLUCOSE BLOOD TEST: CPT

## 2023-02-08 PROCEDURE — 74011250636 HC RX REV CODE- 250/636: Performed by: REGISTERED NURSE

## 2023-02-08 PROCEDURE — 77030002986 HC SUT PROL J&J -A: Performed by: PLASTIC SURGERY

## 2023-02-08 PROCEDURE — 74011000250 HC RX REV CODE- 250: Performed by: REGISTERED NURSE

## 2023-02-08 RX ORDER — FENTANYL CITRATE 50 UG/ML
25 INJECTION, SOLUTION INTRAMUSCULAR; INTRAVENOUS
Status: DISCONTINUED | OUTPATIENT
Start: 2023-02-08 | End: 2023-02-08 | Stop reason: HOSPADM

## 2023-02-08 RX ORDER — SODIUM CHLORIDE, SODIUM LACTATE, POTASSIUM CHLORIDE, CALCIUM CHLORIDE 600; 310; 30; 20 MG/100ML; MG/100ML; MG/100ML; MG/100ML
25 INJECTION, SOLUTION INTRAVENOUS CONTINUOUS
Status: DISCONTINUED | OUTPATIENT
Start: 2023-02-08 | End: 2023-02-08 | Stop reason: HOSPADM

## 2023-02-08 RX ORDER — MIDAZOLAM HYDROCHLORIDE 1 MG/ML
1 INJECTION, SOLUTION INTRAMUSCULAR; INTRAVENOUS AS NEEDED
Status: DISCONTINUED | OUTPATIENT
Start: 2023-02-08 | End: 2023-02-08 | Stop reason: HOSPADM

## 2023-02-08 RX ORDER — FENTANYL CITRATE 50 UG/ML
50 INJECTION, SOLUTION INTRAMUSCULAR; INTRAVENOUS AS NEEDED
Status: DISCONTINUED | OUTPATIENT
Start: 2023-02-08 | End: 2023-02-08 | Stop reason: HOSPADM

## 2023-02-08 RX ORDER — ONDANSETRON 2 MG/ML
4 INJECTION INTRAMUSCULAR; INTRAVENOUS AS NEEDED
Status: DISCONTINUED | OUTPATIENT
Start: 2023-02-08 | End: 2023-02-08 | Stop reason: HOSPADM

## 2023-02-08 RX ORDER — HYDROMORPHONE HYDROCHLORIDE 1 MG/ML
.2-.5 INJECTION, SOLUTION INTRAMUSCULAR; INTRAVENOUS; SUBCUTANEOUS
Status: DISCONTINUED | OUTPATIENT
Start: 2023-02-08 | End: 2023-02-08 | Stop reason: HOSPADM

## 2023-02-08 RX ORDER — HYDROCODONE BITARTRATE AND ACETAMINOPHEN 5; 325 MG/1; MG/1
1 TABLET ORAL
Qty: 5 TABLET | Refills: 0 | Status: SHIPPED | OUTPATIENT
Start: 2023-02-08 | End: 2023-02-11

## 2023-02-08 RX ORDER — LIDOCAINE HYDROCHLORIDE AND EPINEPHRINE 10; 10 MG/ML; UG/ML
INJECTION, SOLUTION INFILTRATION; PERINEURAL AS NEEDED
Status: DISCONTINUED | OUTPATIENT
Start: 2023-02-08 | End: 2023-02-08 | Stop reason: HOSPADM

## 2023-02-08 RX ORDER — LIDOCAINE HYDROCHLORIDE 20 MG/ML
INJECTION, SOLUTION EPIDURAL; INFILTRATION; INTRACAUDAL; PERINEURAL AS NEEDED
Status: DISCONTINUED | OUTPATIENT
Start: 2023-02-08 | End: 2023-02-08 | Stop reason: HOSPADM

## 2023-02-08 RX ORDER — PROPOFOL 10 MG/ML
INJECTION, EMULSION INTRAVENOUS AS NEEDED
Status: DISCONTINUED | OUTPATIENT
Start: 2023-02-08 | End: 2023-02-08 | Stop reason: HOSPADM

## 2023-02-08 RX ORDER — CEFAZOLIN SODIUM/WATER 2 G/20 ML
SYRINGE (ML) INTRAVENOUS AS NEEDED
Status: DISCONTINUED | OUTPATIENT
Start: 2023-02-08 | End: 2023-02-08 | Stop reason: HOSPADM

## 2023-02-08 RX ORDER — LIDOCAINE HYDROCHLORIDE 10 MG/ML
0.1 INJECTION, SOLUTION EPIDURAL; INFILTRATION; INTRACAUDAL; PERINEURAL AS NEEDED
Status: DISCONTINUED | OUTPATIENT
Start: 2023-02-08 | End: 2023-02-08 | Stop reason: HOSPADM

## 2023-02-08 RX ADMIN — SODIUM CHLORIDE, POTASSIUM CHLORIDE, SODIUM LACTATE AND CALCIUM CHLORIDE 25 ML/HR: 600; 310; 30; 20 INJECTION, SOLUTION INTRAVENOUS at 11:20

## 2023-02-08 RX ADMIN — PROPOFOL 10 MG: 10 INJECTION, EMULSION INTRAVENOUS at 13:25

## 2023-02-08 RX ADMIN — Medication 3 AMPULE: at 11:20

## 2023-02-08 RX ADMIN — PROPOFOL 75 MCG/KG/MIN: 10 INJECTION, EMULSION INTRAVENOUS at 12:37

## 2023-02-08 RX ADMIN — PROPOFOL 80 MG: 10 INJECTION, EMULSION INTRAVENOUS at 12:36

## 2023-02-08 RX ADMIN — PROPOFOL 20 MG: 10 INJECTION, EMULSION INTRAVENOUS at 12:47

## 2023-02-08 RX ADMIN — Medication 2 G: at 12:44

## 2023-02-08 RX ADMIN — LIDOCAINE HYDROCHLORIDE 40 MG: 20 INJECTION, SOLUTION EPIDURAL; INFILTRATION; INTRACAUDAL; PERINEURAL at 12:36

## 2023-02-08 NOTE — BRIEF OP NOTE
Brief Postoperative Note    Patient: Yoshi Collins  YOB: 1945  MRN: [id number]    Date of Procedure: 2/8/2023     Pre-Op Diagnosis: LIPOMA    Post-Op Diagnosis: Same as preoperative diagnosis.       Procedure(s):  EXCISION RIGHT SCALP LIPOMA (8cm subfascial)    Surgeon(s):  Sirena Reddy MD    Surgical Assistant: Surg Asst-1: Ballard Frankel D    Anesthesia: MAC     Estimated Blood Loss (mL): less than 50     Complications: None    Specimens:   ID Type Source Tests Collected by Time Destination   1 : Right scalp lipoma  Preservative Scalp  Sirena Reddy MD 2/8/2023 1250 Pathology        Implants: * No implants in log *    Drains: * No LDAs found *    Findings: subfascial lipoma    Electronically Signed by Titi Gutierrez MD on 2/8/2023 at 1:37 PM

## 2023-02-08 NOTE — PERIOP NOTES
1049 - PT DENIES FEVER, COLD, COUGH, SOB, N/V, DIARRHEA. .. PRE-OP TCHING DONE - PT VERBALIZES UNDERSTANDING. STRETCHER IN LOWEST POSITION, CB IN PLACE AND S RUP X2.

## 2023-02-08 NOTE — ANESTHESIA PREPROCEDURE EVALUATION
Relevant Problems   RENAL FAILURE   (+) Acute renal failure (ARF) (HCC)      ENDOCRINE   (+) DM II (diabetes mellitus, type II), controlled ([address]       Anesthetic History   No history of anesthetic complications            Review of Systems / Medical History  Patient summary reviewed, nursing notes reviewed and pertinent labs reviewed    Pulmonary  Within defined limits                 Neuro/Psych   Within defined limits           Cardiovascular    Hypertension              Exercise tolerance: >4 METS     GI/Hepatic/Renal  Within defined limits              Endo/Other    Diabetes: type 2    Arthritis     Other Findings              Physical Exam    Airway  Mallampati: II  TM Distance: 4 - 6 cm  Neck ROM: normal range of motion   Mouth opening: Normal     Cardiovascular  Regular rate and rhythm,  S1 and S2 normal,  no murmur, click, rub, or gallop  Rhythm: regular  Rate: normal         Dental  No notable dental hx       Pulmonary  Breath sounds clear to auscultation               Abdominal  GI exam deferred       Other Findings            Anesthetic Plan    ASA: 2  Anesthesia type: MAC          Induction: Intravenous  Anesthetic plan and risks discussed with: Patient

## 2023-02-08 NOTE — DISCHARGE INSTRUCTIONS
May shower tomorrow and wash hair and incisions. No heavy lifting or vigorous activity for a few days. DISCHARGE SUMMARY from Nurse    PATIENT INSTRUCTIONS:    After general anesthesia or intravenous sedation, for 24 hours or while taking prescription narcotics:    Have someone responsible help you with your care  Limit your activities  Do not drive and operate hazardous machinery  Do not make important personal, legal or business decisions  Do not drink alcoholic beverages  If you have not urinated within 8 hours after discharge, please contact your surgeon on call  Resume your medications unless otherwise instructed    From general anesthesia, intravenous sedation, or while taking prescription narcotics, you may experience:    Drowsiness, dizziness, sleepiness, or confusion  Difficulty remembering or delayed reaction times  Difficulty with your balance, especially while walking, move slowly and carefully, do not make sudden position changes  Difficulty focusing or blurred vision    You may not be aware of slight changes in your behavior and/or your reaction time because of the medication used during and after your procedure. Report the following to your surgeon:  Excessive pain, swelling, redness or odor of or around the surgical area  Temperature over 100.5  Nausea and vomiting lasting longer than 4 hours or if unable to take medications  Any signs of decreased circulation or nerve impairment to extremity: change in color, persistent numbness, tingling, coldness or increase pain  Any questions or concerns         IF YOU REPORT TO AN EMERGENCY ROOM, DOCTOR'S OFFICE OR HOSPITAL WITHIN 24 HOURS AFTER YOUR PROCEDURE, BRING THIS SHEET AND YOUR AFTER VISIT SUMMARY WITH YOU AND GIVE IT TO THE PHYSICIAN OR NURSE ATTENDING YOU. These are general instructions for a healthy lifestyle (if applicable):     No smoking/ No tobacco products/ Avoid exposure to secondhand smoke  Surgeon General's Warning:  Quitting smoking now greatly reduces serious risk to your health. Obesity, smoking, and sedentary lifestyle greatly increases your risk for illness    A healthy diet, regular physical exercise & weight monitoring are important for maintaining a healthy lifestyle    You may be retaining fluid if you have a history of heart failure or if you experience any of the following symptoms:  Weight gain of 3 pounds or more overnight or 5 pounds in a week, increased swelling in our hands or feet or shortness of breath while lying flat in bed. Please call your doctor as soon as you notice any of these symptoms; do not wait until your next office visit. A common side effect of anesthesia following surgery is nausea and/or vomiting. In order to decrease symptoms, it is wise to avoid foods that are high in fat, greasy foods, milk products, and spicy foods for the first 24 hours. Acceptable foods for the first 24 hours following surgery include but are not limited to:    soup  broth  toast   crackers   applesauce  bananas   mashed potatoes,  soft or scrambled eggs  oatmeal  jello    It is important to eat when taking your pain medication. This will help to prevent nausea. If possible, please try to time your meals with your medications. It is very important to stay hydrated following surgery. Sip fluids frequently while awake. Avoid acidic drinks such as citrus juices and soda for 24 hours. Carbonated beverages may cause bloating and gas. Acceptable fluids include:    water (flavor packets may add variety)  coffee or tea (in moderation)  Gatorade  Jerald-Aid  apple juice  cranberry juice    You are encouraged to cough and deep breathe every hour when awake. This will help to prevent respiratory complications following anesthesia. You may want to hug a pillow when coughing and sneezing to add additional support to the surgical area and to decrease discomfort if you had abdominal or chest surgery.     If you are discharged home with support stockings, you may remove them after 24 hours. Support stockings are used to help prevent blood clots in the legs following surgery. TO PREVENT AN INFECTION      [address] YOUR HANDS    To prevent infection, good handwashing is the most important thing you or your caregiver can do. Wash your hands with soap and water or use the hand  we gave you before you touch any wounds. SHOWER    Use the antibacterial soap we gave you when you take a shower. Shower with this soap until your wounds are healed. To reach all areas of your body, you may need someone to help you. Dont forget to clean your belly button with every shower. USE CLEAN SHEETS    Use freshly cleaned sheets on your bed after surgery. To keep the surgery site clean, do not allow pets to sleep with you while your wound is still healing. STOP SMOKING    Stop smoking, or at least cut back on smoking    Smoking slows your healing. CONTROL YOUR BLOOD SUGAR    High blood sugars slow wound healing. If you are diabetic, control your blood sugar levels before and after your surgery. Please take time to review all of your Home Care Instructions and Medication Information sheets provided in your discharge packet. If you have any questions, please contact your surgeon's office. Thank you. The discharge information has been reviewed with the patient and instruction recipient. The patient and instruction recipient verbalized understanding. Discharge medications reviewed with the patient and instruction recipient and appropriate educational materials and side effects teaching were provided. Please provide this summary of care documentation to your next provider.

## 2023-02-08 NOTE — ANESTHESIA POSTPROCEDURE EVALUATION
Procedure(s):  EXCISION RIGHT SCALP LIPOMA. MAC    Anesthesia Post Evaluation      Multimodal analgesia: multimodal analgesia used between 6 hours prior to anesthesia start to PACU discharge  Patient location during evaluation: PACU  Patient participation: complete - patient participated  Level of consciousness: sleepy but conscious and responsive to verbal stimuli  Pain score: 2  Pain management: adequate  Airway patency: patent  Anesthetic complications: no  Cardiovascular status: acceptable  Respiratory status: acceptable  Hydration status: acceptable  Comments: +Post-Anesthesia Evaluation and Assessment    Patient: Moraima Julien MRN: [id number]  SSN: [id number]   YOB: 1945  Age: 65 y.o. Sex: male      Cardiovascular Function/Vital Signs    BP (!) 120/58   Pulse 74   Temp 36.8 °C (98.2 °F)   Resp 18   Ht 6' (1.829 m)   Wt 93.6 kg (206 lb 5.6 oz)   SpO2 99%   BMI 27.99 kg/m²     Patient is status post Procedure(s):  EXCISION RIGHT SCALP LIPOMA. Nausea/Vomiting: Controlled. Postoperative hydration reviewed and adequate. Pain:  Pain Scale 1: Numeric (0 - 10) (02/08/23 1049)  Pain Intensity 1: 0 (02/08/23 1049)   Managed. Neurological Status:   Neuro (WDL): Within Defined Limits (02/08/23 1049)   At baseline. Mental Status and Level of Consciousness: Arousable. Pulmonary Status:   O2 Device: Nasal cannula (02/08/23 1351)   Adequate oxygenation and airway patent. Complications related to anesthesia: None    Post-anesthesia assessment completed. No concerns.     Signed By: Maximiliano Rodriguez MD    2/8/2023  Post anesthesia nausea and vomiting:  controlled  Final Post Anesthesia Temperature Assessment:  Normothermia (36.0-37.5 degrees C)      INITIAL Post-op Vital signs:   Vitals Value Taken Time   /57 02/08/23 1355   Temp 36.8 °C (98.2 °F) 02/08/23 1351   Pulse 73 02/08/23 1357   Resp 16 02/08/23 1357   SpO2 97 % 02/08/23 1357   Vitals shown include unvalidated device data.

## 2023-02-08 NOTE — PERIOP NOTES
[address] from Operating Room to PACU    Report received from [address] [place] [address] and Saul Espinosa CRNA regarding [facility]. Surgeon(s):  Temple Lesches, MD  And Procedure(s) (LRB):  EXCISION RIGHT SCALP LIPOMA (Right)  confirmed   with allergies and dressings discussed. Anesthesia type, drugs, patient history, complications, estimated blood loss, vital signs, intake and output, and last pain medication, lines, and temperature were reviewed. 0780 Report given to [facility]. Pt and all belongings transported to phase II.

## 2023-02-08 NOTE — PERIOP NOTES
Kathy [employer] Hemostatic Paritcles 3gram Reference number: [id number] Lot Number: [id number] Expiration Date: 2027/08/28

## 2023-02-20 NOTE — OP NOTES
[address]  OPERATIVE REPORT    Name:  Marck Dobson  MR#:  [id number]  :  1945  ACCOUNT #:  [de-identified]  DATE OF SERVICE:  2023    PREOPERATIVE DIAGNOSIS:  Painful right frontal scalp mass. POSTOPERATIVE DIAGNOSIS:  Right frontal scalp subfascial lipoma. PROCEDURE PERFORMED:  Excision right frontal scalp, subfascial lipoma measuring 8 cm in diameter. SURGEON:  Yudy Martinez MD    ASSISTANT:  Garrick Brooks. ANESTHESIA:  Local with IV sedation. COMPLICATIONS:  None. SPECIMENS REMOVED:  Right scalp lipoma. IMPLANTS:  None. ESTIMATED BLOOD LOSS:  Less than 20 mL. INDICATIONS:  This 76-year-old male presented with a painful, and enlarging mass of his right frontal scalp. Lipoma was suspected and excision was indicated. PROCEDURE:  After informed consent was obtained and under IV sedation, the right frontal scalp was infiltrated with 1% lidocaine with epinephrine and then prepped and draped. Under wound magnification, a transverse incision was made directly over the mass. Residual frontalis fibres and galea aponeurotica were divided. A soft, fatty, lobular mass was identified, adjacent to pericranium. It was dissected free of adherent tissue and sent for Pathology. Total diameter was 8 cm. Muscle and galea were reapproximated with 4-0 Vicryl. The skin was closed in layers with 4-0 Vicryl and 4-0 Prolene. Dry dressing were applied. He tolerated the procedure well and was transferred to the recovery room in good condition.       Radha Barrow MD      [id number]  D:  2023 14:15  T:  2023 21:39  JOB #:  [id number]  CC:  MD Yudy Alvarenga MD Oralee Solan, MD

## 2023-09-08 RX ORDER — ALLOPURINOL 300 MG/1
TABLET ORAL
Qty: 180 TABLET | Refills: 3 | Status: SHIPPED | OUTPATIENT
Start: 2023-09-08

## 2023-10-24 ENCOUNTER — OFFICE VISIT (OUTPATIENT)
Age: 78
End: 2023-10-24
Payer: MEDICARE

## 2023-10-24 VITALS
WEIGHT: 208 LBS | BODY MASS INDEX: 28.21 KG/M2 | TEMPERATURE: 97.7 F | DIASTOLIC BLOOD PRESSURE: 61 MMHG | OXYGEN SATURATION: 93 % | HEART RATE: 77 BPM | RESPIRATION RATE: 16 BRPM | SYSTOLIC BLOOD PRESSURE: 125 MMHG

## 2023-10-24 DIAGNOSIS — M10.9 GOUT OF SHOULDER REGION, UNSPECIFIED CAUSE, UNSPECIFIED CHRONICITY, UNSPECIFIED LATERALITY: Primary | ICD-10-CM

## 2023-10-24 DIAGNOSIS — M10.9 GOUT, UNSPECIFIED CAUSE, UNSPECIFIED CHRONICITY, UNSPECIFIED SITE: ICD-10-CM

## 2023-10-24 PROCEDURE — G8419 CALC BMI OUT NRM PARAM NOF/U: HCPCS | Performed by: PEDIATRICS

## 2023-10-24 PROCEDURE — 1123F ACP DISCUSS/DSCN MKR DOCD: CPT | Performed by: PEDIATRICS

## 2023-10-24 PROCEDURE — 99214 OFFICE O/P EST MOD 30 MIN: CPT | Performed by: PEDIATRICS

## 2023-10-24 PROCEDURE — G8484 FLU IMMUNIZE NO ADMIN: HCPCS | Performed by: PEDIATRICS

## 2023-10-24 PROCEDURE — 1036F TOBACCO NON-USER: CPT | Performed by: PEDIATRICS

## 2023-10-24 PROCEDURE — G8427 DOCREV CUR MEDS BY ELIG CLIN: HCPCS | Performed by: PEDIATRICS

## 2023-10-24 RX ORDER — TAMSULOSIN HYDROCHLORIDE 0.4 MG/1
CAPSULE ORAL
COMMUNITY
Start: 2023-08-29

## 2023-10-24 RX ORDER — BACLOFEN 20 MG
TABLET ORAL
COMMUNITY
Start: 2023-07-31

## 2023-10-24 RX ORDER — ALLOPURINOL 300 MG/1
600 TABLET ORAL DAILY
Qty: 180 TABLET | Refills: 5 | Status: SHIPPED | OUTPATIENT
Start: 2023-10-24

## 2023-10-24 ASSESSMENT — PATIENT HEALTH QUESTIONNAIRE - PHQ9
SUM OF ALL RESPONSES TO PHQ QUESTIONS 1-9: 0
2. FEELING DOWN, DEPRESSED OR HOPELESS: 0
SUM OF ALL RESPONSES TO PHQ QUESTIONS 1-9: 0
SUM OF ALL RESPONSES TO PHQ9 QUESTIONS 1 & 2: 0
1. LITTLE INTEREST OR PLEASURE IN DOING THINGS: 0

## 2023-10-24 NOTE — PROGRESS NOTES
Chief Complaint   Patient presents with    Joint Pain     1. Have you been to the ER, urgent care clinic since your last visit? Hospitalized since your last visit? No    2. Have you seen or consulted any other health care providers outside of the [address] [place] [address] since your last visit? Include any pap smears or colon screening.  No

## 2024-10-14 ENCOUNTER — OFFICE VISIT (OUTPATIENT)
Age: 79
End: 2024-10-14
Payer: MEDICARE

## 2024-10-14 VITALS
TEMPERATURE: 97.9 F | WEIGHT: 200 LBS | OXYGEN SATURATION: 92 % | BODY MASS INDEX: 27.12 KG/M2 | DIASTOLIC BLOOD PRESSURE: 56 MMHG | HEART RATE: 80 BPM | RESPIRATION RATE: 16 BRPM | SYSTOLIC BLOOD PRESSURE: 132 MMHG

## 2024-10-14 DIAGNOSIS — M10.9 GOUT, UNSPECIFIED CAUSE, UNSPECIFIED CHRONICITY, UNSPECIFIED SITE: ICD-10-CM

## 2024-10-14 DIAGNOSIS — M10.9 GOUT OF SHOULDER REGION, UNSPECIFIED CAUSE, UNSPECIFIED CHRONICITY, UNSPECIFIED LATERALITY: Primary | ICD-10-CM

## 2024-10-14 PROCEDURE — 99214 OFFICE O/P EST MOD 30 MIN: CPT | Performed by: PEDIATRICS

## 2024-10-14 PROCEDURE — G8427 DOCREV CUR MEDS BY ELIG CLIN: HCPCS | Performed by: PEDIATRICS

## 2024-10-14 PROCEDURE — G8419 CALC BMI OUT NRM PARAM NOF/U: HCPCS | Performed by: PEDIATRICS

## 2024-10-14 PROCEDURE — G8484 FLU IMMUNIZE NO ADMIN: HCPCS | Performed by: PEDIATRICS

## 2024-10-14 PROCEDURE — 1036F TOBACCO NON-USER: CPT | Performed by: PEDIATRICS

## 2024-10-14 PROCEDURE — 1123F ACP DISCUSS/DSCN MKR DOCD: CPT | Performed by: PEDIATRICS

## 2024-10-14 RX ORDER — SPIRONOLACTONE 25 MG/1
1 TABLET ORAL DAILY
COMMUNITY

## 2024-10-14 RX ORDER — ALLOPURINOL 300 MG/1
600 TABLET ORAL DAILY
Qty: 180 TABLET | Refills: 5 | Status: SHIPPED | OUTPATIENT
Start: 2024-10-14

## 2024-10-14 ASSESSMENT — PATIENT HEALTH QUESTIONNAIRE - PHQ9
1. LITTLE INTEREST OR PLEASURE IN DOING THINGS: NOT AT ALL
SUM OF ALL RESPONSES TO PHQ9 QUESTIONS 1 & 2: 0
2. FEELING DOWN, DEPRESSED OR HOPELESS: NOT AT ALL
SUM OF ALL RESPONSES TO PHQ QUESTIONS 1-9: 0

## 2024-10-14 NOTE — PROGRESS NOTES
Chief Complaint   Patient presents with    Joint Pain     1. Have you been to the ER, urgent care clinic since your last visit?  Hospitalized since your last visit?No    2. Have you seen or consulted any other health care providers outside of the [facility] since your last visit?  Include any pap smears or colon screening. No

## 2024-10-14 NOTE — PROGRESS NOTES
RHEUMATOLOGY PROBLEM LIST AND CHIEF COMPLAINT  1. 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 CPPD  COVID-19 vaccination (+, Moderna)     INTERVAL HISTORY  This is a 79 y.o.  male.  Today, the patient complains of no generalized pain.  Location: none  Severity: 0 on a scale of 0-10  Timing: none  Duration:  12 months  Context/Associated signs and symptoms: The patient continues on allopurinol 600 mg daily. He has been doing well overall and denies any interval gout flare.       RHEUMATOLOGY REVIEW OF SYSTEMS   Positives as per history  Negatives as follows:  CONSTITUTlONAL:    Denies unexplained persistent fevers, weight change, chronic fatigue  HEAD/EYES:              Denies eye redness, blurry vision or sudden loss of vision, dry eyes, HA, temporal artery pain  ENT:                            Denies oral/nasal ulcers, recurrent sinus infections, dry mouth  RESPIRATORY:         No pleuritic pain, history of pleural effusions, hemoptysis, exertional dyspnea  CARDIOVASCULAR:             Denies chest pain, history of pericardial effusions  GASTRO:                    Denies heartburn, esophageal dysmotility, abdominal pain, nausea, vomiting, diarrhea, blood in the stool  HEMATOLOGIC:        No easy bruising, purpura, swollen lymph nodes  SKIN:                           Denies alopecia, ulcers, nodules, sun sensitivity, unexplained persistent rash   VASCULAR:                Denies edema, cyanosis, raynaud phenomenon  NEUROLOGIC:           Denies specific muscle weakness, paresthesias   PSYCHIATRIC:           No sleep disturbance / snoring, depression, anxiety  MSK:                           No morning stiffness >1 hour, SI joint pain, persistent joint swelling, persistent joint pain     PAST MEDICAL HISTORY  Reviewed with patient, significant changes in medical history - no      PHYSICAL EXAM  Blood pressure (!) 132/56, pulse 80, temperature 97.9 °F (36.6 °C), temperature source Oral, resp. rate 16, weight 90.7

## 2025-03-27 ENCOUNTER — HOSPITAL ENCOUNTER (EMERGENCY)
Facility: HOSPITAL | Age: 80
Discharge: HOME | End: 2025-03-27
Attending: EMERGENCY MEDICINE
Payer: MEDICARE

## 2025-03-27 ENCOUNTER — APPOINTMENT (OUTPATIENT)
Facility: HOSPITAL | Age: 80
End: 2025-03-27
Payer: MEDICARE

## 2025-03-27 VITALS
SYSTOLIC BLOOD PRESSURE: 136 MMHG | DIASTOLIC BLOOD PRESSURE: 52 MMHG | TEMPERATURE: 98.8 F | WEIGHT: 198.19 LBS | BODY MASS INDEX: 27.75 KG/M2 | HEIGHT: 71 IN | OXYGEN SATURATION: 94 % | RESPIRATION RATE: 26 BRPM | HEART RATE: 80 BPM

## 2025-03-27 DIAGNOSIS — U07.1 COVID-19: Primary | ICD-10-CM

## 2025-03-27 LAB
ALBUMIN SERPL-MCNC: 3.1 G/DL (ref 3.5–5)
ALBUMIN/GLOB SERPL: 0.9 (ref 1.1–2.2)
ALP SERPL-CCNC: 88 U/L (ref 45–117)
ALT SERPL-CCNC: 23 U/L (ref 12–78)
ANION GAP SERPL CALC-SCNC: 2 MMOL/L (ref 2–12)
AST SERPL-CCNC: 12 U/L (ref 15–37)
BASOPHILS # BLD: 0.01 K/UL (ref 0–0.1)
BASOPHILS NFR BLD: 0.2 % (ref 0–1)
BILIRUB SERPL-MCNC: 0.4 MG/DL (ref 0.2–1)
BUN SERPL-MCNC: 27 MG/DL (ref 6–20)
BUN/CREAT SERPL: 15 (ref 12–20)
CALCIUM SERPL-MCNC: 8.6 MG/DL (ref 8.5–10.1)
CHLORIDE SERPL-SCNC: 110 MMOL/L (ref 97–108)
CO2 SERPL-SCNC: 28 MMOL/L (ref 21–32)
CREAT SERPL-MCNC: 1.79 MG/DL (ref 0.7–1.3)
DIFFERENTIAL METHOD BLD: ABNORMAL
EOSINOPHIL # BLD: 0.06 K/UL (ref 0–0.4)
EOSINOPHIL NFR BLD: 1.1 % (ref 0–7)
ERYTHROCYTE [DISTWIDTH] IN BLOOD BY AUTOMATED COUNT: 14.6 % (ref 11.5–14.5)
FLUAV RNA SPEC QL NAA+PROBE: NOT DETECTED
FLUBV RNA SPEC QL NAA+PROBE: NOT DETECTED
GLOBULIN SER CALC-MCNC: 3.4 G/DL (ref 2–4)
GLUCOSE SERPL-MCNC: 151 MG/DL (ref 65–100)
HCT VFR BLD AUTO: 33.5 % (ref 36.6–50.3)
HGB BLD-MCNC: 10.5 G/DL (ref 12.1–17)
IMM GRANULOCYTES # BLD AUTO: 0.02 K/UL (ref 0–0.04)
IMM GRANULOCYTES NFR BLD AUTO: 0.4 % (ref 0–0.5)
LYMPHOCYTES # BLD: 0.72 K/UL (ref 0.8–3.5)
LYMPHOCYTES NFR BLD: 13.1 % (ref 12–49)
MAGNESIUM SERPL-MCNC: 1.7 MG/DL (ref 1.6–2.4)
MCH RBC QN AUTO: 32.1 PG (ref 26–34)
MCHC RBC AUTO-ENTMCNC: 31.3 G/DL (ref 30–36.5)
MCV RBC AUTO: 102.4 FL (ref 80–99)
MONOCYTES # BLD: 0.71 K/UL (ref 0–1)
MONOCYTES NFR BLD: 12.9 % (ref 5–13)
NEUTS SEG # BLD: 3.98 K/UL (ref 1.8–8)
NEUTS SEG NFR BLD: 72.3 % (ref 32–75)
NRBC # BLD: 0 K/UL (ref 0–0.01)
NRBC BLD-RTO: 0 PER 100 WBC
PLATELET # BLD AUTO: 149 K/UL (ref 150–400)
PMV BLD AUTO: 10.9 FL (ref 8.9–12.9)
POTASSIUM SERPL-SCNC: 4.1 MMOL/L (ref 3.5–5.1)
PROT SERPL-MCNC: 6.5 G/DL (ref 6.4–8.2)
RBC # BLD AUTO: 3.27 M/UL (ref 4.1–5.7)
RBC MORPH BLD: ABNORMAL
SARS-COV-2 RNA RESP QL NAA+PROBE: DETECTED
SODIUM SERPL-SCNC: 140 MMOL/L (ref 136–145)
SOURCE: ABNORMAL
TROPONIN I SERPL HS-MCNC: 44 NG/L (ref 0–76)
WBC # BLD AUTO: 5.5 K/UL (ref 4.1–11.1)

## 2025-03-27 PROCEDURE — 80053 COMPREHEN METABOLIC PANEL: CPT

## 2025-03-27 PROCEDURE — 93005 ELECTROCARDIOGRAM TRACING: CPT | Performed by: EMERGENCY MEDICINE

## 2025-03-27 PROCEDURE — 87636 SARSCOV2 & INF A&B AMP PRB: CPT

## 2025-03-27 PROCEDURE — 71045 X-RAY EXAM CHEST 1 VIEW: CPT

## 2025-03-27 PROCEDURE — 36415 COLL VENOUS BLD VENIPUNCTURE: CPT

## 2025-03-27 PROCEDURE — 84484 ASSAY OF TROPONIN QUANT: CPT

## 2025-03-27 PROCEDURE — 85025 COMPLETE CBC W/AUTO DIFF WBC: CPT

## 2025-03-27 PROCEDURE — 83735 ASSAY OF MAGNESIUM: CPT

## 2025-03-27 PROCEDURE — 99285 EMERGENCY DEPT VISIT HI MDM: CPT

## 2025-03-27 PROCEDURE — 6370000000 HC RX 637 (ALT 250 FOR IP): Performed by: EMERGENCY MEDICINE

## 2025-03-27 RX ORDER — ACETAMINOPHEN 500 MG
1000 TABLET ORAL
Status: COMPLETED | OUTPATIENT
Start: 2025-03-27 | End: 2025-03-27

## 2025-03-27 RX ADMIN — ACETAMINOPHEN 1000 MG: 500 TABLET, FILM COATED ORAL at 21:29

## 2025-03-27 ASSESSMENT — ENCOUNTER SYMPTOMS
SINUS PRESSURE: 1
COUGH: 1
WHEEZING: 0

## 2025-03-27 ASSESSMENT — PAIN SCALES - GENERAL: PAINLEVEL_OUTOF10: 0

## 2025-03-27 NOTE — ED PROVIDER NOTES
EMERGENCY DEPARTMENT HISTORY AND PHYSICAL EXAM    Date: 3/27/2025  Patient Name: Devante Alcala  Patient Age and Sex: 79 y.o. male  MRN:  [id number]  CSN:  [id number]    History of Present Illness     Chief Complaint   Patient presents with    Shortness of Breath     Cold symptoms since Tuesday - pt states he received PNA vaccine that day and hasn't felt right since       History Provided By: Patient and Patient's Wife    Ability to gather history was limited by:     HPI: Devante Alcala, 79 y.o. male   Complains of generalized weakness and fatigue, sinus congestion, mild cough.  His symptoms seem to be mild and present for 2 to 3 days.  He reports that he received a pneumonia vaccine 48 hours ago and feels as though the symptoms were triggered by the vaccine.  No chest pain      Tobacco Use      Smoking status: Former        Packs/day: 0.00        Types: Cigarettes        Quit date: 3/12/2005        Years since quittin.0        Passive exposure: Past      Smokeless tobacco: Never     Past History   The patient's medical, surgical, and social history were reviewed by me today.    Current Medications:  No current facility-administered medications on file prior to encounter.     Current Outpatient Medications on File Prior to Encounter   Medication Sig Dispense Refill    spironolactone (ALDACTONE) 25 MG tablet Take 1 tablet by mouth daily      allopurinol (ZYLOPRIM) 300 MG tablet Take 2 tablets by mouth daily 180 tablet 5    magnesium Oxide 500 MG TABS       tamsulosin (FLOMAX) 0.4 MG capsule       acetaminophen (TYLENOL) 500 MG tablet Take by mouth every 6 hours as needed      albuterol sulfate HFA (VENTOLIN HFA) 108 (90 Base) MCG/ACT inhaler Inhale 2 puffs into the lungs every 6 hours as needed      amLODIPine (NORVASC) 5 MG tablet TAKE 1 TABLET BY MOUTH EVERY MORNING      aspirin 81 MG chewable tablet Take 1 tablet by mouth daily      atorvastatin (LIPITOR) 10 MG tablet TAKE 1 TABLET BY MOUTH ONCE A DAY    infiltrate, no pneumonia      Differential Diagnoses ruled out: Pneumonia, sepsis    I will be the attending of record for this patient.          Sourav Nino MD      Additional documentation if relevant for this encounter     ED Course Updates      Diagnosis and Disposition     Disposition: Decision To Discharge 03/27/2025 08:49:04 PM     Final Diagnosis:   1. COVID-19           Medication List        ASK your doctor about these medications      acetaminophen 500 MG tablet  Commonly known as: TYLENOL     allopurinol 300 MG tablet  Commonly known as: ZYLOPRIM  Take 2 tablets by mouth daily     amLODIPine 5 MG tablet  Commonly known as: NORVASC     aspirin 81 MG chewable tablet     atorvastatin 10 MG tablet  Commonly known as: LIPITOR     carvedilol 25 MG tablet  Commonly known as: COREG     cyanocobalamin 1000 MCG tablet     ferrous sulfate 325 (65 Fe) MG tablet  Commonly known as: IRON 325     finasteride 5 MG tablet  Commonly known as: PROSCAR     furosemide 20 MG tablet  Commonly known as: LASIX     gabapentin 300 MG capsule  Commonly known as: NEURONTIN     glipiZIDE 5 MG extended release tablet  Commonly known as: GLUCOTROL XL     lisinopril 20 MG tablet  Commonly known as: PRINIVIL;ZESTRIL     Magnesium Oxide 250 MG Tabs tablet  Commonly known as: MAGNESIUM-OXIDE     magnesium Oxide 500 MG Tabs     pantoprazole 40 MG tablet  Commonly known as: PROTONIX     spironolactone 25 MG tablet  Commonly known as: ALDACTONE     Symbicort 160-4.5 MCG/ACT Aero  Generic drug: budesonide-formoterol     tamsulosin 0.4 MG capsule  Commonly known as: FLOMAX     Ventolin  (90 Base) MCG/ACT inhaler  Generic drug: albuterol sulfate HFA               Follow up:  [facility] Emergency Department  [address] [facility] [place] [address]  [phone]        Jamaal Mistry MD  [facility]   [facility] [address] [phone]             Disclaimers   I was the first provider for this patient on this

## 2025-03-28 LAB
EKG ATRIAL RATE: 92 BPM
EKG DIAGNOSIS: NORMAL
EKG P AXIS: 68 DEGREES
EKG P-R INTERVAL: 174 MS
EKG Q-T INTERVAL: 356 MS
EKG QRS DURATION: 76 MS
EKG QTC CALCULATION (BAZETT): 440 MS
EKG R AXIS: 14 DEGREES
EKG T AXIS: 72 DEGREES
EKG VENTRICULAR RATE: 92 BPM

## 2025-03-28 NOTE — DISCHARGE INSTRUCTIONS
It was a pleasure taking care of you at [facility] today.      We know that when you come to [facility], you are entrusting us with your health, comfort, and safety.  Our physicians and nurses honor that trust, and we appreciate the opportunity to care for you and your loved ones.  We also value your feedback, and we would like to hear from you.    When you receive a  >>> survey <<< about your Emergency Department experience today, please fill it out. We review every single response from our patients. Thank you!    Sincerely,  Dr. Sourav Nino MD

## 2025-03-28 NOTE — ED NOTES
This RN has reviewed discharge instructions with the patient at this time. The Patient and Family member verbalized understanding and denies any further questions. The patient has been made aware of prescription(s) called into pharmacy for . Patient ambulatory with cane out to waiting room at this time.

## (undated) DEVICE — SOLUTION IRRIG 1000ML 0.9% SOD CHL USP POUR PLAS BTL

## (undated) DEVICE — BLOCK BITE ENDOSCP AD 21 MM W/ DIL BLU LF DISP

## (undated) DEVICE — BLADE ASSEMB CLP HAIR FINE --

## (undated) DEVICE — CONTAINER SPEC 20 ML LID NEUT BUFF FORMALIN 10 % POLYPR STS

## (undated) DEVICE — SOL SKIN PREP POV IOD 2OZ --

## (undated) DEVICE — Device

## (undated) DEVICE — MICRODISSECTION NEEDLE STRAIGHT SLEEVE: Brand: COLORADO

## (undated) DEVICE — SOLIDIFIER MEDC 1200ML -- CONVERT TO 356117

## (undated) DEVICE — ENT-MRMCASU: Brand: MEDLINE INDUSTRIES, INC.

## (undated) DEVICE — REM POLYHESIVE ADULT PATIENT RETURN ELECTRODE: Brand: VALLEYLAB

## (undated) DEVICE — Device: Brand: JELCO

## (undated) DEVICE — SYRINGE,EAR/ULCER, 2 OZ, STERILE: Brand: MEDLINE

## (undated) DEVICE — NEEDLE HYPO 25GA L1.5IN BLU POLYPR HUB S STL REG BVL STR

## (undated) DEVICE — BASIN EMSIS 16OZ GRAPHITE PLAS KID SHP MOLD GRAD FOR ORAL

## (undated) DEVICE — MEDI-VAC YANK SUCT HNDL W/TPRD BULBOUS TIP: Brand: CARDINAL HEALTH

## (undated) DEVICE — SET ADMIN 16ML TBNG L100IN 2 Y INJ SITE IV PIGGY BK DISP

## (undated) DEVICE — Z DISCONTINUED PER MEDLINE LINE GAS SAMPLING O2/CO2 LNG AD 13 FT NSL W/ TBNG FILTERLINE

## (undated) DEVICE — SUTURE PROL SZ 5-0 L18IN NONABSORBABLE BLU L19MM PS-2 3/8 8686G

## (undated) DEVICE — 1200 GUARD II KIT W/5MM TUBE W/O VAC TUBE: Brand: GUARDIAN

## (undated) DEVICE — SUTURE PROL SZ 4-0 L18IN NONABSORBABLE BLU L19MM PS-2 3/8 8682G

## (undated) DEVICE — NEEDLE HYPO 18GA L1.5IN PNK S STL HUB POLYPR SHLD REG BVL

## (undated) DEVICE — BAG SPEC BIOHZRD 10 X 10 IN --

## (undated) DEVICE — ROCKER SWITCH PENCIL BLADE ELECTRODE, HOLSTER: Brand: EDGE

## (undated) DEVICE — KENDALL RADIOLUCENT FOAM MONITORING ELECTRODE RECTANGULAR SHAPE: Brand: KENDALL

## (undated) DEVICE — GLOVE ORANGE PI 7   MSG9070

## (undated) DEVICE — SPONGE GZ W4XL4IN COT RADPQ HIGHLY ABSRB

## (undated) DEVICE — CATH IV AUTOGRD BC BLU 22GA 25 -- INSYTE

## (undated) DEVICE — TOWEL 4 PLY TISS 19X30 SUE WHT

## (undated) DEVICE — AGENT HEMSTAT 3GM PURIFIED PLNT STARCH PWD ABSRB ARISTA AH

## (undated) DEVICE — FORCEPS BX L160CM DIA8MM GRSP DISECT CUP TIP NONLOCKING ROT

## (undated) DEVICE — SYR 10ML LUER LOK 1/5ML GRAD --